# Patient Record
Sex: FEMALE | Race: BLACK OR AFRICAN AMERICAN | NOT HISPANIC OR LATINO | Employment: OTHER | URBAN - METROPOLITAN AREA
[De-identification: names, ages, dates, MRNs, and addresses within clinical notes are randomized per-mention and may not be internally consistent; named-entity substitution may affect disease eponyms.]

---

## 2015-11-30 LAB — CRC RECOMMENDATION EXT: NORMAL

## 2017-01-20 ENCOUNTER — HISTORICAL (OUTPATIENT)
Dept: ADMINISTRATIVE | Facility: HOSPITAL | Age: 67
End: 2017-01-20

## 2017-01-30 ENCOUNTER — HISTORICAL (OUTPATIENT)
Dept: ADMINISTRATIVE | Facility: HOSPITAL | Age: 67
End: 2017-01-30

## 2017-02-02 ENCOUNTER — HISTORICAL (OUTPATIENT)
Dept: SURGERY | Facility: HOSPITAL | Age: 67
End: 2017-02-02

## 2017-02-27 ENCOUNTER — HISTORICAL (OUTPATIENT)
Dept: INTERNAL MEDICINE | Facility: CLINIC | Age: 67
End: 2017-02-27

## 2017-05-22 ENCOUNTER — HISTORICAL (OUTPATIENT)
Dept: WOUND CARE | Facility: HOSPITAL | Age: 67
End: 2017-05-22

## 2018-02-28 ENCOUNTER — HISTORICAL (OUTPATIENT)
Dept: INTERNAL MEDICINE | Facility: CLINIC | Age: 68
End: 2018-02-28

## 2018-02-28 LAB
ABS NEUT (OLG): 5.41 X10(3)/MCL (ref 2.1–9.2)
ALBUMIN SERPL-MCNC: 3.8 GM/DL (ref 3.4–5)
ALBUMIN/GLOB SERPL: 1 RATIO (ref 1–2)
ALP SERPL-CCNC: 79 UNIT/L (ref 45–117)
ALT SERPL-CCNC: 21 UNIT/L (ref 12–78)
AST SERPL-CCNC: 14 UNIT/L (ref 15–37)
BASOPHILS # BLD AUTO: 0.02 X10(3)/MCL
BASOPHILS NFR BLD AUTO: 0 %
BILIRUB SERPL-MCNC: 0.3 MG/DL (ref 0.2–1)
BILIRUBIN DIRECT+TOT PNL SERPL-MCNC: 0.1 MG/DL
BILIRUBIN DIRECT+TOT PNL SERPL-MCNC: 0.2 MG/DL
BUN SERPL-MCNC: 23 MG/DL (ref 7–18)
CALCIUM SERPL-MCNC: 8.4 MG/DL (ref 8.5–10.1)
CHLORIDE SERPL-SCNC: 107 MMOL/L (ref 98–107)
CHOLEST SERPL-MCNC: 127 MG/DL
CHOLEST/HDLC SERPL: 2.9 {RATIO} (ref 0–4.4)
CO2 SERPL-SCNC: 32 MMOL/L (ref 21–32)
COLOR STL: NORMAL
CONSISTENCY STL: NORMAL
CREAT SERPL-MCNC: 1.1 MG/DL (ref 0.6–1.3)
EOSINOPHIL # BLD AUTO: 0.34 X10(3)/MCL
EOSINOPHIL NFR BLD AUTO: 4 %
ERYTHROCYTE [DISTWIDTH] IN BLOOD BY AUTOMATED COUNT: 13.2 % (ref 11.5–14.5)
EST. AVERAGE GLUCOSE BLD GHB EST-MCNC: 157 MG/DL
GLOBULIN SER-MCNC: 3.9 GM/ML (ref 2.3–3.5)
GLUCOSE SERPL-MCNC: 112 MG/DL (ref 74–106)
HBA1C MFR BLD: 7.1 % (ref 4.2–6.3)
HCT VFR BLD AUTO: 39.2 % (ref 35–46)
HDLC SERPL-MCNC: 44 MG/DL
HEMOCCULT SP1 STL QL: NEGATIVE
HEMOCCULT SP2 STL QL: NEGATIVE
HGB BLD-MCNC: 12.5 GM/DL (ref 12–16)
IMM GRANULOCYTES # BLD AUTO: 0.02 10*3/UL
IMM GRANULOCYTES NFR BLD AUTO: 0 %
LDLC SERPL CALC-MCNC: 50 MG/DL (ref 0–130)
LYMPHOCYTES # BLD AUTO: 2.62 X10(3)/MCL
LYMPHOCYTES NFR BLD AUTO: 29 % (ref 13–40)
MCH RBC QN AUTO: 29.6 PG (ref 26–34)
MCHC RBC AUTO-ENTMCNC: 31.9 GM/DL (ref 31–37)
MCV RBC AUTO: 92.9 FL (ref 80–100)
MONOCYTES # BLD AUTO: 0.51 X10(3)/MCL
MONOCYTES NFR BLD AUTO: 6 % (ref 4–12)
NEUTROPHILS # BLD AUTO: 5.41 X10(3)/MCL
NEUTROPHILS NFR BLD AUTO: 61 X10(3)/MCL
PLATELET # BLD AUTO: 271 X10(3)/MCL (ref 130–400)
PMV BLD AUTO: 9.3 FL (ref 7.4–10.4)
POTASSIUM SERPL-SCNC: 3.6 MMOL/L (ref 3.5–5.1)
PROT SERPL-MCNC: 7.7 GM/DL (ref 6.4–8.2)
RBC # BLD AUTO: 4.22 X10(6)/MCL (ref 4–5.2)
SODIUM SERPL-SCNC: 146 MMOL/L (ref 136–145)
T4 FREE SERPL-MCNC: 0.86 NG/DL (ref 0.76–1.46)
TRIGL SERPL-MCNC: 166 MG/DL
TSH SERPL-ACNC: 4.2 MIU/L (ref 0.36–3.74)
VLDLC SERPL CALC-MCNC: 33 MG/DL
WBC # SPEC AUTO: 8.9 X10(3)/MCL (ref 4.5–11)

## 2018-08-08 ENCOUNTER — HISTORICAL (OUTPATIENT)
Dept: RADIOLOGY | Facility: HOSPITAL | Age: 68
End: 2018-08-08

## 2018-08-28 ENCOUNTER — HISTORICAL (OUTPATIENT)
Dept: INTERNAL MEDICINE | Facility: CLINIC | Age: 68
End: 2018-08-28

## 2018-08-28 LAB
ABS NEUT (OLG): 4.37 X10(3)/MCL (ref 2.1–9.2)
ALBUMIN SERPL-MCNC: 4 GM/DL (ref 3.4–5)
ALBUMIN/GLOB SERPL: 1 RATIO (ref 1–2)
ALP SERPL-CCNC: 90 UNIT/L (ref 45–117)
ALT SERPL-CCNC: 19 UNIT/L (ref 12–78)
AST SERPL-CCNC: 14 UNIT/L (ref 15–37)
BASOPHILS # BLD AUTO: 0.03 X10(3)/MCL
BASOPHILS NFR BLD AUTO: 0 %
BILIRUB SERPL-MCNC: 0.4 MG/DL (ref 0.2–1)
BILIRUBIN DIRECT+TOT PNL SERPL-MCNC: 0.1 MG/DL
BILIRUBIN DIRECT+TOT PNL SERPL-MCNC: 0.3 MG/DL
BUN SERPL-MCNC: 25 MG/DL (ref 7–18)
CALCIUM SERPL-MCNC: 8.5 MG/DL (ref 8.5–10.1)
CHLORIDE SERPL-SCNC: 108 MMOL/L (ref 98–107)
CHOLEST SERPL-MCNC: 131 MG/DL
CHOLEST/HDLC SERPL: 2.5 {RATIO} (ref 0–4.4)
CO2 SERPL-SCNC: 33 MMOL/L (ref 21–32)
CREAT SERPL-MCNC: 1.2 MG/DL (ref 0.6–1.3)
EOSINOPHIL # BLD AUTO: 0.32 X10(3)/MCL
EOSINOPHIL NFR BLD AUTO: 4 %
ERYTHROCYTE [DISTWIDTH] IN BLOOD BY AUTOMATED COUNT: 13.5 % (ref 11.5–14.5)
EST. AVERAGE GLUCOSE BLD GHB EST-MCNC: 134 MG/DL
GLOBULIN SER-MCNC: 4.1 GM/ML (ref 2.3–3.5)
GLUCOSE SERPL-MCNC: 86 MG/DL (ref 74–106)
HBA1C MFR BLD: 6.3 % (ref 4.2–6.3)
HCT VFR BLD AUTO: 42.3 % (ref 35–46)
HDLC SERPL-MCNC: 52 MG/DL
HGB BLD-MCNC: 13.3 GM/DL (ref 12–16)
IMM GRANULOCYTES # BLD AUTO: 0.02 10*3/UL
IMM GRANULOCYTES NFR BLD AUTO: 0 %
LDLC SERPL CALC-MCNC: 60 MG/DL (ref 0–130)
LYMPHOCYTES # BLD AUTO: 2.79 X10(3)/MCL
LYMPHOCYTES NFR BLD AUTO: 35 % (ref 13–40)
MCH RBC QN AUTO: 29.6 PG (ref 26–34)
MCHC RBC AUTO-ENTMCNC: 31.4 GM/DL (ref 31–37)
MCV RBC AUTO: 94.2 FL (ref 80–100)
MONOCYTES # BLD AUTO: 0.42 X10(3)/MCL
MONOCYTES NFR BLD AUTO: 5 % (ref 4–12)
NEUTROPHILS # BLD AUTO: 4.37 X10(3)/MCL
NEUTROPHILS NFR BLD AUTO: 55 X10(3)/MCL
PLATELET # BLD AUTO: 285 X10(3)/MCL (ref 130–400)
PMV BLD AUTO: 9.1 FL (ref 7.4–10.4)
POTASSIUM SERPL-SCNC: 4.3 MMOL/L (ref 3.5–5.1)
PROT SERPL-MCNC: 8.1 GM/DL (ref 6.4–8.2)
RBC # BLD AUTO: 4.49 X10(6)/MCL (ref 4–5.2)
SODIUM SERPL-SCNC: 146 MMOL/L (ref 136–145)
TRIGL SERPL-MCNC: 95 MG/DL
TSH SERPL-ACNC: 2.51 MIU/L (ref 0.36–3.74)
VLDLC SERPL CALC-MCNC: 19 MG/DL
WBC # SPEC AUTO: 8 X10(3)/MCL (ref 4.5–11)

## 2018-10-15 ENCOUNTER — HISTORICAL (OUTPATIENT)
Dept: RADIOLOGY | Facility: HOSPITAL | Age: 68
End: 2018-10-15

## 2019-03-01 ENCOUNTER — HISTORICAL (OUTPATIENT)
Dept: INTERNAL MEDICINE | Facility: CLINIC | Age: 69
End: 2019-03-01

## 2019-03-01 LAB
ABS NEUT (OLG): 6.3 X10(3)/MCL (ref 2.1–9.2)
ALBUMIN SERPL-MCNC: 4.3 GM/DL (ref 3.4–5)
ALBUMIN/GLOB SERPL: 1.1 RATIO (ref 1.1–2)
ALP SERPL-CCNC: 121 UNIT/L (ref 45–117)
ALT SERPL-CCNC: 19 UNIT/L (ref 12–78)
AST SERPL-CCNC: 12 UNIT/L (ref 15–37)
BASOPHILS # BLD AUTO: 0.02 X10(3)/MCL
BASOPHILS NFR BLD AUTO: 0 %
BILIRUB SERPL-MCNC: 0.5 MG/DL (ref 0.2–1)
BILIRUBIN DIRECT+TOT PNL SERPL-MCNC: 0.2 MG/DL
BILIRUBIN DIRECT+TOT PNL SERPL-MCNC: 0.3 MG/DL
BUN SERPL-MCNC: 22 MG/DL (ref 7–18)
CALCIUM SERPL-MCNC: 8.7 MG/DL (ref 8.5–10.1)
CHLORIDE SERPL-SCNC: 107 MMOL/L (ref 98–107)
CHOLEST SERPL-MCNC: 128 MG/DL
CHOLEST/HDLC SERPL: 2.5 {RATIO} (ref 0–4.4)
CO2 SERPL-SCNC: 31 MMOL/L (ref 21–32)
CREAT SERPL-MCNC: 1.2 MG/DL (ref 0.6–1.3)
EOSINOPHIL # BLD AUTO: 0.2 X10(3)/MCL
EOSINOPHIL NFR BLD AUTO: 2 %
ERYTHROCYTE [DISTWIDTH] IN BLOOD BY AUTOMATED COUNT: 14.6 % (ref 11.5–14.5)
EST. AVERAGE GLUCOSE BLD GHB EST-MCNC: 235 MG/DL
GLOBULIN SER-MCNC: 4 GM/ML (ref 2.3–3.5)
GLUCOSE SERPL-MCNC: 190 MG/DL (ref 74–106)
HBA1C MFR BLD: 9.8 % (ref 4.2–6.3)
HCT VFR BLD AUTO: 42.4 % (ref 35–46)
HDLC SERPL-MCNC: 52 MG/DL
HGB BLD-MCNC: 13.5 GM/DL (ref 12–16)
IMM GRANULOCYTES # BLD AUTO: 0.03 10*3/UL
IMM GRANULOCYTES NFR BLD AUTO: 0 %
LDLC SERPL CALC-MCNC: 46 MG/DL (ref 0–130)
LYMPHOCYTES # BLD AUTO: 2.63 X10(3)/MCL
LYMPHOCYTES NFR BLD AUTO: 27 % (ref 13–40)
MCH RBC QN AUTO: 28.7 PG (ref 26–34)
MCHC RBC AUTO-ENTMCNC: 31.8 GM/DL (ref 31–37)
MCV RBC AUTO: 90.2 FL (ref 80–100)
MONOCYTES # BLD AUTO: 0.48 X10(3)/MCL
MONOCYTES NFR BLD AUTO: 5 % (ref 4–12)
NEUTROPHILS # BLD AUTO: 6.3 X10(3)/MCL
NEUTROPHILS NFR BLD AUTO: 65 X10(3)/MCL
PLATELET # BLD AUTO: 304 X10(3)/MCL (ref 130–400)
PMV BLD AUTO: 9.5 FL (ref 7.4–10.4)
POTASSIUM SERPL-SCNC: 3.8 MMOL/L (ref 3.5–5.1)
PROT SERPL-MCNC: 8.3 GM/DL (ref 6.4–8.2)
RBC # BLD AUTO: 4.7 X10(6)/MCL (ref 4–5.2)
SODIUM SERPL-SCNC: 143 MMOL/L (ref 136–145)
TRIGL SERPL-MCNC: 151 MG/DL
TSH SERPL-ACNC: 2.97 MIU/L (ref 0.36–3.74)
VLDLC SERPL CALC-MCNC: 30 MG/DL
WBC # SPEC AUTO: 9.7 X10(3)/MCL (ref 4.5–11)

## 2019-04-04 ENCOUNTER — HISTORICAL (OUTPATIENT)
Dept: RADIOLOGY | Facility: HOSPITAL | Age: 69
End: 2019-04-04

## 2019-08-30 LAB
BILIRUB SERPL-MCNC: NEGATIVE MG/DL
BLOOD URINE, POC: NEGATIVE
CLARITY, POC UA: NORMAL
COLOR, POC UA: COLORLESS
GLUCOSE UR QL STRIP: NEGATIVE
KETONES UR QL STRIP: NEGATIVE
LEUKOCYTE EST, POC UA: NEGATIVE
NITRITE, POC UA: NEGATIVE
PH, POC UA: 5
PROTEIN, POC: NEGATIVE
SPECIFIC GRAVITY, POC UA: 1.02
UROBILINOGEN, POC UA: NORMAL

## 2019-10-16 ENCOUNTER — HISTORICAL (OUTPATIENT)
Dept: RADIOLOGY | Facility: HOSPITAL | Age: 69
End: 2019-10-16

## 2019-10-21 ENCOUNTER — HISTORICAL (OUTPATIENT)
Dept: ADMINISTRATIVE | Facility: HOSPITAL | Age: 69
End: 2019-10-21

## 2020-06-24 ENCOUNTER — HISTORICAL (OUTPATIENT)
Dept: ADMINISTRATIVE | Facility: HOSPITAL | Age: 70
End: 2020-06-24

## 2020-06-24 LAB
ABS NEUT (OLG): 5.34 X10(3)/MCL (ref 2.1–9.2)
ALBUMIN SERPL-MCNC: 3.4 GM/DL (ref 3.4–5)
ALBUMIN/GLOB SERPL: 0.8 RATIO (ref 1.1–2)
ALP SERPL-CCNC: 86 UNIT/L (ref 45–117)
ALT SERPL-CCNC: 20 UNIT/L (ref 12–78)
AST SERPL-CCNC: 10 UNIT/L (ref 15–37)
BASOPHILS # BLD AUTO: 0 X10(3)/MCL (ref 0–0.2)
BASOPHILS NFR BLD AUTO: 0 %
BILIRUB SERPL-MCNC: 0.4 MG/DL (ref 0.2–1)
BILIRUBIN DIRECT+TOT PNL SERPL-MCNC: 0.1 MG/DL (ref 0–0.2)
BILIRUBIN DIRECT+TOT PNL SERPL-MCNC: 0.3 MG/DL
BUN SERPL-MCNC: 16 MG/DL (ref 7–18)
CALCIUM SERPL-MCNC: 8.5 MG/DL (ref 8.5–10.1)
CHLORIDE SERPL-SCNC: 106 MMOL/L (ref 98–107)
CHOLEST SERPL-MCNC: 154 MG/DL
CHOLEST/HDLC SERPL: 3.5 {RATIO} (ref 0–4.4)
CO2 SERPL-SCNC: 28 MMOL/L (ref 21–32)
CREAT SERPL-MCNC: 1 MG/DL (ref 0.6–1.3)
EOSINOPHIL # BLD AUTO: 0.2 X10(3)/MCL (ref 0–0.9)
EOSINOPHIL NFR BLD AUTO: 2 %
ERYTHROCYTE [DISTWIDTH] IN BLOOD BY AUTOMATED COUNT: 13.4 % (ref 11.5–14.5)
EST. AVERAGE GLUCOSE BLD GHB EST-MCNC: 252 MG/DL
GLOBULIN SER-MCNC: 4.1 GM/ML (ref 2.3–3.5)
GLUCOSE SERPL-MCNC: 244 MG/DL (ref 74–106)
HBA1C MFR BLD: 10.4 % (ref 4.2–6.3)
HCT VFR BLD AUTO: 39.8 % (ref 35–46)
HDLC SERPL-MCNC: 44 MG/DL (ref 40–59)
HGB BLD-MCNC: 12.5 GM/DL (ref 12–16)
IMM GRANULOCYTES # BLD AUTO: 0.05 10*3/UL
IMM GRANULOCYTES NFR BLD AUTO: 1 %
LDLC SERPL CALC-MCNC: 87 MG/DL
LYMPHOCYTES # BLD AUTO: 2 X10(3)/MCL (ref 0.6–4.6)
LYMPHOCYTES NFR BLD AUTO: 25 %
MCH RBC QN AUTO: 27.8 PG (ref 26–34)
MCHC RBC AUTO-ENTMCNC: 31.4 GM/DL (ref 31–37)
MCV RBC AUTO: 88.6 FL (ref 80–100)
MONOCYTES # BLD AUTO: 0.5 X10(3)/MCL (ref 0.1–1.3)
MONOCYTES NFR BLD AUTO: 6 %
NEUTROPHILS # BLD AUTO: 5.34 X10(3)/MCL (ref 2.1–9.2)
NEUTROPHILS NFR BLD AUTO: 66 %
PLATELET # BLD AUTO: 260 X10(3)/MCL (ref 130–400)
PMV BLD AUTO: 9.7 FL (ref 7.4–10.4)
POTASSIUM SERPL-SCNC: 3.6 MMOL/L (ref 3.5–5.1)
PROT SERPL-MCNC: 7.5 GM/DL (ref 6.4–8.2)
RBC # BLD AUTO: 4.49 X10(6)/MCL (ref 4–5.2)
SODIUM SERPL-SCNC: 140 MMOL/L (ref 136–145)
T4 FREE SERPL-MCNC: 1.03 NG/DL (ref 0.76–1.46)
TRIGL SERPL-MCNC: 116 MG/DL
TSH SERPL-ACNC: 2.28 MIU/L (ref 0.36–3.74)
VLDLC SERPL CALC-MCNC: 23 MG/DL
WBC # SPEC AUTO: 8.1 X10(3)/MCL (ref 4.5–11)

## 2020-09-17 ENCOUNTER — HISTORICAL (OUTPATIENT)
Dept: LAB | Facility: HOSPITAL | Age: 70
End: 2020-09-17

## 2020-09-17 LAB
ALBUMIN SERPL-MCNC: 3.8 GM/DL (ref 3.4–5)
ALBUMIN/GLOB SERPL: 1 RATIO (ref 1.1–2)
ALP SERPL-CCNC: 75 UNIT/L (ref 45–117)
ALT SERPL-CCNC: 17 UNIT/L (ref 12–78)
AST SERPL-CCNC: 11 UNIT/L (ref 15–37)
BILIRUB SERPL-MCNC: 0.4 MG/DL (ref 0.2–1)
BILIRUBIN DIRECT+TOT PNL SERPL-MCNC: 0.1 MG/DL (ref 0–0.2)
BILIRUBIN DIRECT+TOT PNL SERPL-MCNC: 0.3 MG/DL
BUN SERPL-MCNC: 18 MG/DL (ref 7–18)
CALCIUM SERPL-MCNC: 8.6 MG/DL (ref 8.5–10.1)
CHLORIDE SERPL-SCNC: 105 MMOL/L (ref 98–107)
CO2 SERPL-SCNC: 29 MMOL/L (ref 21–32)
CREAT SERPL-MCNC: 1 MG/DL (ref 0.6–1.3)
CREAT UR-MCNC: 164 MG/DL
DEPRECATED CALCIDIOL+CALCIFEROL SERPL-MC: 28.3 NG/ML (ref 30–80)
ERYTHROCYTE [DISTWIDTH] IN BLOOD BY AUTOMATED COUNT: 13.5 % (ref 11.5–14.5)
EST. AVERAGE GLUCOSE BLD GHB EST-MCNC: 246 MG/DL
GLOBULIN SER-MCNC: 3.7 GM/ML (ref 2.3–3.5)
GLUCOSE SERPL-MCNC: 205 MG/DL (ref 74–106)
HBA1C MFR BLD: 10.2 % (ref 4.2–6.3)
HCT VFR BLD AUTO: 38.4 % (ref 35–46)
HGB BLD-MCNC: 12.1 GM/DL (ref 12–16)
MCH RBC QN AUTO: 27.8 PG (ref 26–34)
MCHC RBC AUTO-ENTMCNC: 31.5 GM/DL (ref 31–37)
MCV RBC AUTO: 88.3 FL (ref 80–100)
MICROALBUMIN UR-MCNC: 6.7 MG/L (ref 0–19)
MICROALBUMIN/CREAT RATIO PNL UR: 4.1 MCG/MG CR (ref 0–29)
PLATELET # BLD AUTO: 261 X10(3)/MCL (ref 130–400)
PMV BLD AUTO: 9.8 FL (ref 7.4–10.4)
POTASSIUM SERPL-SCNC: 3.7 MMOL/L (ref 3.5–5.1)
PROT SERPL-MCNC: 7.5 GM/DL (ref 6.4–8.2)
RBC # BLD AUTO: 4.35 X10(6)/MCL (ref 4–5.2)
SODIUM SERPL-SCNC: 141 MMOL/L (ref 136–145)
T4 FREE SERPL-MCNC: 0.97 NG/DL (ref 0.76–1.46)
TSH SERPL-ACNC: 4.61 MIU/L (ref 0.36–3.74)
WBC # SPEC AUTO: 8.4 X10(3)/MCL (ref 4.5–11)

## 2020-11-17 ENCOUNTER — HISTORICAL (OUTPATIENT)
Dept: RADIOLOGY | Facility: HOSPITAL | Age: 70
End: 2020-11-17

## 2020-12-17 ENCOUNTER — HISTORICAL (OUTPATIENT)
Dept: RADIOLOGY | Facility: HOSPITAL | Age: 70
End: 2020-12-17

## 2020-12-21 ENCOUNTER — HISTORICAL (OUTPATIENT)
Dept: ADMINISTRATIVE | Facility: HOSPITAL | Age: 70
End: 2020-12-21

## 2020-12-21 LAB
BUN SERPL-MCNC: 25 MG/DL (ref 9.8–20.1)
CALCIUM SERPL-MCNC: 8.6 MG/DL (ref 8.4–10.2)
CHLORIDE SERPL-SCNC: 106 MMOL/L (ref 98–107)
CO2 SERPL-SCNC: 27 MMOL/L (ref 23–31)
CREAT SERPL-MCNC: 1.12 MG/DL (ref 0.55–1.02)
CREAT UR-MCNC: 98.7 MG/DL (ref 45–106)
CREAT/UREA NIT SERPL: 22
GLUCOSE SERPL-MCNC: 54 MG/DL (ref 82–115)
MICROALBUMIN UR-MCNC: 6.7 UG/ML
MICROALBUMIN/CREAT RATIO PNL UR: 6.8 MG/GM CR (ref 0–30)
POTASSIUM SERPL-SCNC: 3.9 MMOL/L (ref 3.5–5.1)
SODIUM SERPL-SCNC: 144 MMOL/L (ref 136–145)
T4 FREE SERPL-MCNC: 0.78 NG/DL (ref 0.7–1.48)
TSH SERPL-ACNC: 4.45 UIU/ML (ref 0.35–4.94)

## 2021-04-26 ENCOUNTER — HISTORICAL (OUTPATIENT)
Dept: ENDOCRINOLOGY | Facility: CLINIC | Age: 71
End: 2021-04-26

## 2021-04-26 LAB
ALBUMIN SERPL-MCNC: 3.7 GM/DL (ref 3.4–4.8)
ALBUMIN/GLOB SERPL: 1.2 RATIO (ref 1.1–2)
ALP SERPL-CCNC: 68 UNIT/L (ref 40–150)
ALT SERPL-CCNC: 14 UNIT/L (ref 0–55)
AST SERPL-CCNC: 19 UNIT/L (ref 5–34)
BILIRUB SERPL-MCNC: 0.5 MG/DL
BILIRUBIN DIRECT+TOT PNL SERPL-MCNC: 0.2 MG/DL (ref 0–0.5)
BILIRUBIN DIRECT+TOT PNL SERPL-MCNC: 0.3 MG/DL (ref 0–0.8)
BUN SERPL-MCNC: 29.4 MG/DL (ref 9.8–20.1)
CALCIUM SERPL-MCNC: 8.9 MG/DL (ref 8.4–10.2)
CHLORIDE SERPL-SCNC: 109 MMOL/L (ref 98–107)
CHOLEST SERPL-MCNC: 111 MG/DL
CHOLEST/HDLC SERPL: 3 {RATIO} (ref 0–5)
CO2 SERPL-SCNC: 25 MMOL/L (ref 23–31)
CREAT SERPL-MCNC: 1.3 MG/DL (ref 0.55–1.02)
CREAT UR-MCNC: 79.9 MG/DL (ref 45–106)
DEPRECATED CALCIDIOL+CALCIFEROL SERPL-MC: 28.4 NG/ML (ref 30–80)
EST. AVERAGE GLUCOSE BLD GHB EST-MCNC: 145.6 MG/DL
GLOBULIN SER-MCNC: 3.2 GM/DL (ref 2.4–3.5)
GLUCOSE SERPL-MCNC: 110 MG/DL (ref 82–115)
HBA1C MFR BLD: 6.7 %
HDLC SERPL-MCNC: 38 MG/DL (ref 35–60)
LDLC SERPL CALC-MCNC: 50 MG/DL (ref 50–140)
MICROALBUMIN UR-MCNC: 7.1 MG/L
MICROALBUMIN/CREAT RATIO PNL UR: 8.9 MG/GM CR (ref 0–30)
POTASSIUM SERPL-SCNC: 3.9 MMOL/L (ref 3.5–5.1)
PROT SERPL-MCNC: 6.9 GM/DL (ref 5.8–7.6)
SODIUM SERPL-SCNC: 144 MMOL/L (ref 136–145)
T4 FREE SERPL-MCNC: 0.86 NG/DL (ref 0.7–1.48)
TRIGL SERPL-MCNC: 115 MG/DL (ref 37–140)
TSH SERPL-ACNC: 5.72 UIU/ML (ref 0.35–4.94)
VLDLC SERPL CALC-MCNC: 23 MG/DL

## 2021-06-04 ENCOUNTER — HISTORICAL (OUTPATIENT)
Dept: INTERNAL MEDICINE | Facility: CLINIC | Age: 71
End: 2021-06-04

## 2021-06-04 LAB
ABS NEUT (OLG): 4.47 X10(3)/MCL (ref 2.1–9.2)
ALBUMIN SERPL-MCNC: 3.7 GM/DL (ref 3.4–4.8)
ALBUMIN/GLOB SERPL: 1.2 RATIO (ref 1.1–2)
ALP SERPL-CCNC: 64 UNIT/L (ref 40–150)
ALT SERPL-CCNC: 18 UNIT/L (ref 0–55)
AST SERPL-CCNC: 18 UNIT/L (ref 5–34)
BASOPHILS # BLD AUTO: 0 X10(3)/MCL (ref 0–0.2)
BASOPHILS NFR BLD AUTO: 0 %
BILIRUB SERPL-MCNC: 0.3 MG/DL
BILIRUBIN DIRECT+TOT PNL SERPL-MCNC: 0.1 MG/DL (ref 0–0.8)
BILIRUBIN DIRECT+TOT PNL SERPL-MCNC: 0.2 MG/DL (ref 0–0.5)
BUN SERPL-MCNC: 25.3 MG/DL (ref 9.8–20.1)
CALCIUM SERPL-MCNC: 9 MG/DL (ref 8.4–10.2)
CHLORIDE SERPL-SCNC: 110 MMOL/L (ref 98–107)
CHOLEST SERPL-MCNC: 124 MG/DL
CHOLEST/HDLC SERPL: 3 {RATIO} (ref 0–5)
CO2 SERPL-SCNC: 28 MMOL/L (ref 23–31)
CREAT SERPL-MCNC: 1.34 MG/DL (ref 0.55–1.02)
EOSINOPHIL # BLD AUTO: 0.2 X10(3)/MCL (ref 0–0.9)
EOSINOPHIL NFR BLD AUTO: 3 %
ERYTHROCYTE [DISTWIDTH] IN BLOOD BY AUTOMATED COUNT: 15.8 % (ref 11.5–14.5)
EST. AVERAGE GLUCOSE BLD GHB EST-MCNC: 142.7 MG/DL
GLOBULIN SER-MCNC: 3.1 GM/DL (ref 2.4–3.5)
GLUCOSE SERPL-MCNC: 90 MG/DL (ref 82–115)
HBA1C MFR BLD: 6.6 %
HCT VFR BLD AUTO: 36 % (ref 35–46)
HDLC SERPL-MCNC: 42 MG/DL (ref 35–60)
HGB BLD-MCNC: 11.2 GM/DL (ref 12–16)
IMM GRANULOCYTES # BLD AUTO: 0.03 10*3/UL
IMM GRANULOCYTES NFR BLD AUTO: 0 %
LDLC SERPL CALC-MCNC: 67 MG/DL (ref 50–140)
LYMPHOCYTES # BLD AUTO: 2.5 X10(3)/MCL (ref 0.6–4.6)
LYMPHOCYTES NFR BLD AUTO: 32 %
MCH RBC QN AUTO: 28.4 PG (ref 26–34)
MCHC RBC AUTO-ENTMCNC: 31.1 GM/DL (ref 31–37)
MCV RBC AUTO: 91.1 FL (ref 80–100)
MONOCYTES # BLD AUTO: 0.6 X10(3)/MCL (ref 0.1–1.3)
MONOCYTES NFR BLD AUTO: 7 %
NEUTROPHILS # BLD AUTO: 4.47 X10(3)/MCL (ref 2.1–9.2)
NEUTROPHILS NFR BLD AUTO: 57 %
NRBC BLD AUTO-RTO: 0 % (ref 0–0.2)
PLATELET # BLD AUTO: 247 X10(3)/MCL (ref 130–400)
PMV BLD AUTO: 9.9 FL (ref 7.4–10.4)
POTASSIUM SERPL-SCNC: 4.4 MMOL/L (ref 3.5–5.1)
PROT SERPL-MCNC: 6.8 GM/DL (ref 5.8–7.6)
RBC # BLD AUTO: 3.95 X10(6)/MCL (ref 4–5.2)
SODIUM SERPL-SCNC: 147 MMOL/L (ref 136–145)
T4 FREE SERPL-MCNC: 0.79 NG/DL (ref 0.7–1.48)
TRIGL SERPL-MCNC: 75 MG/DL (ref 37–140)
TSH SERPL-ACNC: 5.11 UIU/ML (ref 0.35–4.94)
VLDLC SERPL CALC-MCNC: 15 MG/DL
WBC # SPEC AUTO: 7.8 X10(3)/MCL (ref 4.5–11)

## 2021-09-08 ENCOUNTER — HISTORICAL (OUTPATIENT)
Dept: NEPHROLOGY | Facility: CLINIC | Age: 71
End: 2021-09-08

## 2021-09-08 LAB
ABS NEUT (OLG): 4.51 X10(3)/MCL (ref 2.1–9.2)
ALBUMIN SERPL-MCNC: 3.6 GM/DL (ref 3.4–4.8)
ALBUMIN/GLOB SERPL: 1 RATIO (ref 1.1–2)
ALP SERPL-CCNC: 79 UNIT/L (ref 40–150)
ALT SERPL-CCNC: 15 UNIT/L (ref 0–55)
APPEARANCE, UA: CLEAR
AST SERPL-CCNC: 14 UNIT/L (ref 5–34)
BACTERIA #/AREA URNS AUTO: ABNORMAL /HPF
BASOPHILS # BLD AUTO: 0 X10(3)/MCL (ref 0–0.2)
BASOPHILS NFR BLD AUTO: 0 %
BILIRUB SERPL-MCNC: 0.4 MG/DL
BILIRUB UR QL STRIP: NEGATIVE
BILIRUBIN DIRECT+TOT PNL SERPL-MCNC: 0.2 MG/DL (ref 0–0.5)
BILIRUBIN DIRECT+TOT PNL SERPL-MCNC: 0.2 MG/DL (ref 0–0.8)
BUN SERPL-MCNC: 22.7 MG/DL (ref 9.8–20.1)
CALCIUM SERPL-MCNC: 9.1 MG/DL (ref 8.4–10.2)
CHLORIDE SERPL-SCNC: 106 MMOL/L (ref 98–107)
CO2 SERPL-SCNC: 31 MMOL/L (ref 23–31)
COLOR UR: COLORLESS
CREAT SERPL-MCNC: 1.11 MG/DL (ref 0.55–1.02)
CREAT UR-MCNC: 51 MG/DL (ref 45–106)
DEPRECATED CALCIDIOL+CALCIFEROL SERPL-MC: 29.7 NG/ML (ref 30–80)
EOSINOPHIL # BLD AUTO: 0.2 X10(3)/MCL (ref 0–0.9)
EOSINOPHIL NFR BLD AUTO: 2 %
ERYTHROCYTE [DISTWIDTH] IN BLOOD BY AUTOMATED COUNT: 14.6 % (ref 11.5–14.5)
GLOBULIN SER-MCNC: 3.5 GM/DL (ref 2.4–3.5)
GLUCOSE (UA): NEGATIVE
GLUCOSE SERPL-MCNC: 82 MG/DL (ref 82–115)
HCT VFR BLD AUTO: 38.3 % (ref 35–46)
HGB BLD-MCNC: 11.9 GM/DL (ref 12–16)
HGB UR QL STRIP: NEGATIVE
HYALINE CASTS #/AREA URNS LPF: ABNORMAL /LPF
IMM GRANULOCYTES # BLD AUTO: 0.02 10*3/UL
IMM GRANULOCYTES NFR BLD AUTO: 0 %
KETONES UR QL STRIP: NEGATIVE
LEUKOCYTE ESTERASE UR QL STRIP: 25 LEU/UL
LYMPHOCYTES # BLD AUTO: 2.8 X10(3)/MCL (ref 0.6–4.6)
LYMPHOCYTES NFR BLD AUTO: 35 %
MAGNESIUM SERPL-MCNC: 1.6 MG/DL (ref 1.6–2.6)
MCH RBC QN AUTO: 28.7 PG (ref 26–34)
MCHC RBC AUTO-ENTMCNC: 31.1 GM/DL (ref 31–37)
MCV RBC AUTO: 92.5 FL (ref 80–100)
MONOCYTES # BLD AUTO: 0.5 X10(3)/MCL (ref 0.1–1.3)
MONOCYTES NFR BLD AUTO: 6 %
NEUTROPHILS # BLD AUTO: 4.51 X10(3)/MCL (ref 2.1–9.2)
NEUTROPHILS NFR BLD AUTO: 56 %
NITRITE UR QL STRIP: NEGATIVE
NRBC BLD AUTO-RTO: 0 % (ref 0–0.2)
PH UR STRIP: 6 [PH] (ref 4.5–8)
PHOSPHATE SERPL-MCNC: 4.1 MG/DL (ref 2.3–4.7)
PLATELET # BLD AUTO: 256 X10(3)/MCL (ref 130–400)
PMV BLD AUTO: 9.3 FL (ref 7.4–10.4)
POTASSIUM SERPL-SCNC: 3.8 MMOL/L (ref 3.5–5.1)
PROT SERPL-MCNC: 7.1 GM/DL (ref 5.8–7.6)
PROT UR QL STRIP: NEGATIVE
PROT UR STRIP-MCNC: <6.8 MG/DL
PROT/CREAT UR-RTO: <133.3 MG/GM CR
PTH-INTACT SERPL-MCNC: 67.6 PG/ML (ref 8.7–77)
RBC # BLD AUTO: 4.14 X10(6)/MCL (ref 4–5.2)
RBC #/AREA URNS AUTO: ABNORMAL /HPF
SODIUM SERPL-SCNC: 143 MMOL/L (ref 136–145)
SP GR UR STRIP: 1 (ref 1–1.03)
SQUAMOUS #/AREA URNS LPF: ABNORMAL /LPF
UROBILINOGEN UR STRIP-ACNC: NORMAL
WBC # SPEC AUTO: 8.1 X10(3)/MCL (ref 4.5–11)
WBC #/AREA URNS AUTO: ABNORMAL /HPF

## 2021-11-22 ENCOUNTER — HISTORICAL (OUTPATIENT)
Dept: RADIOLOGY | Facility: HOSPITAL | Age: 71
End: 2021-11-22

## 2021-11-22 LAB
ABS NEUT (OLG): 5.6 X10(3)/MCL (ref 2.1–9.2)
ALBUMIN SERPL-MCNC: 3.5 GM/DL (ref 3.4–4.8)
ALBUMIN/GLOB SERPL: 1.1 RATIO (ref 1.1–2)
ALP SERPL-CCNC: 86 UNIT/L (ref 40–150)
ALT SERPL-CCNC: 14 UNIT/L (ref 0–55)
AST SERPL-CCNC: 10 UNIT/L (ref 5–34)
BASOPHILS # BLD AUTO: 0 X10(3)/MCL (ref 0–0.2)
BASOPHILS NFR BLD AUTO: 0 %
BILIRUB SERPL-MCNC: 0.3 MG/DL
BILIRUBIN DIRECT+TOT PNL SERPL-MCNC: 0.1 MG/DL (ref 0–0.8)
BILIRUBIN DIRECT+TOT PNL SERPL-MCNC: 0.2 MG/DL (ref 0–0.5)
BUN SERPL-MCNC: 18.9 MG/DL (ref 9.8–20.1)
CALCIUM SERPL-MCNC: 8.9 MG/DL (ref 8.7–10.5)
CHLORIDE SERPL-SCNC: 106 MMOL/L (ref 98–107)
CHOLEST SERPL-MCNC: 115 MG/DL
CHOLEST/HDLC SERPL: 2 {RATIO} (ref 0–5)
CO2 SERPL-SCNC: 31 MMOL/L (ref 23–31)
CREAT SERPL-MCNC: 0.89 MG/DL (ref 0.55–1.02)
EOSINOPHIL # BLD AUTO: 0.2 X10(3)/MCL (ref 0–0.9)
EOSINOPHIL NFR BLD AUTO: 2 %
ERYTHROCYTE [DISTWIDTH] IN BLOOD BY AUTOMATED COUNT: 15 % (ref 11.5–14.5)
EST. AVERAGE GLUCOSE BLD GHB EST-MCNC: 142.7 MG/DL
GLOBULIN SER-MCNC: 3.3 GM/DL (ref 2.4–3.5)
GLUCOSE SERPL-MCNC: 124 MG/DL (ref 82–115)
HBA1C MFR BLD: 6.6 %
HCT VFR BLD AUTO: 36.6 % (ref 35–46)
HDLC SERPL-MCNC: 51 MG/DL (ref 35–60)
HGB BLD-MCNC: 11.2 GM/DL (ref 12–16)
IMM GRANULOCYTES # BLD AUTO: 0.08 10*3/UL
IMM GRANULOCYTES NFR BLD AUTO: 1 %
LDLC SERPL CALC-MCNC: 53 MG/DL (ref 50–140)
LYMPHOCYTES # BLD AUTO: 3 X10(3)/MCL (ref 0.6–4.6)
LYMPHOCYTES NFR BLD AUTO: 32 %
MCH RBC QN AUTO: 27.7 PG (ref 26–34)
MCHC RBC AUTO-ENTMCNC: 30.6 GM/DL (ref 31–37)
MCV RBC AUTO: 90.6 FL (ref 80–100)
MONOCYTES # BLD AUTO: 0.7 X10(3)/MCL (ref 0.1–1.3)
MONOCYTES NFR BLD AUTO: 7 %
NEUTROPHILS # BLD AUTO: 5.6 X10(3)/MCL (ref 2.1–9.2)
NEUTROPHILS NFR BLD AUTO: 58 %
NRBC BLD AUTO-RTO: 0.2 % (ref 0–0.2)
PLATELET # BLD AUTO: 292 X10(3)/MCL (ref 130–400)
PMV BLD AUTO: 9.2 FL (ref 7.4–10.4)
POTASSIUM SERPL-SCNC: 3.8 MMOL/L (ref 3.5–5.1)
PROT SERPL-MCNC: 6.8 GM/DL (ref 5.8–7.6)
RBC # BLD AUTO: 4.04 X10(6)/MCL (ref 4–5.2)
SODIUM SERPL-SCNC: 143 MMOL/L (ref 136–145)
T4 FREE SERPL-MCNC: 0.99 NG/DL (ref 0.7–1.48)
TRIGL SERPL-MCNC: 54 MG/DL (ref 37–140)
TSH SERPL-ACNC: 4.79 UIU/ML (ref 0.35–4.94)
VLDLC SERPL CALC-MCNC: 11 MG/DL
WBC # SPEC AUTO: 9.6 X10(3)/MCL (ref 4.5–11)

## 2021-12-06 ENCOUNTER — HISTORICAL (OUTPATIENT)
Dept: INTERNAL MEDICINE | Facility: CLINIC | Age: 71
End: 2021-12-06

## 2021-12-06 LAB
ANTINUCLEAR ANTIBODY SCREEN (OHS): NEGATIVE
CREAT UR-MCNC: 35.4 MG/DL (ref 45–106)
DSDNA ANTIBODY (OHS): NEGATIVE
PROT UR STRIP-MCNC: <6.8 MG/DL
PROT/CREAT UR-RTO: <192.1 MG/GM CR
SERINE PROT 3-ARUP: 0 AU/ML

## 2022-03-22 ENCOUNTER — HISTORICAL (OUTPATIENT)
Dept: ADMINISTRATIVE | Facility: HOSPITAL | Age: 72
End: 2022-03-22

## 2022-04-10 ENCOUNTER — HISTORICAL (OUTPATIENT)
Dept: ADMINISTRATIVE | Facility: HOSPITAL | Age: 72
End: 2022-04-10
Payer: MEDICARE

## 2022-04-27 VITALS
DIASTOLIC BLOOD PRESSURE: 78 MMHG | WEIGHT: 198.88 LBS | OXYGEN SATURATION: 97 % | BODY MASS INDEX: 33.95 KG/M2 | SYSTOLIC BLOOD PRESSURE: 116 MMHG | HEIGHT: 64 IN

## 2022-05-04 NOTE — HISTORICAL [facility]ERNER
This is a historical note converted from Ozzy. Formatting and pictures may have been removed.  Please reference Ozzy for original formatting and attached multimedia. Chief Complaint  check up  History of Present Illness  68 yo bwith htn, chol, dm, jt pan  Review of Systems  neg cv, neg pul, neg  Physical Exam  Vitals & Measurements  T:?36.7? ?C (Oral)? HR:?78(Peripheral)? RR:?18? BP:?148/89?  HT:?163?cm? WT:?81.2?kg? BMI:?30.56?  aax4 nad  zabrina eomi  cv rrr s1s2 no mgr  lungs cta no cr or whz  abd nt soft  ext no edema  neuro intact  Assessment/Plan  1.?Back pain?M54.9  ?x ray  Ordered:  Influenza Virus Vaccine, Inactivated, 0.5 mL, form: Soln, IM, Once-Unscheduled, first dose 10/21/19 7:49:00 CDT  CBC w/ Auto Diff, Routine collect, *Est. 10/21/19 3:00:00 CDT, Blood, Order for future visit, *Est. Stop date 10/21/19 3:00:00 CDT, Lab Collect, Back pain, 10/21/19 7:49:00 CDT  Clinic Follow up, *Est. 04/21/20 3:00:00 CDT, Order for future visit, Back pain, [facility]  Comprehensive Metabolic Panel, Routine collect, *Est. 10/21/19 3:00:00 CDT, Blood, Order for future visit, *Est. Stop date 10/21/19 3:00:00 CDT, Lab Collect, Back pain, 10/21/19 7:49:00 CDT  Hemoglobin A1C [facility], Routine collect, *Est. 10/21/19 3:00:00 CDT, Blood, Order for future visit, *Est. Stop date 10/21/19 3:00:00 CDT, Lab Collect, Back pain, 10/21/19 7:49:00 CDT  Lipid Panel, Routine collect, *Est. 10/21/19 3:00:00 CDT, Blood, Order for future visit, *Est. Stop date 10/21/19 3:00:00 CDT, Lab Collect, Back pain, 10/21/19 7:49:00 CDT  Office/Outpatient Visit Level 4 Established [id number] PC, Back pain, C Int Med C, 10/21/19 7:49:00 CDT  Thyroid Stimulating Hormone, Routine collect, *Est. 10/21/19 3:00:00 CDT, Blood, Order for future visit, *Est. Stop date 10/21/19 3:00:00 CDT, Lab Collect, Back pain, 10/21/19 7:49:00 CDT  XR Spine Lumbar 2 or 3 Views, Routine, *Est. 10/21/19 3:00:00 CDT, Back Pain, None, Ambulatory, Rad Type, Order for future  visit, Back pain, Not Scheduled, *Est. 10/21/19 3:00:00 CDT  ?  2.?Diabetes?E11.9  ?a1c  ?  3.?GERD (gastroesophageal reflux disease)?K21.9  ?stable  ?  4.?HTN (hypertension)?I10  ?controlled  ?  Referrals  Clinic Follow up, *Est. 04/21/20 3:00:00 CDT, Order for future visit, Back pain, [facility]   Problem List/Past Medical History  Ongoing  Age-related nuclear cataract, left eye  Anxiety  Back pain  Cataract  Depression  Diabetes  Diabetes  Diabetes  GERD (gastroesophageal reflux disease)  HTN (hypertension)  HTN (hypertension)  Hypercholesteremia  Insomnia  Lichen sclerosus  Neuropathy  Sinusitis  Historical  Chronic back pain  Diarrhea  Hyperlipidemia  Pregnant  Pregnant  Pregnant  Pregnant  Pregnant  Pregnant  Procedure/Surgical History  knee surgery (10/10/2018)  Cataract Extraction Phacoemulsification (Left) (02/02/2017)  Extracapsular cataract removal with insertion of intraocular lens prosthesis (1 stage procedure), manual or mechanical technique (eg, irrigation and aspiration or phacoemulsification) (02/02/2017)  Lens, intraocular (telescopic) (02/02/2017)  Replacement of Left Lens with Synthetic Substitute, Percutaneous Approach (02/02/2017)  Cataract Extraction Phacoemulsification (Right) (09/22/2016)  Extracapsular cataract removal with insertion of intraocular lens prosthesis (1 stage procedure), manual or mechanical technique (eg, irrigation and aspiration or phacoemulsification) (09/22/2016)  Replacement of Right Lens with Synthetic Substitute, Percutaneous Approach (09/22/2016)  Colonoscopy, flexible; with biopsy, single or multiple (11/30/2015)  Excision of Large Intestine, Via Natural or Artificial Opening Endoscopic, Diagnostic (11/30/2015)  Total hysterectomy (1992)  Cholecystectomy  Dilation and curettage  Disc surgery in neck  Fusion of joint of cervical spine with internal fixation by anterior approach  Hysterectomy  Insertion of stent into ureter   Medications  amLODIPine 5 mg oral  tablet, 5 mg= 1 tab(s), Oral, Daily, 11 refills  atorvastatin 40 mg oral tablet, See Instructions, 1 refills  baclofen 20 mg oral tablet, See Instructions, 5 refills  Basaglar KwikPen 100 units/mL subcutaneous solution, 30 units, Subcutaneous, Daily, 11 refills  BD INSULIN PEN NEEDLE 32G X 4, See Instructions, 5 refills  BD UF MADELINE PEN NEEDLE 0RAD06H, See Instructions, 4 refills  Bentyl 10 mg oral capsule, 10 mg= 1 cap(s), Oral, BID,? ?Not taking: Last Dose Date/Time Unknown  carvedilol 6.25 mg oral tablet, See Instructions, 6 refills  clobetasol 0.05% topical ointment, See Instructions, 1 refills,? ?Not Taking, Completed Rx: Last Dose Date/Time Unknown  clobetasol 0.05% topical ointment, See Instructions, 1 refills  dicyclomine 20 mg oral tablet, 20 mg= 1 tab(s), Oral, QID  gabapentin 600 mg oral tablet, 600 mg= 1 tab(s), Oral, TID, 11 refills  ibuprofen 800 mg oral tablet, 800 mg= 1 tab(s), Oral, TID  influenza virus vaccine, quadrivalent inactivated (Egg Free), 0.5 mL, IM, Once-Unscheduled  Lidocaine Viscous 2% mucous membrane solution, 15 mL/EA, N/A, Once  lisinopril 2.5 mg oral tablet, 2.5 mg= 1 tab(s), Oral, Daily  lisinopril 20 mg oral tablet, 20 mg= 1 tab(s), Oral, Daily, 3 refills,? ?Not taking: Last Dose Date/Time Unknown  loratadine 10 mg oral tablet, 10 mg= 1 tab(s), Oral, Daily, 1 refills  meloxicam 7.5 mg oral tablet, See Instructions, 2 refills  metformin 500 mg oral tablet, 500 mg= 1 tab(s), Oral, BID, 3 refills  OMEPRAZOLE CAP 20MG, 20 mg= 1 cap(s), Oral, Daily  omeprazole 20 mg oral EC tablet (pt. own), 20 mg= 1 tab(s), Oral, Daily, 3 refills  ondansetron 4 mg oral tablet, disintegrating  Percocet 5/325, 1 tab(s), Oral, Once  traZODone 100 mg oral tablet, 100 mg= 1 tab(s), Oral, Once a day (at bedtime), 11 refills  Allergies  ibuprofen  Social History  Abuse/Neglect  No, No, Yes, 10/21/2019  Alcohol - Denies Alcohol Use, 08/25/2014  Never, 09/07/2018  Employment/School  disabled, Work/School  description: DISABILITY. Operates hazardous equipment: No., 03/10/2017  Exercise  Self assessment: Poor condition., 06/25/2015  Home/Environment  Lives with Alone. Living situation: Home/Independent. Alcohol abuse in household: No. Substance abuse in household: No. Smoker in household: No. Injuries/Abuse/Neglect in household: No. Feels unsafe at home: No. Family/Friends available for support: Yes. Concern for family members at home: No. Major illness in household: No. Financial concerns: No. TV/Computer concerns: No., 09/06/2016  Nutrition/Health  Diabetic, Sleeping concerns: No. Feels highly stressed: No., 12/15/2015  Regular, Caffeine intake amount: 1-2 cand of soda a day. Wants to lose weight: No. Sleeping concerns: No. Feels highly stressed: Yes., 06/25/2015  Substance Use - Denies Substance Abuse, 08/25/2014  Never, 09/07/2018  Never, 09/06/2016  Tobacco - Denies Tobacco Use, 08/25/2014  Never (less than 100 in lifetime), N/A, 10/21/2019  Family History  Cancer: Brother.  Diabetes: Mother, Father and Brother.  Heart disease: Father.  Hypertension.: Father and Sister.  Immunizations  Vaccine Date Status Comments   influenza virus vaccine, inactivated 10/24/2018 Given Patient tolerated procedure well.   influenza virus vaccine, inactivated 11/14/2017 Recorded    influenza virus vaccine, inactivated 12/15/2015 Given    Health Maintenance  Health Maintenance  ???Pending?(in the next year)  ??? ??OverDue  ??? ? ? ?Advance Directive due??01/01/19??and every 1??year(s)  ??? ? ? ?Alcohol Misuse Screening due??01/01/19??and every 1??year(s)  ??? ? ? ?Functional Assessment due??01/01/19??and every 1??year(s)  ??? ? ? ?Diabetes Maintenance-Foot Exam due??09/07/19??and every 1??year(s)  ??? ??Due?  ??? ? ? ?Aspirin Therapy for CVD Prevention due??10/21/19??and every 1??year(s)  ??? ? ? ?Pneumococcal Vaccine due??10/21/19??Variable frequency  ??? ? ? ?Tetanus Vaccine due??10/21/19??and every 10??year(s)  ??? ? ? ?Zoster  Vaccine due??10/21/19??and every 100??year(s)  ??? ??Due In Future?  ??? ? ? ?Cognitive Screening not due until??01/01/20??and every 1??year(s)  ??? ? ? ?Fall Risk Assessment not due until??01/01/20??and every 1??year(s)  ??? ? ? ?Geriatric Depression Screening not due until??01/01/20??and every 1??year(s)  ??? ? ? ?Obesity Screening not due until??01/01/20??and every 1??year(s)  ??? ? ? ?Diabetes Maintenance-Fasting Lipid Profile not due until??02/29/20??and every 1??year(s)  ??? ? ? ?Diabetes Maintenance-HgbA1c not due until??02/29/20??and every 1??year(s)  ??? ? ? ?Diabetes Maintenance-Serum Creatinine not due until??07/03/20??and every 1??year(s)  ??? ? ? ?Hypertension Management-BMP not due until??07/05/20??and every 1??year(s)  ??? ? ? ?Bone Density Screening not due until??08/08/20??and every 2??year(s)  ??? ? ? ?Diabetes Maintenance-Eye Exam not due until??08/22/20??and every 1??year(s)  ??? ? ? ?Diabetes Maintenance-Urine Dipstick not due until??08/30/20??and every 1??year(s)  ??? ? ? ?Hypertension Management-Blood Pressure not due until??10/20/20??and every 1??year(s)  ???Satisfied?(in the past 1 year)  ??? ??Satisfied?  ??? ? ? ?ADL Screening on??10/21/19.??Satisfied by Jerald PASCAL, Taras Aerial  ??? ? ? ?Blood Pressure Screening on??10/21/19.??Satisfied by Jerald PASCAL, Taras Aerial  ??? ? ? ?Body Mass Index Check on??10/21/19.??Satisfied by Taras Marques LPN Aerial  ??? ? ? ?Breast Cancer Screening ([facility]) on??10/16/19.??Satisfied by Malu De Jesus  ??? ? ? ?Cognitive Screening on??10/21/19.??Satisfied by Taras Marques LPN Aerial  ??? ? ? ?Depression Screening on??10/21/19.??Satisfied by Taras Marques LPN Aerial  ??? ? ? ?Diabetes Maintenance-Urine Dipstick on??08/30/19.??Satisfied by Taras Boggs  ??? ? ? ?Diabetes Screening on??07/03/19.??Satisfied by Bertin Ayers  ??? ? ? ?Fall Risk Assessment on??10/21/19.??Satisfied by Taras Marques LPN  ??? ? ? ?Geriatric Depression  Screening on??10/21/19.??Satisfied by Jerald PASCAL, Taras Aerial  ??? ? ? ?Hypertension Management-Blood Pressure on??10/21/19.??Satisfied by Jerald PASCAL, Taras Aerial  ??? ? ? ?Influenza Vaccine on??10/24/18.??Satisfied by Jerald PASCAL, Taras Aerial  ??? ? ? ?Lipid Screening on??03/01/19.??Satisfied by Yasmine Nugent  ??? ? ? ?Obesity Screening on??10/21/19.??Satisfied by Jerald PASCAL, Taras Aerial  ?

## 2022-05-19 ENCOUNTER — OFFICE VISIT (OUTPATIENT)
Dept: INTERNAL MEDICINE | Facility: CLINIC | Age: 72
End: 2022-05-19
Payer: MEDICARE

## 2022-05-19 ENCOUNTER — LAB VISIT (OUTPATIENT)
Dept: LAB | Facility: HOSPITAL | Age: 72
End: 2022-05-19
Attending: NURSE PRACTITIONER
Payer: MEDICARE

## 2022-05-19 VITALS
BODY MASS INDEX: 31.27 KG/M2 | RESPIRATION RATE: 18 BRPM | WEIGHT: 183.19 LBS | HEART RATE: 76 BPM | HEIGHT: 64 IN | TEMPERATURE: 98 F | SYSTOLIC BLOOD PRESSURE: 131 MMHG | DIASTOLIC BLOOD PRESSURE: 83 MMHG

## 2022-05-19 DIAGNOSIS — I10 PRIMARY HYPERTENSION: ICD-10-CM

## 2022-05-19 DIAGNOSIS — Z79.4 TYPE 2 DIABETES MELLITUS WITH MICROALBUMINURIA, WITH LONG-TERM CURRENT USE OF INSULIN: ICD-10-CM

## 2022-05-19 DIAGNOSIS — E78.2 MIXED HYPERLIPIDEMIA: Chronic | ICD-10-CM

## 2022-05-19 DIAGNOSIS — E11.29 TYPE 2 DIABETES MELLITUS WITH MICROALBUMINURIA, WITH LONG-TERM CURRENT USE OF INSULIN: ICD-10-CM

## 2022-05-19 DIAGNOSIS — G89.29 CHRONIC RIGHT-SIDED LOW BACK PAIN WITH RIGHT-SIDED SCIATICA: Chronic | ICD-10-CM

## 2022-05-19 DIAGNOSIS — M54.41 CHRONIC RIGHT-SIDED LOW BACK PAIN WITH RIGHT-SIDED SCIATICA: Chronic | ICD-10-CM

## 2022-05-19 DIAGNOSIS — R80.9 TYPE 2 DIABETES MELLITUS WITH MICROALBUMINURIA, WITH LONG-TERM CURRENT USE OF INSULIN: ICD-10-CM

## 2022-05-19 DIAGNOSIS — N18.2 STAGE 2 CHRONIC KIDNEY DISEASE DUE TO TYPE 2 DIABETES MELLITUS: Chronic | ICD-10-CM

## 2022-05-19 DIAGNOSIS — I10 PRIMARY HYPERTENSION: Primary | ICD-10-CM

## 2022-05-19 DIAGNOSIS — E11.22 STAGE 2 CHRONIC KIDNEY DISEASE DUE TO TYPE 2 DIABETES MELLITUS: Chronic | ICD-10-CM

## 2022-05-19 DIAGNOSIS — E66.9 CLASS 1 OBESITY WITH SERIOUS COMORBIDITY AND BODY MASS INDEX (BMI) OF 31.0 TO 31.9 IN ADULT, UNSPECIFIED OBESITY TYPE: Chronic | ICD-10-CM

## 2022-05-19 DIAGNOSIS — F32.A DEPRESSION, UNSPECIFIED DEPRESSION TYPE: Chronic | ICD-10-CM

## 2022-05-19 PROBLEM — M54.9 BACK PAIN: Status: ACTIVE | Noted: 2022-05-19

## 2022-05-19 PROBLEM — E66.811 CLASS 1 OBESITY WITH SERIOUS COMORBIDITY AND BODY MASS INDEX (BMI) OF 31.0 TO 31.9 IN ADULT: Chronic | Status: ACTIVE | Noted: 2022-05-19

## 2022-05-19 LAB
ALBUMIN SERPL-MCNC: 3.8 GM/DL (ref 3.4–4.8)
ALBUMIN/GLOB SERPL: 1 RATIO (ref 1.1–2)
ALP SERPL-CCNC: 96 UNIT/L (ref 40–150)
ALT SERPL-CCNC: 12 UNIT/L (ref 0–55)
APPEARANCE UR: CLEAR
AST SERPL-CCNC: 13 UNIT/L (ref 5–34)
BACTERIA #/AREA URNS AUTO: ABNORMAL /HPF
BASOPHILS # BLD AUTO: 0.02 X10(3)/MCL (ref 0–0.2)
BASOPHILS NFR BLD AUTO: 0.2 %
BILIRUB UR QL STRIP.AUTO: NEGATIVE MG/DL
BILIRUBIN DIRECT+TOT PNL SERPL-MCNC: 0.6 MG/DL
BUN SERPL-MCNC: 15.9 MG/DL (ref 9.8–20.1)
CALCIUM SERPL-MCNC: 9.5 MG/DL (ref 8.4–10.2)
CHLORIDE SERPL-SCNC: 106 MMOL/L (ref 98–107)
CHOLEST SERPL-MCNC: 110 MG/DL
CHOLEST/HDLC SERPL: 3 {RATIO} (ref 0–5)
CO2 SERPL-SCNC: 29 MMOL/L (ref 23–31)
COLOR UR AUTO: ABNORMAL
CREAT SERPL-MCNC: 1.05 MG/DL (ref 0.55–1.02)
CREAT UR-MCNC: 99.9 MG/DL (ref 47–110)
EOSINOPHIL # BLD AUTO: 0.2 X10(3)/MCL (ref 0–0.9)
EOSINOPHIL NFR BLD AUTO: 2.2 %
ERYTHROCYTE [DISTWIDTH] IN BLOOD BY AUTOMATED COUNT: 14.8 % (ref 11.5–17)
EST. AVERAGE GLUCOSE BLD GHB EST-MCNC: 182.9 MG/DL
GLOBULIN SER-MCNC: 3.9 GM/DL (ref 2.4–3.5)
GLUCOSE SERPL-MCNC: 115 MG/DL (ref 82–115)
GLUCOSE UR QL STRIP.AUTO: NORMAL MG/DL
HBA1C MFR BLD: 8 %
HCT VFR BLD AUTO: 38 % (ref 37–47)
HDLC SERPL-MCNC: 34 MG/DL (ref 35–60)
HGB BLD-MCNC: 12 GM/DL (ref 12–16)
HYALINE CASTS #/AREA URNS LPF: ABNORMAL /LPF
IMM GRANULOCYTES # BLD AUTO: 0.04 X10(3)/MCL (ref 0–0.02)
IMM GRANULOCYTES NFR BLD AUTO: 0.4 % (ref 0–0.43)
KETONES UR QL STRIP.AUTO: NEGATIVE MG/DL
LDLC SERPL CALC-MCNC: 56 MG/DL (ref 50–140)
LEUKOCYTE ESTERASE UR QL STRIP.AUTO: 75 UNIT/L
LYMPHOCYTES # BLD AUTO: 2.43 X10(3)/MCL (ref 0.6–4.6)
LYMPHOCYTES NFR BLD AUTO: 26.2 %
MCH RBC QN AUTO: 27.8 PG (ref 27–31)
MCHC RBC AUTO-ENTMCNC: 31.6 MG/DL (ref 33–36)
MCV RBC AUTO: 88.2 FL (ref 80–94)
MICROALBUMIN UR-MCNC: <5 UG/ML
MICROALBUMIN/CREAT RATIO PNL UR: <5 MG/GM CR (ref 0–30)
MONOCYTES # BLD AUTO: 0.48 X10(3)/MCL (ref 0.1–1.3)
MONOCYTES NFR BLD AUTO: 5.2 %
MUCOUS THREADS URNS QL MICRO: ABNORMAL /LPF
NEUTROPHILS # BLD AUTO: 6.1 X10(3)/MCL (ref 2.1–9.2)
NEUTROPHILS NFR BLD AUTO: 65.8 %
NITRITE UR QL STRIP.AUTO: NEGATIVE
NRBC BLD AUTO-RTO: 0 %
PH UR STRIP.AUTO: 6.5 [PH]
PLATELET # BLD AUTO: 333 X10(3)/MCL (ref 130–400)
PMV BLD AUTO: 9 FL (ref 9.4–12.4)
POTASSIUM SERPL-SCNC: 4.2 MMOL/L (ref 3.5–5.1)
PROT SERPL-MCNC: 7.7 GM/DL (ref 5.8–7.6)
PROT UR QL STRIP.AUTO: NEGATIVE MG/DL
RBC # BLD AUTO: 4.31 X10(6)/MCL (ref 4.2–5.4)
RBC #/AREA URNS AUTO: ABNORMAL /HPF
RBC UR QL AUTO: NEGATIVE UNIT/L
SODIUM SERPL-SCNC: 142 MMOL/L (ref 136–145)
SP GR UR STRIP.AUTO: 1.01
SQUAMOUS #/AREA URNS LPF: ABNORMAL /HPF
TRIGL SERPL-MCNC: 98 MG/DL (ref 37–140)
TSH SERPL-ACNC: 2.62 UIU/ML (ref 0.35–4.94)
UROBILINOGEN UR STRIP-ACNC: NORMAL MG/DL
VLDLC SERPL CALC-MCNC: 20 MG/DL
WBC # SPEC AUTO: 9.3 X10(3)/MCL (ref 4.5–11.5)
WBC #/AREA URNS AUTO: ABNORMAL /HPF

## 2022-05-19 PROCEDURE — 3008F BODY MASS INDEX DOCD: CPT | Mod: CPTII,,, | Performed by: NURSE PRACTITIONER

## 2022-05-19 PROCEDURE — 3079F DIAST BP 80-89 MM HG: CPT | Mod: CPTII,,, | Performed by: NURSE PRACTITIONER

## 2022-05-19 PROCEDURE — 3008F PR BODY MASS INDEX (BMI) DOCUMENTED: ICD-10-PCS | Mod: CPTII,,, | Performed by: NURSE PRACTITIONER

## 2022-05-19 PROCEDURE — 3288F FALL RISK ASSESSMENT DOCD: CPT | Mod: CPTII,,, | Performed by: NURSE PRACTITIONER

## 2022-05-19 PROCEDURE — 1125F AMNT PAIN NOTED PAIN PRSNT: CPT | Mod: CPTII,,, | Performed by: NURSE PRACTITIONER

## 2022-05-19 PROCEDURE — 84443 ASSAY THYROID STIM HORMONE: CPT

## 2022-05-19 PROCEDURE — 1101F PT FALLS ASSESS-DOCD LE1/YR: CPT | Mod: CPTII,,, | Performed by: NURSE PRACTITIONER

## 2022-05-19 PROCEDURE — 3075F SYST BP GE 130 - 139MM HG: CPT | Mod: CPTII,,, | Performed by: NURSE PRACTITIONER

## 2022-05-19 PROCEDURE — 1159F MED LIST DOCD IN RCRD: CPT | Mod: CPTII,,, | Performed by: NURSE PRACTITIONER

## 2022-05-19 PROCEDURE — 81001 URINALYSIS AUTO W/SCOPE: CPT

## 2022-05-19 PROCEDURE — 80061 LIPID PANEL: CPT

## 2022-05-19 PROCEDURE — 1159F PR MEDICATION LIST DOCUMENTED IN MEDICAL RECORD: ICD-10-PCS | Mod: CPTII,,, | Performed by: NURSE PRACTITIONER

## 2022-05-19 PROCEDURE — 3075F PR MOST RECENT SYSTOLIC BLOOD PRESS GE 130-139MM HG: ICD-10-PCS | Mod: CPTII,,, | Performed by: NURSE PRACTITIONER

## 2022-05-19 PROCEDURE — 99214 PR OFFICE/OUTPT VISIT, EST, LEVL IV, 30-39 MIN: ICD-10-PCS | Mod: S$PBB,,, | Performed by: NURSE PRACTITIONER

## 2022-05-19 PROCEDURE — 3052F PR MOST RECENT HEMOGLOBIN A1C LEVEL 8.0 - < 9.0%: ICD-10-PCS | Mod: CPTII,,, | Performed by: NURSE PRACTITIONER

## 2022-05-19 PROCEDURE — 99215 OFFICE O/P EST HI 40 MIN: CPT | Mod: PBBFAC | Performed by: NURSE PRACTITIONER

## 2022-05-19 PROCEDURE — 1125F PR PAIN SEVERITY QUANTIFIED, PAIN PRESENT: ICD-10-PCS | Mod: CPTII,,, | Performed by: NURSE PRACTITIONER

## 2022-05-19 PROCEDURE — 83036 HEMOGLOBIN GLYCOSYLATED A1C: CPT

## 2022-05-19 PROCEDURE — 3052F HG A1C>EQUAL 8.0%<EQUAL 9.0%: CPT | Mod: CPTII,,, | Performed by: NURSE PRACTITIONER

## 2022-05-19 PROCEDURE — 36415 COLL VENOUS BLD VENIPUNCTURE: CPT

## 2022-05-19 PROCEDURE — 3288F PR FALLS RISK ASSESSMENT DOCUMENTED: ICD-10-PCS | Mod: CPTII,,, | Performed by: NURSE PRACTITIONER

## 2022-05-19 PROCEDURE — 85025 COMPLETE CBC W/AUTO DIFF WBC: CPT

## 2022-05-19 PROCEDURE — 1160F RVW MEDS BY RX/DR IN RCRD: CPT | Mod: CPTII,,, | Performed by: NURSE PRACTITIONER

## 2022-05-19 PROCEDURE — 3061F NEG MICROALBUMINURIA REV: CPT | Mod: CPTII,,, | Performed by: NURSE PRACTITIONER

## 2022-05-19 PROCEDURE — 3079F PR MOST RECENT DIASTOLIC BLOOD PRESSURE 80-89 MM HG: ICD-10-PCS | Mod: CPTII,,, | Performed by: NURSE PRACTITIONER

## 2022-05-19 PROCEDURE — 82043 UR ALBUMIN QUANTITATIVE: CPT

## 2022-05-19 PROCEDURE — 99214 OFFICE O/P EST MOD 30 MIN: CPT | Mod: S$PBB,,, | Performed by: NURSE PRACTITIONER

## 2022-05-19 PROCEDURE — 3061F PR NEG MICROALBUMINURIA RESULT DOCUMENTED/REVIEW: ICD-10-PCS | Mod: CPTII,,, | Performed by: NURSE PRACTITIONER

## 2022-05-19 PROCEDURE — 1160F PR REVIEW ALL MEDS BY PRESCRIBER/CLIN PHARMACIST DOCUMENTED: ICD-10-PCS | Mod: CPTII,,, | Performed by: NURSE PRACTITIONER

## 2022-05-19 PROCEDURE — 80053 COMPREHEN METABOLIC PANEL: CPT

## 2022-05-19 PROCEDURE — 1101F PR PT FALLS ASSESS DOC 0-1 FALLS W/OUT INJ PAST YR: ICD-10-PCS | Mod: CPTII,,, | Performed by: NURSE PRACTITIONER

## 2022-05-19 PROCEDURE — 3066F NEPHROPATHY DOC TX: CPT | Mod: CPTII,,, | Performed by: NURSE PRACTITIONER

## 2022-05-19 PROCEDURE — 3066F PR DOCUMENTATION OF TREATMENT FOR NEPHROPATHY: ICD-10-PCS | Mod: CPTII,,, | Performed by: NURSE PRACTITIONER

## 2022-05-19 RX ORDER — ATORVASTATIN CALCIUM 80 MG/1
80 TABLET, FILM COATED ORAL DAILY
COMMUNITY
Start: 2022-04-26 | End: 2023-02-24 | Stop reason: SDUPTHER

## 2022-05-19 RX ORDER — PEN NEEDLE, DIABETIC 29 G X1/2"
NEEDLE, DISPOSABLE MISCELLANEOUS
COMMUNITY
Start: 2022-02-09

## 2022-05-19 RX ORDER — AMLODIPINE BESYLATE 5 MG/1
5 TABLET ORAL DAILY
COMMUNITY
Start: 2022-05-18 | End: 2022-06-10

## 2022-05-19 RX ORDER — PIOGLITAZONEHYDROCHLORIDE 30 MG/1
30 TABLET ORAL DAILY
Qty: 90 TABLET | Refills: 2 | Status: SHIPPED | OUTPATIENT
Start: 2022-05-19 | End: 2022-11-18 | Stop reason: SDUPTHER

## 2022-05-19 RX ORDER — METFORMIN HYDROCHLORIDE 500 MG/1
500 TABLET ORAL 2 TIMES DAILY
COMMUNITY
Start: 2022-05-05 | End: 2022-11-18

## 2022-05-19 RX ORDER — GABAPENTIN 600 MG/1
600 TABLET ORAL 3 TIMES DAILY
COMMUNITY
Start: 2022-04-18 | End: 2022-06-28 | Stop reason: SDUPTHER

## 2022-05-19 RX ORDER — CARVEDILOL 12.5 MG/1
12.5 TABLET ORAL 2 TIMES DAILY
COMMUNITY
Start: 2022-05-18 | End: 2023-02-24 | Stop reason: SDUPTHER

## 2022-05-19 RX ORDER — CYCLOBENZAPRINE HCL 10 MG
10 TABLET ORAL 2 TIMES DAILY PRN
COMMUNITY
Start: 2022-03-08 | End: 2022-06-10

## 2022-05-19 RX ORDER — PIOGLITAZONEHYDROCHLORIDE 30 MG/1
30 TABLET ORAL DAILY
COMMUNITY
Start: 2022-03-22 | End: 2022-05-19 | Stop reason: SDUPTHER

## 2022-05-19 RX ORDER — LORATADINE 10 MG/1
10 TABLET ORAL DAILY
COMMUNITY
End: 2022-06-10

## 2022-05-19 RX ORDER — NAPROXEN SODIUM 220 MG/1
81 TABLET, FILM COATED ORAL DAILY
COMMUNITY
Start: 2021-09-10

## 2022-05-19 RX ORDER — INSULIN GLARGINE 100 [IU]/ML
30 INJECTION, SOLUTION SUBCUTANEOUS NIGHTLY
COMMUNITY
Start: 2021-05-03 | End: 2022-11-11 | Stop reason: SDUPTHER

## 2022-05-19 RX ORDER — OMEPRAZOLE 40 MG/1
40 CAPSULE, DELAYED RELEASE ORAL DAILY
COMMUNITY
Start: 2022-05-05 | End: 2022-11-18 | Stop reason: SDUPTHER

## 2022-05-19 RX ORDER — DICYCLOMINE HYDROCHLORIDE 20 MG/1
20 TABLET ORAL 4 TIMES DAILY
COMMUNITY
Start: 2022-03-28 | End: 2022-07-19 | Stop reason: SDUPTHER

## 2022-05-19 NOTE — ASSESSMENT & PLAN NOTE
Continue Lantus 30 units Qhs, Metformin 500 mg bid  Continue home CBG monitoring.  Hypoglycemic episodes: denies  BMI: 31.66  HgbA1c: 6.6  UA Creatinine: ordered  UA Microalbumin: ordered  On Atorvastatin  Weight Loss Encouraged  ADA Diet

## 2022-05-19 NOTE — ASSESSMENT & PLAN NOTE
Continue Flexeril and Gabapentin as prescribed  Patient refuses increase in Gabapentin  Patient will call clinic if she wants Physical Therapy referral or Orthopedic/Neurosurgery referral

## 2022-05-19 NOTE — ASSESSMENT & PLAN NOTE
B/P: 131/83  UA Creatinine: ordered  UA Microalbumin: ordered  [id number]  Continue Amlodipine 5 mg daily, Carvedilol 12.5 mg bid  DASH diet  Continue home blood pressure monitoring

## 2022-05-19 NOTE — ASSESSMENT & PLAN NOTE
Continue Atorvastatin 80 mg daily  Weight loss encouraged  Low fat/high fiber diet  Increase physical activity  Chol: 115  HDL: 51  Tri  LDL: 53

## 2022-05-19 NOTE — PROGRESS NOTES
Subjective:       Patient ID: Marnie Briggs is a 71 y.o. female.    Chief Complaint: Annual Exam and Depression    Patient has diagnosis of HTN, HLD, DM2, CKD. Patient seen in clinic today for back pain and depression. Patient denies harm to self or others. Patient states back pain is depressing because she is limited. Patient states she has been to physical therapy but it did not help and she doesn't want to go back. Patient states she has been to pain management but that didn't help either. Patient refuses referral to Orthopedic/Neurosurgery. Patient last seen in clinic on 11/30/2021 by Dr. Garcia. Patient given phone number to Endocrinology Clinic to reschedule her missed appointment in 11/2021.     Patient seen in ED on 03/22/2022 for chest pain. EKG interpretation: normal sinus rhythm with rate 84bpm, , , QTc 489, incomplete RBBB was also seen 6/2020 and appears unchanged, no STEMI. CXR reviewed, no acute process. 71 year old female with hx of hypertension, diabetes presents with chest pain for two days. VSS, well appearing, in no acute distress, normal work of breathing with clear lungs bilaterally, no hypoxia. Pain reproducible with palpation of chest wall, shoulder blade and range of motion to shoulder. EKG no ischemic change, incomplete RBBB was seen on previous study 2 years ago. Labs wnl including troponin x1. CXR clear without acute process. HEART Score 3 due to age, risk factors. Clinical picture more consistent with musculoskeletal etiology than ACS/PE/dissection. Given toradol and patient reports improvement in symptoms. Given lidocaine patch and norco as well and will d/c with rx for toradol. Given strict return precautions for worsening pain or development of dyspnea etc. Advised patient to follow up with her cardiologist in [place] to obtain outpatient stress testing. Stable for discharge.    Patient followed by Nephrology Clinic for CKD. Last appointment on 12/10/2021. PMH of CKD 2,  well controlled DM type II, HTN, HLD who presents to the nephrology clinic for routine follow-up.  Last seen a telemedicine visit in nephrology clinic on 9/10/2021.  Today she denies any acute complaints.  Denies chest pain, shortness of breath, nausea, vomiting, abdominal pain, dysuria, hematuria, increased frequency, pedal edema.  Denies any NSAID use.  She does have chronic back pain for which she takes tramadol. Patient to follow up in 6 months.     Patient followed by Ophthalmology Clinic. Last appointment on 2021.    Patient followed by GYN clinic. Last appointment on 2021. Pt is  (1 stillbirth at 6 months) here for annual GYN exam. Denies hx of abnormal pap smears. Hx of hysterectomy in  secondary to uterine fibroids. Denies any hot flashes. Denies any vaginal bleeding or discharge. Denies any abdominal or pelvic pain. Denies any urinary or breast complaints. Last MG-20-BIRADS 1. Denies any family hx of breast, uterine, or ovarian cancer. Pt has a hx of lichen sclerosus to the vaginal area and bilateral breast, diagnosed by dermatology with punch biopsy pathology report on 2015 was significant for lichen sclerosis. Denies itching, irritation, she is no longer using Clobetasol PRN.  Denies fly hx of breast, ovarian, uterine or colon cancer. [id number]. No complaints today. Patient to follow up in 1 year.    Patient followed by Endocrinology Clinic for DM2. Last appointment on 2021. History of uncontrolled type 2 diabetes with neuropathy, hypertension, hyperlipidemia, vitamin D deficiency.  Type 2 diabetes current A1c 6.7 previous 6.6, 10.2 and 10.4.  Patient has had improved A1c and type 2 diabetes is now controlled.  CBGs patient checks 2-3 times per day ranges  pt changes her inuslin to 20-30 units leading to hypoglycemia, reeducated on not changing basal insulin dosing.  Patient has occasional hypoglycemia in the 70s is when patient gives her higher doses of  basal insulin at night. Hypertension at goal today, hyperlipidemia Total 111 HDL 38 triglycerides 115 LDL 50 at goal per guidelines patient is currently on a statin.  Vitamin D deficiency current level 28.4 patient is to start over-the-counter vitamin D3 2000 IUs daily.  Hashimoto's recently diagnosed TSH 5.7204 Free T4 0.86 On previous labs on 2020 TPO positive TPO quantitative 1130.  Patient denies fatigue or weight gain.  We will continue to monitor repeat on follow-up visit. Medications: continue Metformin 500 mg BID, Actos 30mg. Change Lantus to 25 units daily. Patient was to follow up in 6 months but missed appointment.     Mammogram: 2021, negative  Pap: Hysterectomy  FIT: 2021, negative  Diabetic Eye Exam: 2021, negative  Diabetic Foot Exam: 2020  Bone Density: 2020, normal    Review of Systems      Objective:      Physical Exam    Assessment:       Problem List Items Addressed This Visit        Psychiatric    Depression (Chronic)     Mental Health referral completed           Relevant Orders    Ambulatory referral/consult to Behavioral Health       Cardiac/Vascular    Hypertension - Primary (Chronic)     B/P: 131/83  UA Creatinine: ordered  UA Microalbumin: ordered  EK2022  Continue Amlodipine 5 mg daily, Carvedilol 12.5 mg bid  DASH diet  Continue home blood pressure monitoring           Relevant Orders    CBC Auto Differential (Completed)    Comprehensive Metabolic Panel (Completed)    Microalbumin/Creatinine Ratio, Urine (Completed)    TSH (Completed)    Urinalysis, Reflex to Urine Culture Urine, Clean Catch (Completed)    Mixed hyperlipidemia (Chronic)     Continue Atorvastatin 80 mg daily  Weight loss encouraged  Low fat/high fiber diet  Increase physical activity  Chol: 115  HDL: 51  Tri  LDL: 53           Relevant Orders    Lipid Panel (Completed)       Renal/    Stage 2 chronic kidney disease due to type 2 diabetes mellitus (Chronic)     Followed  by Nephrology  BUN/Cr: 17/0.91  GFR: >60           Relevant Medications    insulin glargine (LANTUS) 100 unit/mL injection    metFORMIN (GLUCOPHAGE) 500 MG tablet    pioglitazone (ACTOS) 30 MG tablet       Endocrine    Type 2 diabetes mellitus with microalbuminuria, with long-term current use of insulin (Chronic)     Continue Lantus 30 units Qhs, Metformin 500 mg bid  Continue home CBG monitoring.  Hypoglycemic episodes: denies  BMI: 31.66  HgbA1c: 6.6  UA Creatinine: ordered  UA Microalbumin: ordered  On Atorvastatin  Weight Loss Encouraged  ADA Diet           Relevant Medications    insulin glargine (LANTUS) 100 unit/mL injection    metFORMIN (GLUCOPHAGE) 500 MG tablet    pioglitazone (ACTOS) 30 MG tablet    Other Relevant Orders    Microalbumin/Creatinine Ratio, Urine (Completed)    Urinalysis, Reflex to Urine Culture Urine, Clean Catch (Completed)    Hemoglobin A1C (Completed)    Class 1 obesity with serious comorbidity and body mass index (BMI) of 31.0 to 31.9 in adult (Chronic)     BMI: 31.66  Weight loss encouraged  Increase physical activity as much as tolerated              Orthopedic    Chronic right-sided low back pain with right-sided sciatica (Chronic)     Continue Flexeril and Gabapentin as prescribed  Patient refuses increase in Gabapentin  Patient will call clinic if she wants Physical Therapy referral or Orthopedic/Neurosurgery referral                 Plan:    Follow up in 6 months with labs to be done prior to visit.

## 2022-06-06 ENCOUNTER — LAB VISIT (OUTPATIENT)
Dept: LAB | Facility: HOSPITAL | Age: 72
End: 2022-06-06
Attending: INTERNAL MEDICINE
Payer: MEDICARE

## 2022-06-06 DIAGNOSIS — N18.2 CKD (CHRONIC KIDNEY DISEASE) STAGE 2, GFR 60-89 ML/MIN: ICD-10-CM

## 2022-06-06 LAB
ALBUMIN SERPL-MCNC: 3.5 GM/DL (ref 3.4–4.8)
ALBUMIN/GLOB SERPL: 1.1 RATIO (ref 1.1–2)
ALP SERPL-CCNC: 84 UNIT/L (ref 40–150)
ALT SERPL-CCNC: 15 UNIT/L (ref 0–55)
AST SERPL-CCNC: 13 UNIT/L (ref 5–34)
BASOPHILS # BLD AUTO: 0.03 X10(3)/MCL (ref 0–0.2)
BASOPHILS NFR BLD AUTO: 0.3 %
BILIRUBIN DIRECT+TOT PNL SERPL-MCNC: 0.4 MG/DL
BUN SERPL-MCNC: 13.5 MG/DL (ref 9.8–20.1)
CALCIUM SERPL-MCNC: 9.2 MG/DL (ref 8.4–10.2)
CHLORIDE SERPL-SCNC: 104 MMOL/L (ref 98–107)
CO2 SERPL-SCNC: 29 MMOL/L (ref 23–31)
CREAT SERPL-MCNC: 1.16 MG/DL (ref 0.55–1.02)
EOSINOPHIL # BLD AUTO: 0.26 X10(3)/MCL (ref 0–0.9)
EOSINOPHIL NFR BLD AUTO: 3 %
ERYTHROCYTE [DISTWIDTH] IN BLOOD BY AUTOMATED COUNT: 15.2 % (ref 11.5–17)
GLOBULIN SER-MCNC: 3.3 GM/DL (ref 2.4–3.5)
GLUCOSE SERPL-MCNC: 120 MG/DL (ref 82–115)
HCT VFR BLD AUTO: 37.8 % (ref 37–47)
HGB BLD-MCNC: 11.2 GM/DL (ref 12–16)
IMM GRANULOCYTES # BLD AUTO: 0.03 X10(3)/MCL (ref 0–0.02)
IMM GRANULOCYTES NFR BLD AUTO: 0.3 % (ref 0–0.43)
LYMPHOCYTES # BLD AUTO: 2.29 X10(3)/MCL (ref 0.6–4.6)
LYMPHOCYTES NFR BLD AUTO: 26.3 %
MCH RBC QN AUTO: 26.7 PG (ref 27–31)
MCHC RBC AUTO-ENTMCNC: 29.6 MG/DL (ref 33–36)
MCV RBC AUTO: 90.2 FL (ref 80–94)
MONOCYTES # BLD AUTO: 0.51 X10(3)/MCL (ref 0.1–1.3)
MONOCYTES NFR BLD AUTO: 5.9 %
NEUTROPHILS # BLD AUTO: 5.6 X10(3)/MCL (ref 2.1–9.2)
NEUTROPHILS NFR BLD AUTO: 64.2 %
NRBC BLD AUTO-RTO: 0 %
PLATELET # BLD AUTO: 290 X10(3)/MCL (ref 130–400)
PMV BLD AUTO: 9.2 FL (ref 9.4–12.4)
POTASSIUM SERPL-SCNC: 3.8 MMOL/L (ref 3.5–5.1)
PROT SERPL-MCNC: 6.8 GM/DL (ref 5.8–7.6)
RBC # BLD AUTO: 4.19 X10(6)/MCL (ref 4.2–5.4)
SODIUM SERPL-SCNC: 142 MMOL/L (ref 136–145)
WBC # SPEC AUTO: 8.7 X10(3)/MCL (ref 4.5–11.5)

## 2022-06-06 PROCEDURE — 80053 COMPREHEN METABOLIC PANEL: CPT

## 2022-06-06 PROCEDURE — 85025 COMPLETE CBC W/AUTO DIFF WBC: CPT

## 2022-06-06 PROCEDURE — 36415 COLL VENOUS BLD VENIPUNCTURE: CPT

## 2022-06-06 PROCEDURE — 84156 ASSAY OF PROTEIN URINE: CPT

## 2022-06-10 ENCOUNTER — OFFICE VISIT (OUTPATIENT)
Dept: NEPHROLOGY | Facility: CLINIC | Age: 72
End: 2022-06-10
Payer: MEDICARE

## 2022-06-10 VITALS
BODY MASS INDEX: 31.24 KG/M2 | TEMPERATURE: 99 F | HEART RATE: 71 BPM | HEIGHT: 64 IN | RESPIRATION RATE: 20 BRPM | SYSTOLIC BLOOD PRESSURE: 127 MMHG | OXYGEN SATURATION: 97 % | DIASTOLIC BLOOD PRESSURE: 82 MMHG | WEIGHT: 183 LBS

## 2022-06-10 DIAGNOSIS — N18.2 CKD STAGE G2/A1, GFR 60-89 AND ALBUMIN CREATININE RATIO <30 MG/G: Primary | ICD-10-CM

## 2022-06-10 DIAGNOSIS — I10 HYPERTENSION, UNSPECIFIED TYPE: ICD-10-CM

## 2022-06-10 PROBLEM — H26.9 CATARACT: Status: ACTIVE | Noted: 2022-06-10

## 2022-06-10 PROBLEM — L90.0 LICHEN SCLEROSUS ET ATROPHICUS: Status: ACTIVE | Noted: 2022-06-10

## 2022-06-10 PROBLEM — G62.9 NEUROPATHY: Status: ACTIVE | Noted: 2022-06-10

## 2022-06-10 PROBLEM — H35.372 EPIRETINAL MEMBRANE (ERM) OF LEFT EYE: Status: ACTIVE | Noted: 2022-06-10

## 2022-06-10 PROBLEM — G47.00 INSOMNIA: Status: ACTIVE | Noted: 2022-06-10

## 2022-06-10 PROBLEM — K21.9 GASTROESOPHAGEAL REFLUX DISEASE: Status: ACTIVE | Noted: 2022-06-10

## 2022-06-10 PROBLEM — E78.00 HYPERCHOLESTEROLEMIA: Status: ACTIVE | Noted: 2022-05-19

## 2022-06-10 PROBLEM — F41.9 ANXIETY: Status: ACTIVE | Noted: 2022-06-10

## 2022-06-10 PROBLEM — H25.10 NUCLEAR SENILE CATARACT: Status: ACTIVE | Noted: 2022-06-10

## 2022-06-10 PROBLEM — J32.9 SINUSITIS: Status: ACTIVE | Noted: 2022-06-10

## 2022-06-10 PROCEDURE — 4010F PR ACE/ARB THEARPY RXD/TAKEN: ICD-10-PCS | Mod: CPTII,,, | Performed by: NURSE PRACTITIONER

## 2022-06-10 PROCEDURE — 99214 OFFICE O/P EST MOD 30 MIN: CPT | Mod: PBBFAC | Performed by: NURSE PRACTITIONER

## 2022-06-10 PROCEDURE — 3079F PR MOST RECENT DIASTOLIC BLOOD PRESSURE 80-89 MM HG: ICD-10-PCS | Mod: CPTII,,, | Performed by: NURSE PRACTITIONER

## 2022-06-10 PROCEDURE — 1125F AMNT PAIN NOTED PAIN PRSNT: CPT | Mod: CPTII,,, | Performed by: NURSE PRACTITIONER

## 2022-06-10 PROCEDURE — 3288F FALL RISK ASSESSMENT DOCD: CPT | Mod: CPTII,,, | Performed by: NURSE PRACTITIONER

## 2022-06-10 PROCEDURE — 3008F BODY MASS INDEX DOCD: CPT | Mod: CPTII,,, | Performed by: NURSE PRACTITIONER

## 2022-06-10 PROCEDURE — 99214 OFFICE O/P EST MOD 30 MIN: CPT | Mod: S$PBB,,, | Performed by: NURSE PRACTITIONER

## 2022-06-10 PROCEDURE — 3066F PR DOCUMENTATION OF TREATMENT FOR NEPHROPATHY: ICD-10-PCS | Mod: CPTII,,, | Performed by: NURSE PRACTITIONER

## 2022-06-10 PROCEDURE — 1159F PR MEDICATION LIST DOCUMENTED IN MEDICAL RECORD: ICD-10-PCS | Mod: CPTII,,, | Performed by: NURSE PRACTITIONER

## 2022-06-10 PROCEDURE — 3066F NEPHROPATHY DOC TX: CPT | Mod: CPTII,,, | Performed by: NURSE PRACTITIONER

## 2022-06-10 PROCEDURE — 1159F MED LIST DOCD IN RCRD: CPT | Mod: CPTII,,, | Performed by: NURSE PRACTITIONER

## 2022-06-10 PROCEDURE — 1101F PR PT FALLS ASSESS DOC 0-1 FALLS W/OUT INJ PAST YR: ICD-10-PCS | Mod: CPTII,,, | Performed by: NURSE PRACTITIONER

## 2022-06-10 PROCEDURE — 3074F SYST BP LT 130 MM HG: CPT | Mod: CPTII,,, | Performed by: NURSE PRACTITIONER

## 2022-06-10 PROCEDURE — 3288F PR FALLS RISK ASSESSMENT DOCUMENTED: ICD-10-PCS | Mod: CPTII,,, | Performed by: NURSE PRACTITIONER

## 2022-06-10 PROCEDURE — 1160F RVW MEDS BY RX/DR IN RCRD: CPT | Mod: CPTII,,, | Performed by: NURSE PRACTITIONER

## 2022-06-10 PROCEDURE — 3061F PR NEG MICROALBUMINURIA RESULT DOCUMENTED/REVIEW: ICD-10-PCS | Mod: CPTII,,, | Performed by: NURSE PRACTITIONER

## 2022-06-10 PROCEDURE — 3061F NEG MICROALBUMINURIA REV: CPT | Mod: CPTII,,, | Performed by: NURSE PRACTITIONER

## 2022-06-10 PROCEDURE — 3074F PR MOST RECENT SYSTOLIC BLOOD PRESSURE < 130 MM HG: ICD-10-PCS | Mod: CPTII,,, | Performed by: NURSE PRACTITIONER

## 2022-06-10 PROCEDURE — 1101F PT FALLS ASSESS-DOCD LE1/YR: CPT | Mod: CPTII,,, | Performed by: NURSE PRACTITIONER

## 2022-06-10 PROCEDURE — 4010F ACE/ARB THERAPY RXD/TAKEN: CPT | Mod: CPTII,,, | Performed by: NURSE PRACTITIONER

## 2022-06-10 PROCEDURE — 3079F DIAST BP 80-89 MM HG: CPT | Mod: CPTII,,, | Performed by: NURSE PRACTITIONER

## 2022-06-10 PROCEDURE — 1160F PR REVIEW ALL MEDS BY PRESCRIBER/CLIN PHARMACIST DOCUMENTED: ICD-10-PCS | Mod: CPTII,,, | Performed by: NURSE PRACTITIONER

## 2022-06-10 PROCEDURE — 3008F PR BODY MASS INDEX (BMI) DOCUMENTED: ICD-10-PCS | Mod: CPTII,,, | Performed by: NURSE PRACTITIONER

## 2022-06-10 PROCEDURE — 1125F PR PAIN SEVERITY QUANTIFIED, PAIN PRESENT: ICD-10-PCS | Mod: CPTII,,, | Performed by: NURSE PRACTITIONER

## 2022-06-10 PROCEDURE — 99214 PR OFFICE/OUTPT VISIT, EST, LEVL IV, 30-39 MIN: ICD-10-PCS | Mod: S$PBB,,, | Performed by: NURSE PRACTITIONER

## 2022-06-10 RX ORDER — DIPHENHYDRAMINE HCL 25 MG
25 TABLET ORAL DAILY
COMMUNITY
End: 2022-07-27

## 2022-06-10 RX ORDER — LISINOPRIL 10 MG/1
10 TABLET ORAL DAILY
Qty: 90 TABLET | Refills: 0 | Status: SHIPPED | OUTPATIENT
Start: 2022-06-10 | End: 2022-06-13

## 2022-06-10 NOTE — PROGRESS NOTES
"[facility] Nephrology Clinic Note  Chief Complaint   Patient presents with    Chronic Kidney Disease     Follow up        History of Present Illness  Ms. Briggs is a 71-year-old  female with past medical history of diabetes mellitus type 2 (diagnosed in her 20s), chronic kidney disease, hypertension, dyslipidemia, and obesity.  Presents for follow-up appointment in Nephrology Clinic today.  Denies complaints.     Review of Systems  Twelve point review of systems conducted, negative except as stated in history of present illness.    Review of patient's allergies indicates:  No Known Allergies    Past Medical History:   Past Medical History:   Diagnosis Date    CKD (chronic kidney disease) stage 2, GFR 60-89 ml/min     DM (diabetes mellitus)     HLD (hyperlipidemia)     HTN (hypertension)        Procedure History:   Past Surgical History:   Procedure Laterality Date    ANKLE FUSION      CHOLECYSTECTOMY      COLONOSCOPY      EYE SURGERY      HYSTERECTOMY      NECK SURGERY      stint      Right kidney     Family History: family history includes Cancer in her brother; Diabetes in her brother, father, and mother; Heart disease in her father; Hypertension in her father; Stroke in her mother.    Social History:  reports that she has never smoked. She has never used smokeless tobacco. She reports that she does not drink alcohol and does not use drugs.    Physical Exam:   /82 (BP Location: Left arm, Patient Position: Sitting, BP Method: Large (Automatic))   Pulse 71   Temp 98.6 °F (37 °C) (Oral)   Resp 20   Ht 5' 4.17" (1.63 m)   Wt 83 kg (183 lb)   SpO2 97%   BMI 31.24 kg/m²  Body mass index is 31.24 kg/m².  General appearance: Patient is in no acute distress.  Skin: No rashes or wounds.  HEENT: PERRLA, EOMI, no scleral icterus, no JVD. Neck is supple.  Chest: Respirations are unlabored. Lungs sounds are clear.   Heart: S1, S2.   Abdomen: Benign.  : Deferred.  Extremities: BLE trace " "edema, peripheral pulses are palpable.   Neuro: No focal deficits.     Home Medications:  Current Outpatient Medications   Medication Sig    aspirin 81 MG Chew Take 81 mg by mouth once daily at 6am.    atorvastatin (LIPITOR) 80 MG tablet Take 80 mg by mouth once daily.    carvediloL (COREG) 12.5 MG tablet Take 12.5 mg by mouth 2 (two) times daily.    dicyclomine (BENTYL) 20 mg tablet Take 20 mg by mouth 4 (four) times daily.    diphenhydrAMINE (SOMINEX) 25 mg tablet Take 25 mg by mouth once daily.    gabapentin (NEURONTIN) 600 MG tablet Take 600 mg by mouth 3 (three) times daily.    insulin glargine (LANTUS) 100 unit/mL injection Inject 30 Units into the skin every evening.    metFORMIN (GLUCOPHAGE) 500 MG tablet Take 500 mg by mouth 2 (two) times daily.    omeprazole (PRILOSEC) 40 MG capsule Take 40 mg by mouth once daily.    pen needle, diabetic 29 gauge x 1/2" Ndle   Insulin pen needles, See Instructions, Inuslin pen needles for once a day Lantus injection  E11.65, # 90 EA, 3 Refill(s), Pharmacy: [facility] [id number] [id number], 162, cm, Height/Length Dosing, 12/10/21 9:34:00 CST, 90.2, kg, Weight Dosing, 12/10/21 9:34:00 CST    pioglitazone (ACTOS) 30 MG tablet Take 1 tablet (30 mg total) by mouth once daily.    lisinopriL 10 MG tablet Take 1 tablet (10 mg total) by mouth once daily.    loratadine (CLARITIN) 10 mg tablet Take 10 mg by mouth once daily at 6am.     No current facility-administered medications for this visit.      Laboratory Data:   Recent Results (from the past 504 hour(s))   Comprehensive Metabolic Panel    Collection Time: 06/06/22  7:12 AM   Result Value Ref Range    Sodium Level 142 136 - 145 mmol/L    Potassium Level 3.8 3.5 - 5.1 mmol/L    Chloride 104 98 - 107 mmol/L    Carbon Dioxide 29 23 - 31 mmol/L    Glucose Level 120 (H) 82 - 115 mg/dL    Blood Urea Nitrogen 13.5 9.8 - 20.1 mg/dL    Creatinine 1.16 (H) 0.55 - 1.02 mg/dL    Calcium Level Total 9.2 8.4 - 10.2 mg/dL    Protein Total " 6.8 5.8 - 7.6 gm/dL    Albumin Level 3.5 3.4 - 4.8 gm/dL    Globulin 3.3 2.4 - 3.5 gm/dL    Albumin/Globulin Ratio 1.1 1.1 - 2.0 ratio    Bilirubin Total 0.4 <=1.5 mg/dL    Alkaline Phosphatase 84 40 - 150 unit/L    Alanine Aminotransferase 15 0 - 55 unit/L    Aspartate Aminotransferase 13 5 - 34 unit/L    Estimated GFR- 60 mls/min/1.73/m2   Protein/Creatinine Ratio, Urine    Collection Time: 06/06/22  7:12 AM   Result Value Ref Range    Urine Protein Level <6.8 mg/dL    Urine Creatinine 55.6 47.0 - 110.0 mg/dL    Urine Protein/Creatinine Ratio <122.3 <=200.0 mg/gm Cr   CBC with Differential    Collection Time: 06/06/22  7:12 AM   Result Value Ref Range    WBC 8.7 4.5 - 11.5 x10(3)/mcL    RBC 4.19 (L) 4.20 - 5.40 x10(6)/mcL    Hgb 11.2 (L) 12.0 - 16.0 gm/dL    Hct 37.8 37.0 - 47.0 %    MCV 90.2 80.0 - 94.0 fL    MCH 26.7 (L) 27.0 - 31.0 pg    MCHC 29.6 (L) 33.0 - 36.0 mg/dL    RDW 15.2 11.5 - 17.0 %    Platelet 290 130 - 400 x10(3)/mcL    MPV 9.2 (L) 9.4 - 12.4 fL    Neut % 64.2 %    Lymph % 26.3 %    Mono % 5.9 %    Eos % 3.0 %    Basophil % 0.3 %    Lymph # 2.29 0.6 - 4.6 x10(3)/mcL    Neut # 5.6 2.1 - 9.2 x10(3)/mcL    Mono # 0.51 0.1 - 1.3 x10(3)/mcL    Eos # 0.26 0 - 0.9 x10(3)/mcL    Baso # 0.03 0 - 0.2 x10(3)/mcL    IG# 0.03 (H) 0 - 0.0155 x10(3)/mcL    IG% 0.3 0 - 0.43 %    NRBC% 0.0 %       Imaging:  US Retroperitoneum Limited  FINDINGS: The right kidney measures 10.0 cm. The left kidney measures  9.3 cm. No significant collecting system dilatation. No defined renal  calcification.     IMPRESSION: Kidneys within normal limits.    Signature Line  Electronically Signed By: Eric Goodman MD  Date/Time Signed: 11/22/2021 14:55    Impression and Plan     CKD stage G2/A1, GFR 60-89 and albumin creatinine ratio <30 mg/g  -     Comprehensive Metabolic Panel; Future; Expected date: 09/10/2022    Diabetic kidney disease. Patient would benefit from RAAS blockade. Will stop amlodipine (patient has  mild LE edema) and start lisinopril 10 mg daily.   HgA1C is above goal, SGLT2i therapy might be a consideration, but will defer decision to start to PCP.     Continue:  -follow 2 g a day dietary sodium restriction  -controlled diabetes (goal A1c less than 7%)  -control high blood pressure (goal blood pressure is less than 130/80, please check blood pressure twice a week and bring blood pressure logs to office visit)  -exercise at least 30 minutes a day, 5 days a week  -maintain healthy weight  -decrease or stop alcohol use  -do not smoke  -stay well hydrated (drink water only, avoid juices, sweet tea, and sodas)  -ask about staying up-to-date on vaccinations (flu vaccine, pneumonia vaccine, hepatitis B vaccine)  -avoid excessive use of NSAIDs (ibuprofen, naproxen, Aleve, Advil, Toradol, Mobic), take Tylenol as needed for headache or mild pain  -take cholesterol lowering medications if prescribed (LDL goal less than 100)    Follow-up with the primary care provider as scheduled.  Return to subspecialty nephrology (kidney) clinic with routine labs in 3 months     Hypertension, unspecified type  -     lisinopriL 10 MG tablet; Take 1 tablet (10 mg total) by mouth once daily.  Dispense: 90 tablet; Refill: 0    BMI 31.0-31.9,adult    Lifestyle and dietary interventions discussed, patient counseled on weight loss using portion control, non sedentary lifestyle, low-carbohydrate/low fat diet.

## 2022-06-13 DIAGNOSIS — I10 HYPERTENSION, UNSPECIFIED TYPE: Primary | ICD-10-CM

## 2022-06-13 RX ORDER — LISINOPRIL 10 MG/1
10 TABLET ORAL DAILY
Qty: 90 TABLET | Refills: 3 | Status: SHIPPED | OUTPATIENT
Start: 2022-06-13 | End: 2022-09-09 | Stop reason: SDUPTHER

## 2022-06-13 RX ORDER — LISINOPRIL 10 MG/1
10 TABLET ORAL DAILY
Qty: 90 TABLET | Refills: 3 | Status: SHIPPED | OUTPATIENT
Start: 2022-06-13 | End: 2023-02-24 | Stop reason: SDUPTHER

## 2022-06-13 NOTE — TELEPHONE ENCOUNTER
----- Message from Gala Barnes sent at 6/13/2022  8:19 AM CDT -----  Regarding: med refill  Pt called and stated a prescription for LISINOPRIL was supposed to be sent 6/10 to [facility] in [place]. Pt checked Friday and again this morning and is being told the RX is not there. Pt can be reached @ [phone]      Thank You  Annamaria WOODARD

## 2022-06-28 DIAGNOSIS — G89.29 OTHER CHRONIC PAIN: Primary | ICD-10-CM

## 2022-06-28 DIAGNOSIS — M54.41 ACUTE BACK PAIN WITH SCIATICA, RIGHT: ICD-10-CM

## 2022-06-28 RX ORDER — GABAPENTIN 600 MG/1
600 TABLET ORAL 3 TIMES DAILY
Qty: 90 TABLET | Refills: 2 | Status: SHIPPED | OUTPATIENT
Start: 2022-06-28 | End: 2022-09-28 | Stop reason: SDUPTHER

## 2022-07-26 RX ORDER — AMLODIPINE BESYLATE 5 MG/1
5 TABLET ORAL DAILY
COMMUNITY
Start: 2022-07-15 | End: 2022-09-09

## 2022-07-27 ENCOUNTER — OFFICE VISIT (OUTPATIENT)
Dept: INTERNAL MEDICINE | Facility: CLINIC | Age: 72
End: 2022-07-27
Payer: MEDICARE

## 2022-07-27 VITALS
BODY MASS INDEX: 31.07 KG/M2 | DIASTOLIC BLOOD PRESSURE: 83 MMHG | SYSTOLIC BLOOD PRESSURE: 116 MMHG | HEART RATE: 62 BPM | TEMPERATURE: 99 F | WEIGHT: 182 LBS

## 2022-07-27 DIAGNOSIS — F41.9 ANXIETY: Primary | ICD-10-CM

## 2022-07-27 DIAGNOSIS — F32.1 CURRENT MODERATE EPISODE OF MAJOR DEPRESSIVE DISORDER, UNSPECIFIED WHETHER RECURRENT: ICD-10-CM

## 2022-07-27 DIAGNOSIS — G47.9 DIFFICULTY SLEEPING: ICD-10-CM

## 2022-07-27 PROCEDURE — 1159F PR MEDICATION LIST DOCUMENTED IN MEDICAL RECORD: ICD-10-PCS | Mod: CPTII,,, | Performed by: NURSE PRACTITIONER

## 2022-07-27 PROCEDURE — 4010F PR ACE/ARB THEARPY RXD/TAKEN: ICD-10-PCS | Mod: CPTII,,, | Performed by: NURSE PRACTITIONER

## 2022-07-27 PROCEDURE — 3061F NEG MICROALBUMINURIA REV: CPT | Mod: CPTII,,, | Performed by: NURSE PRACTITIONER

## 2022-07-27 PROCEDURE — 1159F MED LIST DOCD IN RCRD: CPT | Mod: CPTII,,, | Performed by: NURSE PRACTITIONER

## 2022-07-27 PROCEDURE — 99215 OFFICE O/P EST HI 40 MIN: CPT | Mod: S$PBB,,, | Performed by: NURSE PRACTITIONER

## 2022-07-27 PROCEDURE — 3066F NEPHROPATHY DOC TX: CPT | Mod: CPTII,,, | Performed by: NURSE PRACTITIONER

## 2022-07-27 PROCEDURE — 1160F RVW MEDS BY RX/DR IN RCRD: CPT | Mod: CPTII,,, | Performed by: NURSE PRACTITIONER

## 2022-07-27 PROCEDURE — 1101F PT FALLS ASSESS-DOCD LE1/YR: CPT | Mod: CPTII,,, | Performed by: NURSE PRACTITIONER

## 2022-07-27 PROCEDURE — 3061F PR NEG MICROALBUMINURIA RESULT DOCUMENTED/REVIEW: ICD-10-PCS | Mod: CPTII,,, | Performed by: NURSE PRACTITIONER

## 2022-07-27 PROCEDURE — 99215 PR OFFICE/OUTPT VISIT, EST, LEVL V, 40-54 MIN: ICD-10-PCS | Mod: S$PBB,,, | Performed by: NURSE PRACTITIONER

## 2022-07-27 PROCEDURE — 3074F SYST BP LT 130 MM HG: CPT | Mod: CPTII,,, | Performed by: NURSE PRACTITIONER

## 2022-07-27 PROCEDURE — 3008F PR BODY MASS INDEX (BMI) DOCUMENTED: ICD-10-PCS | Mod: CPTII,,, | Performed by: NURSE PRACTITIONER

## 2022-07-27 PROCEDURE — 3288F FALL RISK ASSESSMENT DOCD: CPT | Mod: CPTII,,, | Performed by: NURSE PRACTITIONER

## 2022-07-27 PROCEDURE — 1160F PR REVIEW ALL MEDS BY PRESCRIBER/CLIN PHARMACIST DOCUMENTED: ICD-10-PCS | Mod: CPTII,,, | Performed by: NURSE PRACTITIONER

## 2022-07-27 PROCEDURE — 3008F BODY MASS INDEX DOCD: CPT | Mod: CPTII,,, | Performed by: NURSE PRACTITIONER

## 2022-07-27 PROCEDURE — 3079F DIAST BP 80-89 MM HG: CPT | Mod: CPTII,,, | Performed by: NURSE PRACTITIONER

## 2022-07-27 PROCEDURE — 3074F PR MOST RECENT SYSTOLIC BLOOD PRESSURE < 130 MM HG: ICD-10-PCS | Mod: CPTII,,, | Performed by: NURSE PRACTITIONER

## 2022-07-27 PROCEDURE — 1101F PR PT FALLS ASSESS DOC 0-1 FALLS W/OUT INJ PAST YR: ICD-10-PCS | Mod: CPTII,,, | Performed by: NURSE PRACTITIONER

## 2022-07-27 PROCEDURE — 4010F ACE/ARB THERAPY RXD/TAKEN: CPT | Mod: CPTII,,, | Performed by: NURSE PRACTITIONER

## 2022-07-27 PROCEDURE — 3079F PR MOST RECENT DIASTOLIC BLOOD PRESSURE 80-89 MM HG: ICD-10-PCS | Mod: CPTII,,, | Performed by: NURSE PRACTITIONER

## 2022-07-27 PROCEDURE — 99215 OFFICE O/P EST HI 40 MIN: CPT | Mod: PBBFAC | Performed by: NURSE PRACTITIONER

## 2022-07-27 PROCEDURE — 3066F PR DOCUMENTATION OF TREATMENT FOR NEPHROPATHY: ICD-10-PCS | Mod: CPTII,,, | Performed by: NURSE PRACTITIONER

## 2022-07-27 PROCEDURE — 3288F PR FALLS RISK ASSESSMENT DOCUMENTED: ICD-10-PCS | Mod: CPTII,,, | Performed by: NURSE PRACTITIONER

## 2022-07-27 RX ORDER — DULOXETIN HYDROCHLORIDE 30 MG/1
30 CAPSULE, DELAYED RELEASE ORAL 2 TIMES DAILY
Qty: 60 CAPSULE | Refills: 3 | Status: SHIPPED | OUTPATIENT
Start: 2022-07-27 | End: 2022-09-08 | Stop reason: SDUPTHER

## 2022-07-27 RX ORDER — MIRTAZAPINE 15 MG/1
15 TABLET, FILM COATED ORAL NIGHTLY
Qty: 30 TABLET | Refills: 11 | Status: SHIPPED | OUTPATIENT
Start: 2022-07-27 | End: 2022-10-19 | Stop reason: SDUPTHER
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
## 2022-09-06 ENCOUNTER — LAB VISIT (OUTPATIENT)
Dept: LAB | Facility: HOSPITAL | Age: 72
End: 2022-09-06
Attending: INTERNAL MEDICINE
Payer: MEDICARE

## 2022-09-06 DIAGNOSIS — N18.2 CKD STAGE G2/A1, GFR 60-89 AND ALBUMIN CREATININE RATIO <30 MG/G: ICD-10-CM

## 2022-09-06 LAB
ALBUMIN SERPL-MCNC: 3.5 GM/DL (ref 3.4–4.8)
ALBUMIN/GLOB SERPL: 1.1 RATIO (ref 1.1–2)
ALP SERPL-CCNC: 74 UNIT/L (ref 40–150)
ALT SERPL-CCNC: 13 UNIT/L (ref 0–55)
AST SERPL-CCNC: 15 UNIT/L (ref 5–34)
BILIRUBIN DIRECT+TOT PNL SERPL-MCNC: 0.3 MG/DL
BUN SERPL-MCNC: 15.1 MG/DL (ref 9.8–20.1)
CALCIUM SERPL-MCNC: 8.7 MG/DL (ref 8.4–10.2)
CHLORIDE SERPL-SCNC: 106 MMOL/L (ref 98–107)
CO2 SERPL-SCNC: 31 MMOL/L (ref 23–31)
CREAT SERPL-MCNC: 1.14 MG/DL (ref 0.55–1.02)
GFR SERPLBLD CREATININE-BSD FMLA CKD-EPI: 52 MLS/MIN/1.73/M2
GLOBULIN SER-MCNC: 3.2 GM/DL (ref 2.4–3.5)
GLUCOSE SERPL-MCNC: 78 MG/DL (ref 82–115)
POTASSIUM SERPL-SCNC: 3.9 MMOL/L (ref 3.5–5.1)
PROT SERPL-MCNC: 6.7 GM/DL (ref 5.8–7.6)
SODIUM SERPL-SCNC: 144 MMOL/L (ref 136–145)

## 2022-09-06 PROCEDURE — 80053 COMPREHEN METABOLIC PANEL: CPT

## 2022-09-06 PROCEDURE — 36415 COLL VENOUS BLD VENIPUNCTURE: CPT

## 2022-09-08 ENCOUNTER — OFFICE VISIT (OUTPATIENT)
Dept: INTERNAL MEDICINE | Facility: CLINIC | Age: 72
End: 2022-09-08
Payer: MEDICARE

## 2022-09-08 VITALS
DIASTOLIC BLOOD PRESSURE: 81 MMHG | WEIGHT: 180.38 LBS | BODY MASS INDEX: 30.8 KG/M2 | TEMPERATURE: 98 F | HEART RATE: 75 BPM | SYSTOLIC BLOOD PRESSURE: 144 MMHG | OXYGEN SATURATION: 100 %

## 2022-09-08 DIAGNOSIS — F41.9 MILD ANXIETY: ICD-10-CM

## 2022-09-08 DIAGNOSIS — F32.A MILD DEPRESSION: Primary | ICD-10-CM

## 2022-09-08 DIAGNOSIS — G47.9 DIFFICULTY SLEEPING: ICD-10-CM

## 2022-09-08 PROCEDURE — 1159F PR MEDICATION LIST DOCUMENTED IN MEDICAL RECORD: ICD-10-PCS | Mod: CPTII,,, | Performed by: NURSE PRACTITIONER

## 2022-09-08 PROCEDURE — 3008F PR BODY MASS INDEX (BMI) DOCUMENTED: ICD-10-PCS | Mod: CPTII,,, | Performed by: NURSE PRACTITIONER

## 2022-09-08 PROCEDURE — 3061F NEG MICROALBUMINURIA REV: CPT | Mod: CPTII,,, | Performed by: NURSE PRACTITIONER

## 2022-09-08 PROCEDURE — 3079F PR MOST RECENT DIASTOLIC BLOOD PRESSURE 80-89 MM HG: ICD-10-PCS | Mod: CPTII,,, | Performed by: NURSE PRACTITIONER

## 2022-09-08 PROCEDURE — 3077F PR MOST RECENT SYSTOLIC BLOOD PRESSURE >= 140 MM HG: ICD-10-PCS | Mod: CPTII,,, | Performed by: NURSE PRACTITIONER

## 2022-09-08 PROCEDURE — 99213 PR OFFICE/OUTPT VISIT, EST, LEVL III, 20-29 MIN: ICD-10-PCS | Mod: S$PBB,,, | Performed by: NURSE PRACTITIONER

## 2022-09-08 PROCEDURE — 3079F DIAST BP 80-89 MM HG: CPT | Mod: CPTII,,, | Performed by: NURSE PRACTITIONER

## 2022-09-08 PROCEDURE — 3066F NEPHROPATHY DOC TX: CPT | Mod: CPTII,,, | Performed by: NURSE PRACTITIONER

## 2022-09-08 PROCEDURE — 4010F PR ACE/ARB THEARPY RXD/TAKEN: ICD-10-PCS | Mod: CPTII,,, | Performed by: NURSE PRACTITIONER

## 2022-09-08 PROCEDURE — 3061F PR NEG MICROALBUMINURIA RESULT DOCUMENTED/REVIEW: ICD-10-PCS | Mod: CPTII,,, | Performed by: NURSE PRACTITIONER

## 2022-09-08 PROCEDURE — 4010F ACE/ARB THERAPY RXD/TAKEN: CPT | Mod: CPTII,,, | Performed by: NURSE PRACTITIONER

## 2022-09-08 PROCEDURE — 1159F MED LIST DOCD IN RCRD: CPT | Mod: CPTII,,, | Performed by: NURSE PRACTITIONER

## 2022-09-08 PROCEDURE — 3008F BODY MASS INDEX DOCD: CPT | Mod: CPTII,,, | Performed by: NURSE PRACTITIONER

## 2022-09-08 PROCEDURE — 1160F RVW MEDS BY RX/DR IN RCRD: CPT | Mod: CPTII,,, | Performed by: NURSE PRACTITIONER

## 2022-09-08 PROCEDURE — 99213 OFFICE O/P EST LOW 20 MIN: CPT | Mod: S$PBB,,, | Performed by: NURSE PRACTITIONER

## 2022-09-08 PROCEDURE — 99213 OFFICE O/P EST LOW 20 MIN: CPT | Mod: PBBFAC | Performed by: NURSE PRACTITIONER

## 2022-09-08 PROCEDURE — 3077F SYST BP >= 140 MM HG: CPT | Mod: CPTII,,, | Performed by: NURSE PRACTITIONER

## 2022-09-08 PROCEDURE — 1160F PR REVIEW ALL MEDS BY PRESCRIBER/CLIN PHARMACIST DOCUMENTED: ICD-10-PCS | Mod: CPTII,,, | Performed by: NURSE PRACTITIONER

## 2022-09-08 PROCEDURE — 3066F PR DOCUMENTATION OF TREATMENT FOR NEPHROPATHY: ICD-10-PCS | Mod: CPTII,,, | Performed by: NURSE PRACTITIONER

## 2022-09-08 RX ORDER — DULOXETIN HYDROCHLORIDE 30 MG/1
30 CAPSULE, DELAYED RELEASE ORAL 2 TIMES DAILY
Qty: 60 CAPSULE | Refills: 5 | Status: SHIPPED | OUTPATIENT
Start: 2022-09-08 | End: 2022-10-19 | Stop reason: SDUPTHER
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
## 2022-09-09 ENCOUNTER — OFFICE VISIT (OUTPATIENT)
Dept: NEPHROLOGY | Facility: CLINIC | Age: 72
End: 2022-09-09
Payer: MEDICARE

## 2022-09-09 VITALS
HEIGHT: 64 IN | OXYGEN SATURATION: 99 % | HEART RATE: 70 BPM | RESPIRATION RATE: 20 BRPM | DIASTOLIC BLOOD PRESSURE: 82 MMHG | TEMPERATURE: 98 F | BODY MASS INDEX: 31.07 KG/M2 | WEIGHT: 182 LBS | SYSTOLIC BLOOD PRESSURE: 136 MMHG

## 2022-09-09 DIAGNOSIS — E11.9 TYPE 2 DIABETES MELLITUS WITHOUT COMPLICATION, WITH LONG-TERM CURRENT USE OF INSULIN: ICD-10-CM

## 2022-09-09 DIAGNOSIS — I10 HYPERTENSION, UNSPECIFIED TYPE: ICD-10-CM

## 2022-09-09 DIAGNOSIS — Z79.4 TYPE 2 DIABETES MELLITUS WITHOUT COMPLICATION, WITH LONG-TERM CURRENT USE OF INSULIN: ICD-10-CM

## 2022-09-09 DIAGNOSIS — N18.31 STAGE 3A CHRONIC KIDNEY DISEASE: Primary | ICD-10-CM

## 2022-09-09 DIAGNOSIS — E78.5 HYPERLIPIDEMIA, UNSPECIFIED HYPERLIPIDEMIA TYPE: ICD-10-CM

## 2022-09-09 DIAGNOSIS — E55.9 VITAMIN D DEFICIENCY, UNSPECIFIED: ICD-10-CM

## 2022-09-09 PROCEDURE — 99214 OFFICE O/P EST MOD 30 MIN: CPT | Mod: PBBFAC | Performed by: INTERNAL MEDICINE

## 2022-09-09 NOTE — PROGRESS NOTES
"[facility] NEPHROLOGY  OUTPATIENT OFFICE VISIT NOTE    SUBJECTIVE:      HPI: Ms. Briggs is a 71-year-old  female with past medical history of diabetes mellitus type 2 (diagnosed in her 20s), chronic kidney disease, hypertension, dyslipidemia, and obesity.  Presents for follow-up appointment in Nephrology Clinic today. She denies any new complaints today and states she has been doing well since her last visit in June 2022. Patient will have labs before next visit and will follow up with nephrology clinic in 5 months.      ROS:  CONSTITUTIONAL: No weight loss, subjective fever, chills, weakness or fatigue.  HEENT: Eyes: No visual loss, blurred vision, double vision or yellow sclerae. Ears, Nose, Throat: No hearing loss, sneezing, congestion, runny nose or sore throat.  SKIN: No rash or itching.  CARDIOVASCULAR: No chest pain, chest pressure or chest discomfort. No palpitations or edema.  RESPIRATORY: No shortness of breath, cough or sputum.  GASTROINTESTINAL: No anorexia, nausea, vomiting or diarrhea. No abdominal pain or blood.  GENITOURINARY: No dysuria, hematuria, or incontinence  NEUROLOGICAL: No headache, dizziness, syncope, paralysis, ataxia, numbness or tingling in the extremities. No change in bowel or bladder control.  MUSCULOSKELETAL: No muscle, back pain, joint pain or stiffness.  HEMATOLOGIC: No anemia, bleeding or bruising.  LYMPHATICS: No enlarged nodes.  PSYCHIATRIC: No history of depression or anxiety.  ENDOCRINOLOGIC: No reports of sweating, cold or heat intolerance. No polyuria or polydipsia.  ALLERGIES: No history of asthma, hives, eczema or rhinitis.       OBJECTIVE:     Vital signs:   /82 (BP Location: Right arm, Patient Position: Sitting, BP Method: Large (Automatic))   Pulse 70   Temp 98.2 °F (36.8 °C) (Oral)   Resp 20   Ht 5' 4" (1.626 m)   Wt 82.6 kg (182 lb)   SpO2 99%   BMI 31.24 kg/m²      Physical Examination:  General: obese w/o distress  HEENT: NC/AT; PERRLA; nasal " and oral mucosa moist and clear; no sinus tenderness; no thyromegaly  Neck: Full ROM; no lymphadenopathy  Pulm: CTA bilaterally, normal work of breathing  CV: S1, S2 w/o murmurs or gallops; no edema noted  GI: Soft with normal bowel sounds in all quadrants, no masses on palpation  MSK: Full ROM of all extremities and spine w/o limitation or discomfort  Derm: No rashes, abnormal bruising, or skin lesions  Neuro: AAOx4; CN II-XII intact; motor/sensory function intact  Psych: Cooperative; appropriate mood and affect       ASSESSMENT & PLAN:     CKD Stage IIIa (GFR 52)  - CMP from September 2022 shows a decrease in GFR to 52  - BUN/Cr 15.1/1.14  - ordered CMP, CBC, Vit D level, PTH intact, urine protein/creatinine for next visit     Continue:  -follow 2 g a day dietary sodium restriction  -controlled diabetes (goal A1c less than 7%)  -control high blood pressure (goal blood pressure is less than 130/80, please check blood pressure twice a week and bring blood pressure logs to office visit)  -exercise at least 30 minutes a day, 5 days a week  -maintain healthy weight  -decrease or stop alcohol use  -do not smoke  -stay well hydrated (drink water only, avoid juices, sweet tea, and sodas)  -ask about staying up-to-date on vaccinations (flu vaccine, pneumonia vaccine, hepatitis B vaccine)  -avoid excessive use of NSAIDs (ibuprofen, naproxen, Aleve, Advil, Toradol, Mobic), take Tylenol as needed for headache or mild pain  -take cholesterol lowering medications if prescribed (LDL goal less than 100)    Diabetes Mellitus   -last A1c 8.0 in May 2022  -continue on diabetic regiment   -educated on healthy diet and exercise     HTN  -/82mmHg  - Continue on Lisinopril and coreg  -educated on DASH diet and exercise     HLD  -continue to monitor, LDL at goal <70  -Last LDL 56    Obesity  -encouraged healthy low fat diet and exercise   -continue to monitor     Return to clinic in 5 months with labs    Cade [place],    [employer]  Internal Medicine, PGY-3

## 2022-09-21 ENCOUNTER — HISTORICAL (OUTPATIENT)
Dept: ADMINISTRATIVE | Facility: HOSPITAL | Age: 72
End: 2022-09-21
Payer: MEDICARE

## 2022-09-27 ENCOUNTER — OFFICE VISIT (OUTPATIENT)
Dept: GYNECOLOGY | Facility: CLINIC | Age: 72
End: 2022-09-27
Payer: MEDICARE

## 2022-09-27 VITALS
HEART RATE: 71 BPM | SYSTOLIC BLOOD PRESSURE: 150 MMHG | BODY MASS INDEX: 31.88 KG/M2 | RESPIRATION RATE: 18 BRPM | DIASTOLIC BLOOD PRESSURE: 71 MMHG | HEIGHT: 64 IN | WEIGHT: 186.75 LBS | OXYGEN SATURATION: 98 % | TEMPERATURE: 98 F

## 2022-09-27 DIAGNOSIS — Z12.31 VISIT FOR SCREENING MAMMOGRAM: ICD-10-CM

## 2022-09-27 DIAGNOSIS — Z01.419 ENCOUNTER FOR ANNUAL ROUTINE GYNECOLOGICAL EXAMINATION: Primary | ICD-10-CM

## 2022-09-27 DIAGNOSIS — L90.0 LICHEN SCLEROSUS ET ATROPHICUS: ICD-10-CM

## 2022-09-27 PROCEDURE — 3288F FALL RISK ASSESSMENT DOCD: CPT | Mod: CPTII,,, | Performed by: NURSE PRACTITIONER

## 2022-09-27 PROCEDURE — 1159F PR MEDICATION LIST DOCUMENTED IN MEDICAL RECORD: ICD-10-PCS | Mod: CPTII,,, | Performed by: NURSE PRACTITIONER

## 2022-09-27 PROCEDURE — 1101F PR PT FALLS ASSESS DOC 0-1 FALLS W/OUT INJ PAST YR: ICD-10-PCS | Mod: CPTII,,, | Performed by: NURSE PRACTITIONER

## 2022-09-27 PROCEDURE — 3077F PR MOST RECENT SYSTOLIC BLOOD PRESSURE >= 140 MM HG: ICD-10-PCS | Mod: CPTII,,, | Performed by: NURSE PRACTITIONER

## 2022-09-27 PROCEDURE — 3288F PR FALLS RISK ASSESSMENT DOCUMENTED: ICD-10-PCS | Mod: CPTII,,, | Performed by: NURSE PRACTITIONER

## 2022-09-27 PROCEDURE — 1159F MED LIST DOCD IN RCRD: CPT | Mod: CPTII,,, | Performed by: NURSE PRACTITIONER

## 2022-09-27 PROCEDURE — 3008F BODY MASS INDEX DOCD: CPT | Mod: CPTII,,, | Performed by: NURSE PRACTITIONER

## 2022-09-27 PROCEDURE — G0101 PR CA SCREEN;PELVIC/BREAST EXAM: ICD-10-PCS | Mod: S$PBB,,, | Performed by: NURSE PRACTITIONER

## 2022-09-27 PROCEDURE — 3078F DIAST BP <80 MM HG: CPT | Mod: CPTII,,, | Performed by: NURSE PRACTITIONER

## 2022-09-27 PROCEDURE — 1126F AMNT PAIN NOTED NONE PRSNT: CPT | Mod: CPTII,,, | Performed by: NURSE PRACTITIONER

## 2022-09-27 PROCEDURE — 4010F ACE/ARB THERAPY RXD/TAKEN: CPT | Mod: CPTII,,, | Performed by: NURSE PRACTITIONER

## 2022-09-27 PROCEDURE — 1101F PT FALLS ASSESS-DOCD LE1/YR: CPT | Mod: CPTII,,, | Performed by: NURSE PRACTITIONER

## 2022-09-27 PROCEDURE — 3066F NEPHROPATHY DOC TX: CPT | Mod: CPTII,,, | Performed by: NURSE PRACTITIONER

## 2022-09-27 PROCEDURE — 3077F SYST BP >= 140 MM HG: CPT | Mod: CPTII,,, | Performed by: NURSE PRACTITIONER

## 2022-09-27 PROCEDURE — 3078F PR MOST RECENT DIASTOLIC BLOOD PRESSURE < 80 MM HG: ICD-10-PCS | Mod: CPTII,,, | Performed by: NURSE PRACTITIONER

## 2022-09-27 PROCEDURE — 1126F PR PAIN SEVERITY QUANTIFIED, NO PAIN PRESENT: ICD-10-PCS | Mod: CPTII,,, | Performed by: NURSE PRACTITIONER

## 2022-09-27 PROCEDURE — 3066F PR DOCUMENTATION OF TREATMENT FOR NEPHROPATHY: ICD-10-PCS | Mod: CPTII,,, | Performed by: NURSE PRACTITIONER

## 2022-09-27 PROCEDURE — 3061F NEG MICROALBUMINURIA REV: CPT | Mod: CPTII,,, | Performed by: NURSE PRACTITIONER

## 2022-09-27 PROCEDURE — 3008F PR BODY MASS INDEX (BMI) DOCUMENTED: ICD-10-PCS | Mod: CPTII,,, | Performed by: NURSE PRACTITIONER

## 2022-09-27 PROCEDURE — 4010F PR ACE/ARB THEARPY RXD/TAKEN: ICD-10-PCS | Mod: CPTII,,, | Performed by: NURSE PRACTITIONER

## 2022-09-27 PROCEDURE — 3061F PR NEG MICROALBUMINURIA RESULT DOCUMENTED/REVIEW: ICD-10-PCS | Mod: CPTII,,, | Performed by: NURSE PRACTITIONER

## 2022-09-27 PROCEDURE — 99215 OFFICE O/P EST HI 40 MIN: CPT | Mod: PBBFAC | Performed by: NURSE PRACTITIONER

## 2022-09-27 PROCEDURE — G0101 CA SCREEN;PELVIC/BREAST EXAM: HCPCS | Mod: S$PBB,,, | Performed by: NURSE PRACTITIONER

## 2022-09-27 NOTE — PROGRESS NOTES
Subjective:       Patient ID: Marnie Briggs is a 71 y.o. female.    Chief Complaint:  Well Woman    History of Present Illness  Pt is  (1 stillbirth) here for annual GYN exam. Denies hx of abnormal pap smears. Hx of hysterectomy in  secondary to uterine fibroids. Denies any hot flashes. Denies any vaginal bleeding or discharge. Denies any abdominal or pelvic pain. Denies any urinary or breast complaints. Last MG-21-BIRADS 1. Denies any family hx of breast, uterine, or ovarian cancer. Pt has a hx of lichen sclerosus to the vaginal area and bilateral breast, diagnosed by dermatology with punch biopsy pathology report on 2015 was significant for lichen sclerosis. Denies itching, irritation, she is no longer using Clobetasol PRN. Denies fly hx of breast, ovarian, uterine or colon cancer. FIT neg in 2021. [id number]. BP-150/71 did not take BP medication this AM. No complaints today.    GYN & OB History  No LMP recorded. Patient has had a hysterectomy.     Review of patient's allergies indicates:  No Known Allergies  Past Medical History:   Diagnosis Date    CKD (chronic kidney disease) stage 2, GFR 60-89 ml/min     DM (diabetes mellitus)     HLD (hyperlipidemia)     HTN (hypertension)      OB History    Para Term  AB Living   6 4     1 4   SAB IAB Ectopic Multiple Live Births   1       4      # Outcome Date GA Lbr Blayne/2nd Weight Sex Delivery Anes PTL Lv   6       Vag-Spont   JORGE ALBERTO   5 SAB            4 Para         FD   3 Para      Vag-Spont   JORGE ALBERTO   2 Para      Vag-Spont   JORGE ALBERTO   1 Para      Vag-Spont   JORGE ALBERTO        Review of Systems  Review of Systems    Negative except for pertinent findings for positives per HPI     Objective:    Physical Exam    BP (!) 150/71 (BP Location: Left arm, Patient Position: Sitting, BP Method: Large (Automatic)) Comment: pt states she has not taken her BP medication yet this morning. Pt is asyptomatic.  Pulse 71   Temp 98.1 °F (36.7 °C)   Resp 18  "  Ht 5' 4" (1.626 m)   Wt 84.7 kg (186 lb 11.7 oz)   SpO2 98%   BMI 32.05 kg/m²   GENERAL: Alert and oriented x3. No apparent distress.  BREAST: No mass, tenderness or discharge. milan hypopigmented areas approx 6 o'clock.  PELVIC:   Labia: Milan labia majora with hypopigmented skin down to rectal area, agglutination of labia minora, no excoriation or lesions.  Vagina: pale, cystocele not past introitus.  Cervix: Surgically absent  Uterus: Surgically absent  Adnexa: Non-tender, no fullness.  INTEGUMENTARY: Warm and dry.  NEUROLOGIC: She is alert and oriented x3.   PSYCHIATRIC: Cooperative, appropriate mood and affect.    Assessment:       1. Encounter for annual routine gynecological examination    2. Visit for screening mammogram    3. Lichen sclerosus et atrophicus       Plan:   Marnie was seen today for well woman.    Diagnoses and all orders for this visit:    Encounter for annual routine gynecological examination    Visit for screening mammogram  -     Mammo Digital Screening Bilat; Future    Lichen sclerosus et atrophicus  Pelvic today, pap deferred d/t hysterectomy  Calcium 600 and Vitamin D 400 BID for bone health.  MMG ordered  Continue no scented products or hot baths, call if any itching or irritation, will resume Clobetasol.  Follow up in about 1 year (around 9/27/2023) for annual exam.      "

## 2022-10-19 ENCOUNTER — OFFICE VISIT (OUTPATIENT)
Dept: INTERNAL MEDICINE | Facility: CLINIC | Age: 72
End: 2022-10-19
Payer: MEDICARE

## 2022-10-19 DIAGNOSIS — F41.9 ANXIETY: ICD-10-CM

## 2022-10-19 DIAGNOSIS — F32.0 CURRENT MILD EPISODE OF MAJOR DEPRESSIVE DISORDER, UNSPECIFIED WHETHER RECURRENT: Primary | ICD-10-CM

## 2022-10-19 DIAGNOSIS — G47.00 INSOMNIA, UNSPECIFIED TYPE: ICD-10-CM

## 2022-10-19 PROCEDURE — 3066F NEPHROPATHY DOC TX: CPT | Mod: CPTII,95,, | Performed by: NURSE PRACTITIONER

## 2022-10-19 PROCEDURE — 3061F NEG MICROALBUMINURIA REV: CPT | Mod: CPTII,95,, | Performed by: NURSE PRACTITIONER

## 2022-10-19 PROCEDURE — 3061F PR NEG MICROALBUMINURIA RESULT DOCUMENTED/REVIEW: ICD-10-PCS | Mod: CPTII,95,, | Performed by: NURSE PRACTITIONER

## 2022-10-19 PROCEDURE — 3288F FALL RISK ASSESSMENT DOCD: CPT | Mod: CPTII,95,, | Performed by: NURSE PRACTITIONER

## 2022-10-19 PROCEDURE — 1101F PT FALLS ASSESS-DOCD LE1/YR: CPT | Mod: CPTII,95,, | Performed by: NURSE PRACTITIONER

## 2022-10-19 PROCEDURE — 99441 PR PHYSICIAN TELEPHONE EVALUATION 5-10 MIN: CPT | Mod: 95,,, | Performed by: NURSE PRACTITIONER

## 2022-10-19 PROCEDURE — 1101F PR PT FALLS ASSESS DOC 0-1 FALLS W/OUT INJ PAST YR: ICD-10-PCS | Mod: CPTII,95,, | Performed by: NURSE PRACTITIONER

## 2022-10-19 PROCEDURE — 1160F PR REVIEW ALL MEDS BY PRESCRIBER/CLIN PHARMACIST DOCUMENTED: ICD-10-PCS | Mod: CPTII,95,, | Performed by: NURSE PRACTITIONER

## 2022-10-19 PROCEDURE — 99441 PR PHYSICIAN TELEPHONE EVALUATION 5-10 MIN: ICD-10-PCS | Mod: 95,,, | Performed by: NURSE PRACTITIONER

## 2022-10-19 PROCEDURE — 4010F PR ACE/ARB THEARPY RXD/TAKEN: ICD-10-PCS | Mod: CPTII,95,, | Performed by: NURSE PRACTITIONER

## 2022-10-19 PROCEDURE — 3066F PR DOCUMENTATION OF TREATMENT FOR NEPHROPATHY: ICD-10-PCS | Mod: CPTII,95,, | Performed by: NURSE PRACTITIONER

## 2022-10-19 PROCEDURE — 1160F RVW MEDS BY RX/DR IN RCRD: CPT | Mod: CPTII,95,, | Performed by: NURSE PRACTITIONER

## 2022-10-19 PROCEDURE — 1159F MED LIST DOCD IN RCRD: CPT | Mod: CPTII,95,, | Performed by: NURSE PRACTITIONER

## 2022-10-19 PROCEDURE — 4010F ACE/ARB THERAPY RXD/TAKEN: CPT | Mod: CPTII,95,, | Performed by: NURSE PRACTITIONER

## 2022-10-19 PROCEDURE — 3288F PR FALLS RISK ASSESSMENT DOCUMENTED: ICD-10-PCS | Mod: CPTII,95,, | Performed by: NURSE PRACTITIONER

## 2022-10-19 PROCEDURE — 1159F PR MEDICATION LIST DOCUMENTED IN MEDICAL RECORD: ICD-10-PCS | Mod: CPTII,95,, | Performed by: NURSE PRACTITIONER

## 2022-10-19 RX ORDER — MIRTAZAPINE 15 MG/1
15 TABLET, FILM COATED ORAL NIGHTLY
Qty: 30 TABLET | Refills: 5 | Status: SHIPPED | OUTPATIENT
Start: 2022-10-19 | End: 2023-02-16 | Stop reason: SDUPTHER

## 2022-10-19 RX ORDER — DULOXETIN HYDROCHLORIDE 30 MG/1
30 CAPSULE, DELAYED RELEASE ORAL 2 TIMES DAILY
Qty: 60 CAPSULE | Refills: 5 | Status: SHIPPED | OUTPATIENT
Start: 2022-10-19 | End: 2023-02-16 | Stop reason: SDUPTHER
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
## 2022-11-04 RX ORDER — INSULIN GLARGINE 100 [IU]/ML
30 INJECTION, SOLUTION SUBCUTANEOUS NIGHTLY
Status: CANCELLED | OUTPATIENT
Start: 2022-11-04

## 2022-11-11 DIAGNOSIS — Z79.4 TYPE 2 DIABETES MELLITUS WITHOUT COMPLICATION, WITH LONG-TERM CURRENT USE OF INSULIN: Primary | ICD-10-CM

## 2022-11-11 DIAGNOSIS — E11.9 TYPE 2 DIABETES MELLITUS WITHOUT COMPLICATION, WITH LONG-TERM CURRENT USE OF INSULIN: Primary | ICD-10-CM

## 2022-11-15 RX ORDER — INSULIN GLARGINE 100 [IU]/ML
30 INJECTION, SOLUTION SUBCUTANEOUS NIGHTLY
Qty: 9 ML | Refills: 0 | Status: SHIPPED | OUTPATIENT
Start: 2022-11-15 | End: 2022-11-18

## 2022-11-18 ENCOUNTER — CLINICAL SUPPORT (OUTPATIENT)
Dept: INTERNAL MEDICINE | Facility: CLINIC | Age: 72
End: 2022-11-18
Attending: NURSE PRACTITIONER
Payer: MEDICARE

## 2022-11-18 ENCOUNTER — OFFICE VISIT (OUTPATIENT)
Dept: INTERNAL MEDICINE | Facility: CLINIC | Age: 72
End: 2022-11-18
Payer: MEDICARE

## 2022-11-18 VITALS
RESPIRATION RATE: 18 BRPM | TEMPERATURE: 98 F | SYSTOLIC BLOOD PRESSURE: 137 MMHG | HEART RATE: 67 BPM | HEIGHT: 64 IN | DIASTOLIC BLOOD PRESSURE: 84 MMHG | WEIGHT: 184.19 LBS | BODY MASS INDEX: 31.45 KG/M2

## 2022-11-18 DIAGNOSIS — Z12.11 ENCOUNTER FOR COLORECTAL CANCER SCREENING: ICD-10-CM

## 2022-11-18 DIAGNOSIS — Z12.12 ENCOUNTER FOR COLORECTAL CANCER SCREENING: ICD-10-CM

## 2022-11-18 DIAGNOSIS — E78.00 HYPERCHOLESTEROLEMIA: Chronic | ICD-10-CM

## 2022-11-18 DIAGNOSIS — Z13.5 DIABETIC RETINOPATHY SCREENING: ICD-10-CM

## 2022-11-18 DIAGNOSIS — E11.22 STAGE 2 CHRONIC KIDNEY DISEASE DUE TO TYPE 2 DIABETES MELLITUS: Chronic | ICD-10-CM

## 2022-11-18 DIAGNOSIS — Z78.0 POST-MENOPAUSE: ICD-10-CM

## 2022-11-18 DIAGNOSIS — E66.09 CLASS 1 OBESITY DUE TO EXCESS CALORIES WITH SERIOUS COMORBIDITY AND BODY MASS INDEX (BMI) OF 31.0 TO 31.9 IN ADULT: Chronic | ICD-10-CM

## 2022-11-18 DIAGNOSIS — G89.29 OTHER CHRONIC PAIN: ICD-10-CM

## 2022-11-18 DIAGNOSIS — K21.9 GASTROESOPHAGEAL REFLUX DISEASE, UNSPECIFIED WHETHER ESOPHAGITIS PRESENT: Chronic | ICD-10-CM

## 2022-11-18 DIAGNOSIS — I10 ESSENTIAL HYPERTENSION: Chronic | ICD-10-CM

## 2022-11-18 DIAGNOSIS — M54.41 ACUTE BACK PAIN WITH SCIATICA, RIGHT: ICD-10-CM

## 2022-11-18 DIAGNOSIS — Z23 NEEDS FLU SHOT: ICD-10-CM

## 2022-11-18 DIAGNOSIS — Z13.5 DIABETIC RETINOPATHY SCREENING: Primary | ICD-10-CM

## 2022-11-18 DIAGNOSIS — E11.22 TYPE 2 DIABETES MELLITUS WITH STAGE 2 CHRONIC KIDNEY DISEASE, WITHOUT LONG-TERM CURRENT USE OF INSULIN: Primary | Chronic | ICD-10-CM

## 2022-11-18 DIAGNOSIS — N18.2 TYPE 2 DIABETES MELLITUS WITH STAGE 2 CHRONIC KIDNEY DISEASE, WITHOUT LONG-TERM CURRENT USE OF INSULIN: Primary | Chronic | ICD-10-CM

## 2022-11-18 DIAGNOSIS — N18.2 STAGE 2 CHRONIC KIDNEY DISEASE DUE TO TYPE 2 DIABETES MELLITUS: Chronic | ICD-10-CM

## 2022-11-18 DIAGNOSIS — F32.0 CURRENT MILD EPISODE OF MAJOR DEPRESSIVE DISORDER, UNSPECIFIED WHETHER RECURRENT: Chronic | ICD-10-CM

## 2022-11-18 LAB — HBA1C MFR BLD: 9.2 %

## 2022-11-18 PROCEDURE — 1160F RVW MEDS BY RX/DR IN RCRD: CPT | Mod: CPTII,,, | Performed by: NURSE PRACTITIONER

## 2022-11-18 PROCEDURE — 3288F PR FALLS RISK ASSESSMENT DOCUMENTED: ICD-10-PCS | Mod: CPTII,,, | Performed by: NURSE PRACTITIONER

## 2022-11-18 PROCEDURE — 99215 OFFICE O/P EST HI 40 MIN: CPT | Mod: PBBFAC | Performed by: NURSE PRACTITIONER

## 2022-11-18 PROCEDURE — 3061F NEG MICROALBUMINURIA REV: CPT | Mod: CPTII,,, | Performed by: NURSE PRACTITIONER

## 2022-11-18 PROCEDURE — 3046F HEMOGLOBIN A1C LEVEL >9.0%: CPT | Mod: CPTII,,, | Performed by: NURSE PRACTITIONER

## 2022-11-18 PROCEDURE — 3061F PR NEG MICROALBUMINURIA RESULT DOCUMENTED/REVIEW: ICD-10-PCS | Mod: CPTII,,, | Performed by: NURSE PRACTITIONER

## 2022-11-18 PROCEDURE — 4010F PR ACE/ARB THEARPY RXD/TAKEN: ICD-10-PCS | Mod: CPTII,,, | Performed by: NURSE PRACTITIONER

## 2022-11-18 PROCEDURE — 1101F PT FALLS ASSESS-DOCD LE1/YR: CPT | Mod: CPTII,,, | Performed by: NURSE PRACTITIONER

## 2022-11-18 PROCEDURE — 3066F NEPHROPATHY DOC TX: CPT | Mod: CPTII,,, | Performed by: NURSE PRACTITIONER

## 2022-11-18 PROCEDURE — 2025F PR 7 STANDARD FLD STEREO RETINAL PHOTOS W/O EVID OF RETINOPATHY W/INTERPT BY OPHTH/OPT: ICD-10-PCS | Mod: CPTII,,, | Performed by: NURSE PRACTITIONER

## 2022-11-18 PROCEDURE — 83036 HEMOGLOBIN GLYCOSYLATED A1C: CPT | Mod: PBBFAC | Performed by: NURSE PRACTITIONER

## 2022-11-18 PROCEDURE — 99214 OFFICE O/P EST MOD 30 MIN: CPT | Mod: 25,S$PBB,, | Performed by: NURSE PRACTITIONER

## 2022-11-18 PROCEDURE — 1101F PR PT FALLS ASSESS DOC 0-1 FALLS W/OUT INJ PAST YR: ICD-10-PCS | Mod: CPTII,,, | Performed by: NURSE PRACTITIONER

## 2022-11-18 PROCEDURE — 3075F SYST BP GE 130 - 139MM HG: CPT | Mod: CPTII,,, | Performed by: NURSE PRACTITIONER

## 2022-11-18 PROCEDURE — 3066F PR DOCUMENTATION OF TREATMENT FOR NEPHROPATHY: ICD-10-PCS | Mod: CPTII,,, | Performed by: NURSE PRACTITIONER

## 2022-11-18 PROCEDURE — 1125F PR PAIN SEVERITY QUANTIFIED, PAIN PRESENT: ICD-10-PCS | Mod: CPTII,,, | Performed by: NURSE PRACTITIONER

## 2022-11-18 PROCEDURE — 1125F AMNT PAIN NOTED PAIN PRSNT: CPT | Mod: CPTII,,, | Performed by: NURSE PRACTITIONER

## 2022-11-18 PROCEDURE — 3075F PR MOST RECENT SYSTOLIC BLOOD PRESS GE 130-139MM HG: ICD-10-PCS | Mod: CPTII,,, | Performed by: NURSE PRACTITIONER

## 2022-11-18 PROCEDURE — 3288F FALL RISK ASSESSMENT DOCD: CPT | Mod: CPTII,,, | Performed by: NURSE PRACTITIONER

## 2022-11-18 PROCEDURE — G0008 ADMIN INFLUENZA VIRUS VAC: HCPCS | Mod: PBBFAC

## 2022-11-18 PROCEDURE — 1160F PR REVIEW ALL MEDS BY PRESCRIBER/CLIN PHARMACIST DOCUMENTED: ICD-10-PCS | Mod: CPTII,,, | Performed by: NURSE PRACTITIONER

## 2022-11-18 PROCEDURE — 3008F BODY MASS INDEX DOCD: CPT | Mod: CPTII,,, | Performed by: NURSE PRACTITIONER

## 2022-11-18 PROCEDURE — 1159F MED LIST DOCD IN RCRD: CPT | Mod: CPTII,,, | Performed by: NURSE PRACTITIONER

## 2022-11-18 PROCEDURE — 3008F PR BODY MASS INDEX (BMI) DOCUMENTED: ICD-10-PCS | Mod: CPTII,,, | Performed by: NURSE PRACTITIONER

## 2022-11-18 PROCEDURE — 99214 PR OFFICE/OUTPT VISIT, EST, LEVL IV, 30-39 MIN: ICD-10-PCS | Mod: 25,S$PBB,, | Performed by: NURSE PRACTITIONER

## 2022-11-18 PROCEDURE — 3046F PR MOST RECENT HEMOGLOBIN A1C LEVEL > 9.0%: ICD-10-PCS | Mod: CPTII,,, | Performed by: NURSE PRACTITIONER

## 2022-11-18 PROCEDURE — 2025F 7 FLD RTA PHOTO W/O RTNOPTHY: CPT | Mod: CPTII,,, | Performed by: NURSE PRACTITIONER

## 2022-11-18 PROCEDURE — 4010F ACE/ARB THERAPY RXD/TAKEN: CPT | Mod: CPTII,,, | Performed by: NURSE PRACTITIONER

## 2022-11-18 PROCEDURE — 3079F PR MOST RECENT DIASTOLIC BLOOD PRESSURE 80-89 MM HG: ICD-10-PCS | Mod: CPTII,,, | Performed by: NURSE PRACTITIONER

## 2022-11-18 PROCEDURE — 1159F PR MEDICATION LIST DOCUMENTED IN MEDICAL RECORD: ICD-10-PCS | Mod: CPTII,,, | Performed by: NURSE PRACTITIONER

## 2022-11-18 PROCEDURE — 3079F DIAST BP 80-89 MM HG: CPT | Mod: CPTII,,, | Performed by: NURSE PRACTITIONER

## 2022-11-18 RX ORDER — CYCLOBENZAPRINE HCL 10 MG
10 TABLET ORAL NIGHTLY
Qty: 90 TABLET | Refills: 1 | Status: SHIPPED | OUTPATIENT
Start: 2022-11-18 | End: 2023-02-24 | Stop reason: SDUPTHER

## 2022-11-18 RX ORDER — INSULIN GLARGINE 100 [IU]/ML
30 INJECTION, SOLUTION SUBCUTANEOUS DAILY
Qty: 27 ML | Refills: 2 | Status: SHIPPED | OUTPATIENT
Start: 2022-11-18 | End: 2023-01-05 | Stop reason: SDUPTHER

## 2022-11-18 RX ORDER — INSULIN GLARGINE 100 [IU]/ML
30 INJECTION, SOLUTION SUBCUTANEOUS DAILY
COMMUNITY
Start: 2022-11-15 | End: 2022-11-18

## 2022-11-18 RX ORDER — GABAPENTIN 600 MG/1
600 TABLET ORAL 2 TIMES DAILY
Qty: 180 TABLET | Refills: 1 | Status: SHIPPED | OUTPATIENT
Start: 2022-11-18 | End: 2023-02-24 | Stop reason: SDUPTHER

## 2022-11-18 RX ORDER — METFORMIN HYDROCHLORIDE 1000 MG/1
1000 TABLET ORAL 2 TIMES DAILY WITH MEALS
Qty: 180 TABLET | Refills: 1 | Status: SHIPPED | OUTPATIENT
Start: 2022-11-18 | End: 2023-01-30 | Stop reason: SDUPTHER

## 2022-11-18 RX ORDER — CYCLOBENZAPRINE HCL 10 MG
1 TABLET ORAL
COMMUNITY
Start: 2022-10-28 | End: 2022-11-18 | Stop reason: SDUPTHER

## 2022-11-18 RX ORDER — PIOGLITAZONEHYDROCHLORIDE 30 MG/1
30 TABLET ORAL DAILY
Qty: 90 TABLET | Refills: 1 | Status: SHIPPED | OUTPATIENT
Start: 2022-11-18 | End: 2023-02-24

## 2022-11-18 RX ORDER — OMEPRAZOLE 40 MG/1
40 CAPSULE, DELAYED RELEASE ORAL DAILY
Qty: 90 CAPSULE | Refills: 1 | Status: SHIPPED | OUTPATIENT
Start: 2022-11-18 | End: 2023-02-24 | Stop reason: SDUPTHER

## 2022-11-18 NOTE — PROGRESS NOTES
Subjective:       Patient ID: Marnie Briggs is a 72 y.o. female.    Chief Complaint: Follow-up (Request flu vaccine)    Patient has diagnosis of HTN, HLD, DM2, CKD. Patient seen in clinic today for follow up on DM2. Patient last seen in clinic on 2022. Patient did not reschedule appointment with Endocrinology Clinic. POC HgbA1c 9.2. Patient currently on Lantus 30 units daily, Metformin 500 mg bid. Patient denies any acute complaints. States she takes her medications as prescribed. Patient denies any acute complaints. Patient has chronic back pain. Patient states she has been to physical therapy but it did not help and she doesn't want to go back. Patient states she has been to pain management but that didn't help either. Patient refuses referral to Orthopedic/Neurosurgery. Patient currently on Cymbalta, Gabapentin and states helping.     Patient followed by Mental Health Provider, Angie Mims NP. Last appointment on 10/19/2022. On her last visit, patient was doing well on the Cymbalta and Remeron. No med changes were made. Today, patient states that she is doing well. She is still sleeping well at night with the Remeron and her mood is OK. Will continue meds and FU in 3 months. Patient has follow up appointment scheduled for 2023.     Patient followed by GYN clinic. Last appointment on 2022. Pt is  (1 stillbirth) here for annual GYN exam. Denies hx of abnormal pap smears. Hx of hysterectomy in  secondary to uterine fibroids. Denies any hot flashes. Denies any vaginal bleeding or discharge. Denies any abdominal or pelvic pain. Denies any urinary or breast complaints. Last MG-21-BIRADS 1. Denies any family hx of breast, uterine, or ovarian cancer. Pt has a hx of lichen sclerosus to the vaginal area and bilateral breast, diagnosed by dermatology with punch biopsy pathology report on 2015 was significant for lichen sclerosis. Denies itching, irritation, she is no longer using  Clobetasol PRN. Denies fly hx of breast, ovarian, uterine or colon cancer. FIT neg in 11/2021. [id number]. BP-150/71 did not take BP medication this AM. No complaints today. Lichen sclerosus et atrophicus: Pelvic today, pap deferred d/t hysterectomy. Calcium 600 and Vitamin D 400 BID for bone health. MMG ordered. Continue no scented products or hot baths, call if any itching or irritation, will resume Clobetasol. Follow up in about 1 year (around 9/27/2023) for annual exam. Patient has follow up appointment scheduled for 09/28/2023.     Patient followed by Nephrology Clinic. Last appointment on 09/09/2022. Ms. Briggs is a 71-year-old  female with past medical history of diabetes mellitus type 2 (diagnosed in her 20s), chronic kidney disease, hypertension, dyslipidemia, and obesity.  Presents for follow-up appointment in Nephrology Clinic today. She denies any new complaints today and states she has been doing well since her last visit in June 2022. Patient will have labs before next visit and will follow up with nephrology clinic in 5 months. CKD Stage IIIa (GFR 52): CMP from September 2022 shows a decrease in GFR to 52; BUN/Cr 15.1/1.14; ordered CMP, CBC, Vit D level, PTH intact, urine protein/creatinine for next visit. Diabetes Mellitus: last A1c 8.0 in May 2022; continue on diabetic regiment; educated on healthy diet and exercise. HTN: /82mmHg; Continue on Lisinopril and coreg; educated on DASH diet and exercise. HLD: continue to monitor, LDL at goal <70; Last LDL 56. Return to clinic in 5 months with labs. Patient has follow up appointment scheduled for 02/10/2023.      Patient followed by Endocrinology Clinic for DM2. Last appointment on 05/03/2021. History of uncontrolled type 2 diabetes with neuropathy, hypertension, hyperlipidemia, vitamin D deficiency.  Type 2 diabetes current A1c 6.7 previous 6.6, 10.2 and 10.4.  Patient has had improved A1c and type 2 diabetes is now controlled.   CBGs patient checks 2-3 times per day ranges  pt changes her inuslin to 20-30 units leading to hypoglycemia, reeducated on not changing basal insulin dosing.  Patient has occasional hypoglycemia in the 70s is when patient gives her higher doses of basal insulin at night. Hypertension at goal today, hyperlipidemia Total 111 HDL 38 triglycerides 115 LDL 50 at goal per guidelines patient is currently on a statin.  Vitamin D deficiency current level 28.4 patient is to start over-the-counter vitamin D3 2000 IUs daily.  Hashimoto's recently diagnosed TSH 5.7204 Free T4 0.86 On previous labs on 12/21/2020 TPO positive TPO quantitative 1130.  Patient denies fatigue or weight gain.  We will continue to monitor repeat on follow-up visit. Medications: continue Metformin 500 mg BID, Actos 30mg. Change Lantus to 25 units daily. Patient was to follow up in 6 months but missed appointment.       Mammogram: 11/22/2021, negative, scheduled for 11/23/2022  Pap: Hysterectomy  FIT: 11/29/2021, negative, Cologuard ordered 11/18/2022  Diabetic Eye Exam: 11/18/2022  Diabetic Foot Exam: 11/18/2022  Bone Density: 06/24/2020, normal, ordered 11/18/2022  Medicare Wellness: ordered 11/17/2022    Review of Systems   Constitutional: Negative.    HENT: Negative.     Eyes: Negative.    Respiratory: Negative.     Cardiovascular: Negative.    Gastrointestinal: Negative.    Endocrine: Negative.    Genitourinary: Negative.    Musculoskeletal: Negative.    Integumentary:  Negative.   Allergic/Immunologic: Negative.    Neurological: Negative.    Hematological: Negative.    Psychiatric/Behavioral: Negative.         Objective:      Physical Exam  Vitals reviewed.   Constitutional:       Appearance: Normal appearance.   HENT:      Head: Normocephalic and atraumatic.      Mouth/Throat:      Mouth: Mucous membranes are moist.      Pharynx: Oropharynx is clear.   Eyes:      Extraocular Movements: Extraocular movements intact.      Conjunctiva/sclera:  Conjunctivae normal.      Pupils: Pupils are equal, round, and reactive to light.   Cardiovascular:      Rate and Rhythm: Normal rate and regular rhythm.      Pulses:           Dorsalis pedis pulses are 2+ on the right side and 2+ on the left side.      Heart sounds: Normal heart sounds.   Pulmonary:      Effort: Pulmonary effort is normal.      Breath sounds: Normal breath sounds.   Abdominal:      General: Bowel sounds are normal.   Musculoskeletal:         General: Normal range of motion.      Cervical back: Normal range of motion.   Feet:      Right foot:      Protective Sensation: 9 sites tested.  8 sites sensed.      Skin integrity: Skin integrity normal.      Toenail Condition: Right toenails are normal.      Left foot:      Protective Sensation: 9 sites tested.  9 sites sensed.      Skin integrity: Skin integrity normal.      Toenail Condition: Left toenails are normal.   Skin:     General: Skin is warm and dry.   Neurological:      Mental Status: She is alert and oriented to person, place, and time.   Psychiatric:         Mood and Affect: Mood normal.         Behavior: Behavior normal.       Assessment:       Problem List Items Addressed This Visit          Psychiatric    Depression (Chronic)     Followed by Mental health Provider  Continue Duloxetine 20 mg bid, Remeron 15 mg Qhs            Cardiac/Vascular    Essential hypertension (Chronic)     B/P: 137/84  EK2022  Continue Coreg 12.5 mg bid, Lisinopril 10 mg daily  DASH diet  Encouraged home blood pressure monitoring         Hypercholesterolemia (Chronic)     Continue Atorvastatin 80 mg daily  Weight loss encouraged  Low fat/high fiber diet  Increase physical activity   Latest Reference Range & Units 22 09:41   Cholesterol <=200 mg/dL 110   HDL 35 - 60 mg/dL 34 (L)   LDL Cholesterol External 50.00 - 140.00 mg/dL 56.00   Total Cholesterol/HDL Ratio 0 - 5  3   Triglycerides 37 - 140 mg/dL 98   Very Low Density Lipoprotein  20             Relevant Orders    Lipid Panel       Renal/    Stage 2 chronic kidney disease due to type 2 diabetes mellitus (Chronic)     Patient followed by Nephrology Clinic  BUN/Cr: 15.1/1.14  GFR: 52         Relevant Medications    pioglitazone (ACTOS) 30 MG tablet    metFORMIN (GLUCOPHAGE) 1000 MG tablet    LANTUS SOLOSTAR U-100 INSULIN glargine 100 units/mL SubQ pen       ID    Needs flu shot     Flu vaccine given         Relevant Orders    Influenza (FLUAD) - Quadrivalent (Adjuvanted) *Preferred* (65+) (PF) (Completed)       Endocrine    Type 2 diabetes mellitus with stage 2 chronic kidney disease, without long-term current use of insulin - Primary (Chronic)     Continue Lantus 30 units daily  Increase to Metformin 1,000 mg bid  Encouraged home CBG monitoring.  Hypoglycemic episodes: denies  BMI: 31.60  HgbA1c: 9.2  UA Creatinine: 55.6  UA Microalbumin: <5  On Atorvastatin  Weight Loss Encouraged  ADA Diet         Relevant Medications    pioglitazone (ACTOS) 30 MG tablet    metFORMIN (GLUCOPHAGE) 1000 MG tablet    LANTUS SOLOSTAR U-100 INSULIN glargine 100 units/mL SubQ pen    Other Relevant Orders    POCT HEMOGLOBIN A1C (Completed)    CBC Auto Differential    Comprehensive Metabolic Panel    Hemoglobin A1C    Class 1 obesity with serious comorbidity and body mass index (BMI) of 31.0 to 31.9 in adult (Chronic)     BMI: 31.60  TSH: 2.6  Vitamin D level: ordered  HgbA1c: 9.2  Weight Loss Encouraged  Increase Physical Activity         Relevant Orders    Vitamin D       GI    Gastroesophageal reflux disease (Chronic)     Continue Omeprazole 40 mg daily  Avoid spicy foods  Remain in an upright position for at least 30 minutes after consuming meals          Other Visit Diagnoses       Diabetic retinopathy screening        Relevant Orders    Diabetic Eye Screening Photo (Completed)    Acute back pain with sciatica, right        Relevant Medications    gabapentin (NEURONTIN) 600 MG tablet    Other chronic pain        Relevant  Medications    gabapentin (NEURONTIN) 600 MG tablet    Encounter for colorectal cancer screening        Relevant Orders    Cologuard Screening (Multitarget Stool DNA)    Post-menopause        Relevant Orders    DXA Bone Density Spine And Hip            Plan:    Patient to follow up in 3 months with labs to be done prior to appointment to reassess DM2.

## 2022-11-18 NOTE — PROGRESS NOTES
Marnie Briggs is a 72 y.o. female here for a diabetic eye screening with non-dilated fundus photos per LATOYA Carias.    Patient cooperative?: Yes  Small pupils?: No  Last eye exam: unknown    For exam results, see Encounter Report.

## 2022-11-19 NOTE — ASSESSMENT & PLAN NOTE
BMI: 31.60  TSH: 2.6  Vitamin D level: ordered  HgbA1c: 9.2  Weight Loss Encouraged  Increase Physical Activity

## 2022-11-19 NOTE — ASSESSMENT & PLAN NOTE
Continue Omeprazole 40 mg daily  Avoid spicy foods  Remain in an upright position for at least 30 minutes after consuming meals

## 2022-11-19 NOTE — ASSESSMENT & PLAN NOTE
Continue Atorvastatin 80 mg daily  Weight loss encouraged  Low fat/high fiber diet  Increase physical activity   Latest Reference Range & Units 05/19/22 09:41   Cholesterol <=200 mg/dL 110   HDL 35 - 60 mg/dL 34 (L)   LDL Cholesterol External 50.00 - 140.00 mg/dL 56.00   Total Cholesterol/HDL Ratio 0 - 5  3   Triglycerides 37 - 140 mg/dL 98   Very Low Density Lipoprotein  20

## 2022-11-19 NOTE — ASSESSMENT & PLAN NOTE
Continue Lantus 30 units daily  Increase to Metformin 1,000 mg bid  Encouraged home CBG monitoring.  Hypoglycemic episodes: denies  BMI: 31.60  HgbA1c: 9.2  UA Creatinine: 55.6  UA Microalbumin: <5  On Atorvastatin  Weight Loss Encouraged  ADA Diet

## 2022-11-19 NOTE — ASSESSMENT & PLAN NOTE
B/P: 137/84  EK2022  Continue Coreg 12.5 mg bid, Lisinopril 10 mg daily  DASH diet  Encouraged home blood pressure monitoring

## 2022-11-21 ENCOUNTER — OFFICE VISIT (OUTPATIENT)
Dept: OPHTHALMOLOGY | Facility: CLINIC | Age: 72
End: 2022-11-21
Payer: MEDICARE

## 2022-11-21 VITALS — BODY MASS INDEX: 31.47 KG/M2 | WEIGHT: 184.31 LBS | HEIGHT: 64 IN

## 2022-11-21 DIAGNOSIS — H33.102 RETINOSCHISIS OF LEFT EYE: ICD-10-CM

## 2022-11-21 DIAGNOSIS — H35.372 EPIRETINAL MEMBRANE (ERM) OF LEFT EYE: Primary | ICD-10-CM

## 2022-11-21 DIAGNOSIS — Z96.1 PSEUDOPHAKIA, BOTH EYES: ICD-10-CM

## 2022-11-21 DIAGNOSIS — E11.9 TYPE 2 DIABETES MELLITUS WITHOUT RETINOPATHY: ICD-10-CM

## 2022-11-21 PROCEDURE — 92134 CPTRZ OPH DX IMG PST SGM RTA: CPT | Mod: PBBFAC,PO | Performed by: STUDENT IN AN ORGANIZED HEALTH CARE EDUCATION/TRAINING PROGRAM

## 2022-11-21 PROCEDURE — 99213 OFFICE O/P EST LOW 20 MIN: CPT | Mod: PBBFAC,PO | Performed by: STUDENT IN AN ORGANIZED HEALTH CARE EDUCATION/TRAINING PROGRAM

## 2022-11-21 PROCEDURE — 92134 CPTRZ OPH DX IMG PST SGM RTA: CPT | Mod: PBBFAC,PO | Performed by: OPHTHALMOLOGY

## 2022-11-21 NOTE — PROGRESS NOTES
OCT Mac 11/19/21  OD: 219; excavated foveal contour, ERM  OS: 360; ERM with schisis of inner retinal layers and distortion of foveal contour    OCT mac 11/21/22  OD: , intact foveal contour that appears excavated as previously noted, but no IRF/SRF  OS: , temporal ERM parafoveally with schisis of foveal contour and parafoveal IRF    Assessment /Plan     For exam results, see Encounter Report.    Early dry stage nonexudative age-related macular degeneration of both eyes    Type 2 diabetes mellitus without retinopathy    Pseudophakia, both eyes      1. T2DM without ophthalmologic manifestations  - Last A1c as below:  Hemoglobin A1C   Date Value Ref Range Status   11/18/2022 9.2 % Final     Hemoglobin A1c   Date Value Ref Range Status   05/19/2022 8.0 (H) <=7.0 % Final   11/22/2021 6.6 <<=7.0 % Final   06/04/2021 6.6 <<=7.0 % Final   - No signs of retinopathy on exam  - No signs of DME on OCT mac  - Annual fundus photo obtained 11/21/22  - Encouraged good BS/BP/Cholesterol control, f/u with PCP regularly  - Monitor with annual DFE (next due 11/2023)    2. ERM with foveoschisis OS  - seen with Dr. Martin 11/19/21: believes this is ERM with foveoschisis rather than a partial thickness hole. No treatment indicated for now as vision good and patient having no symptoms  - Patient happy with vision, denies new visual problems including metamorphopsias    3. Pseudophakia OU  4. PCO, non-visually significant, right eye  - NVS, patient happy with vision    RTC 6 months for DFE OU, OCT mac OU

## 2022-11-23 ENCOUNTER — HOSPITAL ENCOUNTER (OUTPATIENT)
Dept: RADIOLOGY | Facility: HOSPITAL | Age: 72
Discharge: HOME | End: 2022-11-23
Attending: NURSE PRACTITIONER
Payer: MEDICARE

## 2022-11-23 DIAGNOSIS — Z12.31 VISIT FOR SCREENING MAMMOGRAM: ICD-10-CM

## 2022-11-23 PROCEDURE — 77063 BREAST TOMOSYNTHESIS BI: CPT | Mod: TC

## 2022-11-23 PROCEDURE — 77067 SCR MAMMO BI INCL CAD: CPT | Mod: 26,,, | Performed by: RADIOLOGY

## 2022-11-23 PROCEDURE — 77063 MAMMO DIGITAL SCREENING BILAT WITH TOMO: ICD-10-PCS | Mod: 26,,, | Performed by: RADIOLOGY

## 2022-11-23 PROCEDURE — 77063 BREAST TOMOSYNTHESIS BI: CPT | Mod: 26,,, | Performed by: RADIOLOGY

## 2022-11-23 PROCEDURE — 77067 MAMMO DIGITAL SCREENING BILAT WITH TOMO: ICD-10-PCS | Mod: 26,,, | Performed by: RADIOLOGY

## 2022-12-01 ENCOUNTER — HOSPITAL ENCOUNTER (OUTPATIENT)
Dept: RADIOLOGY | Facility: HOSPITAL | Age: 72
Discharge: HOME | End: 2022-12-01
Attending: NURSE PRACTITIONER
Payer: MEDICARE

## 2022-12-01 DIAGNOSIS — Z78.0 POST-MENOPAUSE: ICD-10-CM

## 2022-12-01 PROCEDURE — 77080 DXA BONE DENSITY AXIAL: CPT | Mod: TC

## 2022-12-16 LAB — NONINV COLON CA DNA+OCC BLD SCRN STL QL: NEGATIVE

## 2022-12-29 ENCOUNTER — DOCUMENTATION ONLY (OUTPATIENT)
Dept: INTERNAL MEDICINE | Facility: CLINIC | Age: 72
End: 2022-12-29
Payer: MEDICARE

## 2023-02-06 ENCOUNTER — LAB VISIT (OUTPATIENT)
Dept: LAB | Facility: HOSPITAL | Age: 73
End: 2023-02-06
Attending: STUDENT IN AN ORGANIZED HEALTH CARE EDUCATION/TRAINING PROGRAM
Payer: MEDICARE

## 2023-02-06 DIAGNOSIS — N18.2 TYPE 2 DIABETES MELLITUS WITH STAGE 2 CHRONIC KIDNEY DISEASE, WITHOUT LONG-TERM CURRENT USE OF INSULIN: Chronic | ICD-10-CM

## 2023-02-06 DIAGNOSIS — E55.9 VITAMIN D DEFICIENCY, UNSPECIFIED: ICD-10-CM

## 2023-02-06 DIAGNOSIS — N18.31 STAGE 3A CHRONIC KIDNEY DISEASE: ICD-10-CM

## 2023-02-06 DIAGNOSIS — E66.09 CLASS 1 OBESITY DUE TO EXCESS CALORIES WITH SERIOUS COMORBIDITY AND BODY MASS INDEX (BMI) OF 31.0 TO 31.9 IN ADULT: Chronic | ICD-10-CM

## 2023-02-06 DIAGNOSIS — E11.22 TYPE 2 DIABETES MELLITUS WITH STAGE 2 CHRONIC KIDNEY DISEASE, WITHOUT LONG-TERM CURRENT USE OF INSULIN: Chronic | ICD-10-CM

## 2023-02-06 DIAGNOSIS — E78.00 HYPERCHOLESTEROLEMIA: Chronic | ICD-10-CM

## 2023-02-06 LAB
ALBUMIN SERPL-MCNC: 3.8 G/DL (ref 3.4–4.8)
ALBUMIN/GLOB SERPL: 1.2 RATIO (ref 1.1–2)
ALP SERPL-CCNC: 71 UNIT/L (ref 40–150)
ALT SERPL-CCNC: 13 UNIT/L (ref 0–55)
AST SERPL-CCNC: 16 UNIT/L (ref 5–34)
BASOPHILS # BLD AUTO: 0.03 X10(3)/MCL (ref 0–0.2)
BASOPHILS NFR BLD AUTO: 0.4 %
BILIRUBIN DIRECT+TOT PNL SERPL-MCNC: 0.3 MG/DL
BUN SERPL-MCNC: 19.7 MG/DL (ref 9.8–20.1)
CALCIUM SERPL-MCNC: 8.8 MG/DL (ref 8.4–10.2)
CHLORIDE SERPL-SCNC: 105 MMOL/L (ref 98–107)
CHOLEST SERPL-MCNC: 112 MG/DL
CHOLEST/HDLC SERPL: 3 {RATIO} (ref 0–5)
CO2 SERPL-SCNC: 31 MMOL/L (ref 23–31)
CREAT SERPL-MCNC: 0.98 MG/DL (ref 0.55–1.02)
CREAT UR-MCNC: 74.7 MG/DL (ref 47–110)
DEPRECATED CALCIDIOL+CALCIFEROL SERPL-MC: 27.2 NG/ML (ref 30–80)
EOSINOPHIL # BLD AUTO: 0.27 X10(3)/MCL (ref 0–0.9)
EOSINOPHIL NFR BLD AUTO: 3.2 %
ERYTHROCYTE [DISTWIDTH] IN BLOOD BY AUTOMATED COUNT: 15.1 % (ref 11.5–17)
EST. AVERAGE GLUCOSE BLD GHB EST-MCNC: 188.6 MG/DL
GFR SERPLBLD CREATININE-BSD FMLA CKD-EPI: >60 MLS/MIN/1.73/M2
GLOBULIN SER-MCNC: 3.2 GM/DL (ref 2.4–3.5)
GLUCOSE SERPL-MCNC: 80 MG/DL (ref 82–115)
HBA1C MFR BLD: 8.2 %
HCT VFR BLD AUTO: 36.9 % (ref 37–47)
HDLC SERPL-MCNC: 43 MG/DL (ref 35–60)
HGB BLD-MCNC: 11.5 GM/DL (ref 12–16)
IMM GRANULOCYTES # BLD AUTO: 0.02 X10(3)/MCL (ref 0–0.04)
IMM GRANULOCYTES NFR BLD AUTO: 0.2 %
LDLC SERPL CALC-MCNC: 55 MG/DL (ref 50–140)
LYMPHOCYTES # BLD AUTO: 2.18 X10(3)/MCL (ref 0.6–4.6)
LYMPHOCYTES NFR BLD AUTO: 25.7 %
MCH RBC QN AUTO: 28.3 PG
MCHC RBC AUTO-ENTMCNC: 31.2 MG/DL (ref 33–36)
MCV RBC AUTO: 90.7 FL (ref 80–94)
MONOCYTES # BLD AUTO: 0.51 X10(3)/MCL (ref 0.1–1.3)
MONOCYTES NFR BLD AUTO: 6 %
NEUTROPHILS # BLD AUTO: 5.47 X10(3)/MCL (ref 2.1–9.2)
NEUTROPHILS NFR BLD AUTO: 64.5 %
NRBC BLD AUTO-RTO: 0 %
PLATELET # BLD AUTO: 274 X10(3)/MCL (ref 130–400)
PMV BLD AUTO: 9.4 FL (ref 7.4–10.4)
POTASSIUM SERPL-SCNC: 4.4 MMOL/L (ref 3.5–5.1)
PROT SERPL-MCNC: 7 GM/DL (ref 5.8–7.6)
PROT UR STRIP-MCNC: 7.9 MG/DL
PTH-INTACT SERPL-MCNC: 128.5 PG/ML (ref 8.7–77)
RBC # BLD AUTO: 4.07 X10(6)/MCL (ref 4.2–5.4)
SODIUM SERPL-SCNC: 143 MMOL/L (ref 136–145)
TRIGL SERPL-MCNC: 72 MG/DL (ref 37–140)
URINE PROTEIN/CREATININE RATIO (OHS): 0.1
VLDLC SERPL CALC-MCNC: 14 MG/DL
WBC # SPEC AUTO: 8.5 X10(3)/MCL (ref 4.5–11.5)

## 2023-02-06 PROCEDURE — 82570 ASSAY OF URINE CREATININE: CPT

## 2023-02-06 PROCEDURE — 85025 COMPLETE CBC W/AUTO DIFF WBC: CPT

## 2023-02-06 PROCEDURE — 80053 COMPREHEN METABOLIC PANEL: CPT

## 2023-02-06 PROCEDURE — 83970 ASSAY OF PARATHORMONE: CPT

## 2023-02-06 PROCEDURE — 80061 LIPID PANEL: CPT

## 2023-02-06 PROCEDURE — 36415 COLL VENOUS BLD VENIPUNCTURE: CPT

## 2023-02-06 PROCEDURE — 83036 HEMOGLOBIN GLYCOSYLATED A1C: CPT

## 2023-02-06 PROCEDURE — 82306 VITAMIN D 25 HYDROXY: CPT

## 2023-02-08 RX ORDER — AMLODIPINE BESYLATE 5 MG/1
1 TABLET ORAL DAILY
COMMUNITY
End: 2023-02-10

## 2023-02-10 ENCOUNTER — OFFICE VISIT (OUTPATIENT)
Dept: NEPHROLOGY | Facility: CLINIC | Age: 73
End: 2023-02-10
Payer: MEDICARE

## 2023-02-10 VITALS
OXYGEN SATURATION: 98 % | HEIGHT: 64 IN | RESPIRATION RATE: 20 BRPM | HEART RATE: 75 BPM | DIASTOLIC BLOOD PRESSURE: 88 MMHG | SYSTOLIC BLOOD PRESSURE: 128 MMHG | WEIGHT: 184 LBS | TEMPERATURE: 98 F | BODY MASS INDEX: 31.41 KG/M2

## 2023-02-10 DIAGNOSIS — N18.2 TYPE 2 DIABETES MELLITUS WITH STAGE 2 CHRONIC KIDNEY DISEASE, WITHOUT LONG-TERM CURRENT USE OF INSULIN: Chronic | ICD-10-CM

## 2023-02-10 DIAGNOSIS — N18.2 STAGE 2 CHRONIC KIDNEY DISEASE DUE TO TYPE 2 DIABETES MELLITUS: Chronic | ICD-10-CM

## 2023-02-10 DIAGNOSIS — N18.31 CHRONIC KIDNEY DISEASE, STAGE 3A: ICD-10-CM

## 2023-02-10 DIAGNOSIS — I10 ESSENTIAL HYPERTENSION: Chronic | ICD-10-CM

## 2023-02-10 DIAGNOSIS — E11.22 TYPE 2 DIABETES MELLITUS WITH STAGE 2 CHRONIC KIDNEY DISEASE, WITHOUT LONG-TERM CURRENT USE OF INSULIN: Chronic | ICD-10-CM

## 2023-02-10 DIAGNOSIS — L90.0 LICHEN SCLEROSUS ET ATROPHICUS: Primary | ICD-10-CM

## 2023-02-10 DIAGNOSIS — E11.22 STAGE 2 CHRONIC KIDNEY DISEASE DUE TO TYPE 2 DIABETES MELLITUS: Chronic | ICD-10-CM

## 2023-02-10 PROCEDURE — 99215 OFFICE O/P EST HI 40 MIN: CPT | Mod: PBBFAC | Performed by: INTERNAL MEDICINE

## 2023-02-10 NOTE — PROGRESS NOTES
"[facility] NEPHROLOGY  OUTPATIENT OFFICE VISIT NOTE    SUBJECTIVE:      HPI: Ms. Briggs is a 71-year-old  female with past medical history of diabetes mellitus type 2 (diagnosed in her 20s), chronic kidney disease, hypertension, dyslipidemia, and obesity.  Presents for follow-up appointment in Nephrology Clinic, last seen 9/9/22. She denies any new complaints today, does mention chronic lower back pain w/sciatica, otherwise states she has been doing well since her last visit. HTN and DM well controlled.       ROS:  Constitutional: no fever, fatigue, weakness  Eye: no vision loss, eye redness, drainage, or pain  ENMT: no sore throat, ear pain, sinus pain/congestion, nasal congestion/drainage  Respiratory: no cough, no wheezing, no shortness of breath  Cardiovascular: no chest pain, no palpitations, no edema  Gastrointestinal: no nausea, vomiting, or diarrhea. No abdominal pain  Genitourinary: no dysuria, no urinary frequency or urgency, no hematuria  Hema/Lymph: no abnormal bruising or bleeding  Endocrine: no heat or cold intolerance, no excessive thirst or excessive urination  Musculoskeletal: +chronic lower back/sciatic pain  Integumentary: no skin rash or abnormal lesion  Neurologic: no headache, no dizziness, no weakness or numbness         OBJECTIVE:     Vital signs:   /88 (BP Location: Left arm, Patient Position: Sitting, BP Method: Large (Automatic))   Pulse 75   Temp 98.1 °F (36.7 °C) (Oral)   Resp 20   Ht 5' 4" (1.626 m)   Wt 83.5 kg (184 lb)   SpO2 98%   BMI 31.58 kg/m²      Physical Examination:  General: well-developed well-nourished -American woman in no acute distress  Eye: PERRLA, EOMI, clear conjunctiva, eyelids normal  HENT: NC/AT, moist mucus membranes  Neck: full range of motion, no JVD  Respiratory: clear to auscultation bilaterally, respirations non-labored  Cardiovascular: regular rate and rhythm without murmurs, gallops or rubs  Gastrointestinal: soft, non-tender, " non-distended with normal bowel sounds, without masses to palpation  Genitourinary: no CVA tenderness to palpation  Musculoskeletal: no obvious deformities, full range of motion of all extremities/spine without limitation, minimal discomfort noted with flexion  Integumentary: no rashes or skin lesions present  Extremities: radial and DP pulses 2+ bilaterally, no LE edema  Neurologic: CN II-XII intact, no signs of peripheral neurological deficit, motor/sensory function intact         ASSESSMENT & PLAN:     CKD Stage II (GFR >60)  - BUN/Cr 19.7/0.98, eGFR improved to > 60 was 58 at last visit  - Urine protein/creatinine ratio WNL  - H&H 11.5/36.9, MCV 90.6 normocytic anemia  - Vit D 27.2, intact PTH elevated 128, calcium 8.8; Dexa-scan form 11/18/22 WNL  - Continue:    -follow 2 g a day dietary sodium restriction    -controlled diabetes (goal A1c less than 7%)    -control high blood pressure (goal blood pressure is less than 130/80, please check blood pressure twice a week and bring blood pressure logs to office visit)    -exercise at least 30 minutes a day, 5 days a week    -maintain healthy weight    -decrease or stop alcohol use    -do not smoke    -stay well hydrated (drink water only, avoid juices, sweet tea, and sodas)    -ask about staying up-to-date on vaccinations (flu vaccine, pneumonia vaccine, hepatitis B vaccine)    -avoid excessive use of NSAIDs (ibuprofen, naproxen, Aleve, Advil, Toradol, Mobic), take Tylenol as needed for headache or mild pain    -take cholesterol lowering medications if prescribed (LDL goal less than 100)    Diabetes Mellitus type II  - A1c 8.2, stable but above goal of 7  - Continue on diabetic regimen per PCP   - Dducated on healthy diet and exercise     HTN  - /88 mmHg at goal  - Continue on Lisinopril and coreg  - Educated on DASH diet and exercise     HLD  - Continue to monitor, LDL at goal <70  - Last LDL 55    Obesity  - Encouraged healthy low fat diet and exercise   -  Continue to monitor     Return to clinic in 6 months with labs    Allegra Claros MD  [facility] Internal Medicine, PGYII

## 2023-02-16 ENCOUNTER — OFFICE VISIT (OUTPATIENT)
Dept: INTERNAL MEDICINE | Facility: CLINIC | Age: 73
End: 2023-02-16
Payer: MEDICARE

## 2023-02-16 VITALS
TEMPERATURE: 98 F | DIASTOLIC BLOOD PRESSURE: 83 MMHG | HEART RATE: 81 BPM | BODY MASS INDEX: 30.96 KG/M2 | WEIGHT: 181.38 LBS | HEIGHT: 64 IN | RESPIRATION RATE: 18 BRPM | SYSTOLIC BLOOD PRESSURE: 129 MMHG

## 2023-02-16 DIAGNOSIS — G47.00 INSOMNIA, UNSPECIFIED TYPE: ICD-10-CM

## 2023-02-16 DIAGNOSIS — F41.9 ANXIETY: ICD-10-CM

## 2023-02-16 DIAGNOSIS — F32.0 CURRENT MILD EPISODE OF MAJOR DEPRESSIVE DISORDER, UNSPECIFIED WHETHER RECURRENT: Primary | Chronic | ICD-10-CM

## 2023-02-16 PROCEDURE — 3066F NEPHROPATHY DOC TX: CPT | Mod: CPTII,,, | Performed by: NURSE PRACTITIONER

## 2023-02-16 PROCEDURE — 3079F PR MOST RECENT DIASTOLIC BLOOD PRESSURE 80-89 MM HG: ICD-10-PCS | Mod: CPTII,,, | Performed by: NURSE PRACTITIONER

## 2023-02-16 PROCEDURE — 3079F DIAST BP 80-89 MM HG: CPT | Mod: CPTII,,, | Performed by: NURSE PRACTITIONER

## 2023-02-16 PROCEDURE — 99214 OFFICE O/P EST MOD 30 MIN: CPT | Mod: PBBFAC | Performed by: NURSE PRACTITIONER

## 2023-02-16 PROCEDURE — 1160F PR REVIEW ALL MEDS BY PRESCRIBER/CLIN PHARMACIST DOCUMENTED: ICD-10-PCS | Mod: CPTII,,, | Performed by: NURSE PRACTITIONER

## 2023-02-16 PROCEDURE — 3066F PR DOCUMENTATION OF TREATMENT FOR NEPHROPATHY: ICD-10-PCS | Mod: CPTII,,, | Performed by: NURSE PRACTITIONER

## 2023-02-16 PROCEDURE — 1159F PR MEDICATION LIST DOCUMENTED IN MEDICAL RECORD: ICD-10-PCS | Mod: CPTII,,, | Performed by: NURSE PRACTITIONER

## 2023-02-16 PROCEDURE — 3008F BODY MASS INDEX DOCD: CPT | Mod: CPTII,,, | Performed by: NURSE PRACTITIONER

## 2023-02-16 PROCEDURE — 3074F PR MOST RECENT SYSTOLIC BLOOD PRESSURE < 130 MM HG: ICD-10-PCS | Mod: CPTII,,, | Performed by: NURSE PRACTITIONER

## 2023-02-16 PROCEDURE — 1126F PR PAIN SEVERITY QUANTIFIED, NO PAIN PRESENT: ICD-10-PCS | Mod: CPTII,,, | Performed by: NURSE PRACTITIONER

## 2023-02-16 PROCEDURE — 1126F AMNT PAIN NOTED NONE PRSNT: CPT | Mod: CPTII,,, | Performed by: NURSE PRACTITIONER

## 2023-02-16 PROCEDURE — 1159F MED LIST DOCD IN RCRD: CPT | Mod: CPTII,,, | Performed by: NURSE PRACTITIONER

## 2023-02-16 PROCEDURE — 99213 PR OFFICE/OUTPT VISIT, EST, LEVL III, 20-29 MIN: ICD-10-PCS | Mod: S$PBB,,, | Performed by: NURSE PRACTITIONER

## 2023-02-16 PROCEDURE — 3008F PR BODY MASS INDEX (BMI) DOCUMENTED: ICD-10-PCS | Mod: CPTII,,, | Performed by: NURSE PRACTITIONER

## 2023-02-16 PROCEDURE — 1160F RVW MEDS BY RX/DR IN RCRD: CPT | Mod: CPTII,,, | Performed by: NURSE PRACTITIONER

## 2023-02-16 PROCEDURE — 3072F PR LOW RISK FOR RETINOPATHY: ICD-10-PCS | Mod: CPTII,,, | Performed by: NURSE PRACTITIONER

## 2023-02-16 PROCEDURE — 99213 OFFICE O/P EST LOW 20 MIN: CPT | Mod: S$PBB,,, | Performed by: NURSE PRACTITIONER

## 2023-02-16 PROCEDURE — 3072F LOW RISK FOR RETINOPATHY: CPT | Mod: CPTII,,, | Performed by: NURSE PRACTITIONER

## 2023-02-16 PROCEDURE — 3074F SYST BP LT 130 MM HG: CPT | Mod: CPTII,,, | Performed by: NURSE PRACTITIONER

## 2023-02-16 RX ORDER — MIRTAZAPINE 15 MG/1
15 TABLET, FILM COATED ORAL NIGHTLY
Qty: 90 TABLET | Refills: 1 | Status: SHIPPED | OUTPATIENT
Start: 2023-02-16 | End: 2023-07-12

## 2023-02-16 RX ORDER — DULOXETIN HYDROCHLORIDE 30 MG/1
30 CAPSULE, DELAYED RELEASE ORAL 2 TIMES DAILY
Qty: 180 CAPSULE | Refills: 1 | Status: SHIPPED | OUTPATIENT
Start: 2023-02-16 | End: 2023-12-06
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
## 2023-02-16 NOTE — PATIENT INSTRUCTIONS
1. Continue Cymbalta 30mg twice a day  2. Continue Remeron 15mg every night  3. Fuollow-up in 6 months

## 2023-02-24 ENCOUNTER — OFFICE VISIT (OUTPATIENT)
Dept: INTERNAL MEDICINE | Facility: CLINIC | Age: 73
End: 2023-02-24
Payer: MEDICARE

## 2023-02-24 VITALS
TEMPERATURE: 98 F | WEIGHT: 187.38 LBS | RESPIRATION RATE: 18 BRPM | HEIGHT: 64 IN | SYSTOLIC BLOOD PRESSURE: 126 MMHG | HEART RATE: 78 BPM | BODY MASS INDEX: 31.99 KG/M2 | DIASTOLIC BLOOD PRESSURE: 82 MMHG

## 2023-02-24 DIAGNOSIS — E06.3 HASHIMOTO'S THYROIDITIS: Chronic | ICD-10-CM

## 2023-02-24 DIAGNOSIS — G89.29 CHRONIC MIDLINE LOW BACK PAIN WITH RIGHT-SIDED SCIATICA: Chronic | ICD-10-CM

## 2023-02-24 DIAGNOSIS — M54.41 CHRONIC MIDLINE LOW BACK PAIN WITH RIGHT-SIDED SCIATICA: Chronic | ICD-10-CM

## 2023-02-24 DIAGNOSIS — E11.22 TYPE 2 DIABETES MELLITUS WITH STAGE 2 CHRONIC KIDNEY DISEASE, WITH LONG-TERM CURRENT USE OF INSULIN: Primary | Chronic | ICD-10-CM

## 2023-02-24 DIAGNOSIS — N18.2 TYPE 2 DIABETES MELLITUS WITH STAGE 2 CHRONIC KIDNEY DISEASE, WITH LONG-TERM CURRENT USE OF INSULIN: Primary | Chronic | ICD-10-CM

## 2023-02-24 DIAGNOSIS — Z79.4 TYPE 2 DIABETES MELLITUS WITH STAGE 2 CHRONIC KIDNEY DISEASE, WITH LONG-TERM CURRENT USE OF INSULIN: Primary | Chronic | ICD-10-CM

## 2023-02-24 DIAGNOSIS — E78.00 HYPERCHOLESTEROLEMIA: Chronic | ICD-10-CM

## 2023-02-24 DIAGNOSIS — I10 PRIMARY HYPERTENSION: Chronic | ICD-10-CM

## 2023-02-24 DIAGNOSIS — K21.9 GASTROESOPHAGEAL REFLUX DISEASE, UNSPECIFIED WHETHER ESOPHAGITIS PRESENT: Chronic | ICD-10-CM

## 2023-02-24 DIAGNOSIS — F32.0 CURRENT MILD EPISODE OF MAJOR DEPRESSIVE DISORDER, UNSPECIFIED WHETHER RECURRENT: Chronic | ICD-10-CM

## 2023-02-24 DIAGNOSIS — E66.09 CLASS 1 OBESITY DUE TO EXCESS CALORIES WITH SERIOUS COMORBIDITY AND BODY MASS INDEX (BMI) OF 32.0 TO 32.9 IN ADULT: Chronic | ICD-10-CM

## 2023-02-24 PROCEDURE — 1101F PR PT FALLS ASSESS DOC 0-1 FALLS W/OUT INJ PAST YR: ICD-10-PCS | Mod: CPTII,,, | Performed by: NURSE PRACTITIONER

## 2023-02-24 PROCEDURE — 1160F RVW MEDS BY RX/DR IN RCRD: CPT | Mod: CPTII,,, | Performed by: NURSE PRACTITIONER

## 2023-02-24 PROCEDURE — 3074F SYST BP LT 130 MM HG: CPT | Mod: CPTII,,, | Performed by: NURSE PRACTITIONER

## 2023-02-24 PROCEDURE — 1101F PT FALLS ASSESS-DOCD LE1/YR: CPT | Mod: CPTII,,, | Performed by: NURSE PRACTITIONER

## 2023-02-24 PROCEDURE — 3288F FALL RISK ASSESSMENT DOCD: CPT | Mod: CPTII,,, | Performed by: NURSE PRACTITIONER

## 2023-02-24 PROCEDURE — 1125F PR PAIN SEVERITY QUANTIFIED, PAIN PRESENT: ICD-10-PCS | Mod: CPTII,,, | Performed by: NURSE PRACTITIONER

## 2023-02-24 PROCEDURE — 99214 OFFICE O/P EST MOD 30 MIN: CPT | Mod: S$PBB,,, | Performed by: NURSE PRACTITIONER

## 2023-02-24 PROCEDURE — 4010F PR ACE/ARB THEARPY RXD/TAKEN: ICD-10-PCS | Mod: CPTII,,, | Performed by: NURSE PRACTITIONER

## 2023-02-24 PROCEDURE — 3074F PR MOST RECENT SYSTOLIC BLOOD PRESSURE < 130 MM HG: ICD-10-PCS | Mod: CPTII,,, | Performed by: NURSE PRACTITIONER

## 2023-02-24 PROCEDURE — 3008F PR BODY MASS INDEX (BMI) DOCUMENTED: ICD-10-PCS | Mod: CPTII,,, | Performed by: NURSE PRACTITIONER

## 2023-02-24 PROCEDURE — 3072F LOW RISK FOR RETINOPATHY: CPT | Mod: CPTII,,, | Performed by: NURSE PRACTITIONER

## 2023-02-24 PROCEDURE — 1159F PR MEDICATION LIST DOCUMENTED IN MEDICAL RECORD: ICD-10-PCS | Mod: CPTII,,, | Performed by: NURSE PRACTITIONER

## 2023-02-24 PROCEDURE — 3008F BODY MASS INDEX DOCD: CPT | Mod: CPTII,,, | Performed by: NURSE PRACTITIONER

## 2023-02-24 PROCEDURE — 99215 OFFICE O/P EST HI 40 MIN: CPT | Mod: PBBFAC | Performed by: NURSE PRACTITIONER

## 2023-02-24 PROCEDURE — 3066F PR DOCUMENTATION OF TREATMENT FOR NEPHROPATHY: ICD-10-PCS | Mod: CPTII,,, | Performed by: NURSE PRACTITIONER

## 2023-02-24 PROCEDURE — 1125F AMNT PAIN NOTED PAIN PRSNT: CPT | Mod: CPTII,,, | Performed by: NURSE PRACTITIONER

## 2023-02-24 PROCEDURE — 3072F PR LOW RISK FOR RETINOPATHY: ICD-10-PCS | Mod: CPTII,,, | Performed by: NURSE PRACTITIONER

## 2023-02-24 PROCEDURE — 4010F ACE/ARB THERAPY RXD/TAKEN: CPT | Mod: CPTII,,, | Performed by: NURSE PRACTITIONER

## 2023-02-24 PROCEDURE — 3066F NEPHROPATHY DOC TX: CPT | Mod: CPTII,,, | Performed by: NURSE PRACTITIONER

## 2023-02-24 PROCEDURE — 1160F PR REVIEW ALL MEDS BY PRESCRIBER/CLIN PHARMACIST DOCUMENTED: ICD-10-PCS | Mod: CPTII,,, | Performed by: NURSE PRACTITIONER

## 2023-02-24 PROCEDURE — 3079F DIAST BP 80-89 MM HG: CPT | Mod: CPTII,,, | Performed by: NURSE PRACTITIONER

## 2023-02-24 PROCEDURE — 1159F MED LIST DOCD IN RCRD: CPT | Mod: CPTII,,, | Performed by: NURSE PRACTITIONER

## 2023-02-24 PROCEDURE — 3288F PR FALLS RISK ASSESSMENT DOCUMENTED: ICD-10-PCS | Mod: CPTII,,, | Performed by: NURSE PRACTITIONER

## 2023-02-24 PROCEDURE — 99214 PR OFFICE/OUTPT VISIT, EST, LEVL IV, 30-39 MIN: ICD-10-PCS | Mod: S$PBB,,, | Performed by: NURSE PRACTITIONER

## 2023-02-24 PROCEDURE — 3079F PR MOST RECENT DIASTOLIC BLOOD PRESSURE 80-89 MM HG: ICD-10-PCS | Mod: CPTII,,, | Performed by: NURSE PRACTITIONER

## 2023-02-24 RX ORDER — CARVEDILOL 12.5 MG/1
12.5 TABLET ORAL 2 TIMES DAILY
Qty: 180 TABLET | Refills: 1 | Status: SHIPPED | OUTPATIENT
Start: 2023-02-24 | End: 2023-08-18 | Stop reason: SDUPTHER

## 2023-02-24 RX ORDER — GABAPENTIN 600 MG/1
600 TABLET ORAL 2 TIMES DAILY
Qty: 180 TABLET | Refills: 1 | Status: SHIPPED | OUTPATIENT
Start: 2023-02-24 | End: 2023-07-12

## 2023-02-24 RX ORDER — LISINOPRIL 10 MG/1
10 TABLET ORAL DAILY
Qty: 90 TABLET | Refills: 1 | Status: SHIPPED | OUTPATIENT
Start: 2023-02-24 | End: 2023-08-18 | Stop reason: SDUPTHER

## 2023-02-24 RX ORDER — METFORMIN HYDROCHLORIDE 1000 MG/1
1000 TABLET ORAL 2 TIMES DAILY WITH MEALS
Qty: 180 TABLET | Refills: 1 | Status: SHIPPED | OUTPATIENT
Start: 2023-02-24 | End: 2023-08-18 | Stop reason: SDUPTHER

## 2023-02-24 RX ORDER — CYCLOBENZAPRINE HCL 10 MG
10 TABLET ORAL NIGHTLY
Qty: 90 TABLET | Refills: 1 | Status: SHIPPED | OUTPATIENT
Start: 2023-02-24 | End: 2023-08-18

## 2023-02-24 RX ORDER — OMEPRAZOLE 40 MG/1
40 CAPSULE, DELAYED RELEASE ORAL DAILY
Qty: 90 CAPSULE | Refills: 1 | Status: SHIPPED | OUTPATIENT
Start: 2023-02-24 | End: 2023-08-18 | Stop reason: SDUPTHER

## 2023-02-24 RX ORDER — INSULIN GLARGINE 100 [IU]/ML
30 INJECTION, SOLUTION SUBCUTANEOUS DAILY
Qty: 27 ML | Refills: 1 | Status: SHIPPED | OUTPATIENT
Start: 2023-02-24 | End: 2023-06-05 | Stop reason: SDUPTHER

## 2023-02-24 RX ORDER — ATORVASTATIN CALCIUM 80 MG/1
80 TABLET, FILM COATED ORAL DAILY
Qty: 90 TABLET | Refills: 1 | Status: SHIPPED | OUTPATIENT
Start: 2023-02-24 | End: 2023-08-18 | Stop reason: SDUPTHER

## 2023-02-24 RX ORDER — PIOGLITAZONEHYDROCHLORIDE 45 MG/1
45 TABLET ORAL DAILY
Qty: 90 TABLET | Refills: 1 | Status: SHIPPED | OUTPATIENT
Start: 2023-02-24 | End: 2023-07-12

## 2023-02-24 NOTE — PROGRESS NOTES
Patient ID: [id number]     Chief Complaint: Follow-up (Continue to have aches and pains)    HPI:     Marnie Briggs is a 72 y.o. female with diagnosis of HTN, HLD, DM2, CKD 3a, Hashimoto's, Obesity. Patient seen in clinic today for follow up on DM2. Patient last seen in clinic on 11/18/2022.   At previous appointment, Metformin increased to 1,000 mg bid, Lantus 30 units daily and Actos 30 mg daily continued for DM2. Prevous A1c 9.2; current A1c 8.2. Patient states taking medication as prescribed, tolerating well. Patient states she checks her CBGs at home and denies hypoglycemia. States lowest CBG noted was 101.   Patient continued on Atorvastatin 80 mg daily for HLD. Patient states taking medication as prescribed, tolerating well.   Patient continued on Lisinopril 10 mg daily and Coreg 12.5 mg bid for HTN. Patient states taking medication as prescribed, tolerating well. Patient states she checks her B/P at home, denies HTN/hypotension.   Today, patient states back pain, requesting physical therapy referral. Patient currently prescribed Gabapentin 600 mg bid, Flexeril 10 mg Qhs for pain. States helps but not enough. Patient refuses Orthopedic/Neurosurgery referral. Patient states she has been to pain management but did not help. Patient denies bilateral sciatica, saddle anesthesia, bowel/bladder incontinence.   Patient denies any other acute complaints.     Patient followed by Mental Health Provider, Angie Mims NP. Last appointment on 02/16/2023. No medication changes needed. FU in 6 months. Patient has follow up appointment scheduled for 08/16/2023.     Patient followed by Nephrology Clinic. Last appointment on 02/10/2023. Ms. Briggs is a 71-year-old  female with past medical history of diabetes mellitus type 2 (diagnosed in her 20s), chronic kidney disease, hypertension, dyslipidemia, and obesity.  Presents for follow-up appointment in Nephrology Clinic, last seen 9/9/22. She denies any new  complaints today, does mention chronic lower back pain w/sciatica, otherwise states she has been doing well since her last visit. HTN and DM well controlled. CKD Stage II (GFR >60): BUN/Cr 19.7/0.98, eGFR improved to > 60 was 58 at last visit; Urine protein/creatinine ratio WNL; H&H 11.5/36.9, MCV 90.6 normocytic anemia; Vit D 27.2, intact PTH elevated 128, calcium 8.8; Dexa-scan form 22 WNL. Diabetes Mellitus type II: A1c 8.2, stable but above goal of 7; Continue on diabetic regimen per PCP; Dducated on healthy diet and exercise. HTN: /88 mmHg at goal; Continue on Lisinopril and coreg; Educated on DASH diet and exercise. HLD: Continue to monitor, LDL at goal <70; Last LDL 55. Obesity: Encouraged healthy low fat diet and exercise; Continue to monitor. Return to clinic in 6 months with labs. Patient has follow up appointment scheduled for 2023.     Patient followed by Ophthalmology Clinic. Last appointment on 2022. T2DM without ophthalmologic manifestations: Last A1c as below: 9.2; No signs of retinopathy on exam; No signs of DME on OCT mac; Annual fundus photo obtained 22; Encouraged good BS/BP/Cholesterol control, f/u with PCP regularly; Monitor with annual DFE (next due 2023). ERM with foveoschisis OS: seen with Dr. Martin 21: believes this is ERM with foveoschisis rather than a partial thickness hole; No treatment indicated for now as vision good and patient having no symptoms; Patient happy with vision, denies new visual problems including metamorphopsias. Pseudophakia OU; PCO, non-visually significant, right eye: NVS, patient happy with vision. RTC 6 months for DFE OU, OCT mac OU. Patient has follow up appointment scheduled for 2023.      Patient followed by GYN clinic. Last appointment on 2022. Pt is  (1 stillbirth) here for annual GYN exam. Denies hx of abnormal pap smears. Hx of hysterectomy in  secondary to uterine fibroids. Denies any hot flashes.  Denies any vaginal bleeding or discharge. Denies any abdominal or pelvic pain. Denies any urinary or breast complaints. Last MG-11/22/21-BIRADS 1. Denies any family hx of breast, uterine, or ovarian cancer. Pt has a hx of lichen sclerosus to the vaginal area and bilateral breast, diagnosed by dermatology with punch biopsy pathology report on 5/26/2015 was significant for lichen sclerosis. Denies itching, irritation, she is no longer using Clobetasol PRN. Denies fly hx of breast, ovarian, uterine or colon cancer. FIT neg in 11/2021. [id number]. BP-150/71 did not take BP medication this AM. No complaints today. Lichen sclerosus et atrophicus: Pelvic today, pap deferred d/t hysterectomy. Calcium 600 and Vitamin D 400 BID for bone health. MMG ordered. Continue no scented products or hot baths, call if any itching or irritation, will resume Clobetasol. Follow up in about 1 year (around 9/27/2023) for annual exam. Patient has follow up appointment scheduled for 09/28/2023.      Patient followed by Endocrinology Clinic for DM2. Last appointment on 05/03/2021. History of uncontrolled type 2 diabetes with neuropathy, hypertension, hyperlipidemia, vitamin D deficiency. Type 2 diabetes current A1c 6.7 previous 6.6, 10.2 and 10.4. Patient has had improved A1c and type 2 diabetes is now controlled. CBGs patient checks 2-3 times per day ranges  pt changes her inuslin to 20-30 units leading to hypoglycemia, reeducated on not changing basal insulin dosing. Patient has occasional hypoglycemia in the 70s is when patient gives her higher doses of basal insulin at night. Hypertension at goal today, hyperlipidemia Total 111 HDL 38 triglycerides 115 LDL 50 at goal per guidelines patient is currently on a statin. Vitamin D deficiency current level 28.4 patient is to start over-the-counter vitamin D3 2000 IUs daily. Hashimoto's recently diagnosed TSH 5.7204 Free T4 0.86 On previous labs on 12/21/2020 TPO positive TPO quantitative  1130. Patient denies fatigue or weight gain. We will continue to monitor repeat on follow-up visit. Medications: continue Metformin 500 mg BID, Actos 30mg. Change Lantus to 25 units daily. Patient was to follow up in 6 months but missed appointment.     Review of patient's allergies indicates:  No Known Allergies    Breast Cancer Screenin2022  Cervical Cancer Screening:   Colorectal Cancer Screening:   Diabetic Eye Exam: 2022  Diabetic Foot Exam: 2022  Lung Cancer Screening: N/A  Prostate Cancer Screening: N/A  AAA Screening: N/A  Osteoporosis Screenin2022, normal  Medicare Wellness: ordered  Immunizations:   Immunization History   Administered Date(s) Administered    COVID-19 Vaccine 2022, 2022    COVID-19, MRNA, LN-S, PF (MODERNA FULL 0.5 ML DOSE) 2021, 2021    Influenza 10/08/2012, 10/11/2013, 2014    Influenza (FLUAD) - Quadrivalent - Adjuvanted - PF *Preferred* (65+) 2022    Influenza (FLUBLOK) - Quadrivalent - Recombinant - PF *Preferred* (egg allergy) 10/21/2019    Influenza - High Dose - PF (65 years and older) 10/24/2018    Influenza - Quadrivalent - High Dose - PF (65 years and older) 2020, 2021    Influenza - Trivalent - PF (ADULT) 12/15/2015, 10/07/2016, 10/03/2017, 2017    Influenza A (H1N1) 2009 Monovalent - IM 2009    Pneumococcal Polysaccharide - 23 Valent 12/10/2021    Zoster Recombinant 2021, 2021     Past Surgical History:   Procedure Laterality Date    ANKLE FUSION      CHOLECYSTECTOMY      COLONOSCOPY  2015    EYE SURGERY      HYSTERECTOMY      NECK SURGERY      stint      Right kidney     family history includes Cancer in her brother; Diabetes in her brother, father, and mother; Heart disease in her father; Hypertension in her father; Stroke in her mother.    Social History     Socioeconomic History    Marital status:    Tobacco Use    Smoking status: Never    Smokeless tobacco:  "Never   Substance and Sexual Activity    Alcohol use: Never    Drug use: Never    Sexual activity: Not Currently     Current Outpatient Medications   Medication Instructions    aspirin 81 mg, Oral, Daily    atorvastatin (LIPITOR) 80 mg, Oral, Daily    carvediloL (COREG) 12.5 mg, Oral, 2 times daily    cyclobenzaprine (FLEXERIL) 10 mg, Oral, Nightly    dicyclomine (BENTYL) 20 mg, Oral, Every 6 hours    DULoxetine (CYMBALTA) 30 mg, Oral, 2 times daily    gabapentin (NEURONTIN) 600 mg, Oral, 2 times daily    LANTUS SOLOSTAR U-100 INSULIN 30 Units, Subcutaneous, Daily    lisinopriL 10 mg, Oral, Daily    metFORMIN (GLUCOPHAGE) 1,000 mg, Oral, 2 times daily with meals    mirtazapine (REMERON) 15 mg, Oral, Nightly    omeprazole (PRILOSEC) 40 mg, Oral, Daily    pen needle, diabetic 29 gauge x 1/2" Ndle   Insulin pen needles, See Instructions, Inuslin pen needles for once a day Lantus injection  E11.65, # 90 EA, 3 Refill(s), Pharmacy: [id number], 162, cm, Height/Length Dosing, 12/10/21 9:34:00 CST, 90.2, kg, Weight Dosing, 12/10/21 9:34:00 CST    pioglitazone (ACTOS) 45 mg, Oral, Daily       Subjective:     Review of Systems   Constitutional: Negative.    HENT: Negative.     Eyes: Negative.    Respiratory: Negative.     Cardiovascular: Negative.    Gastrointestinal: Negative.    Endocrine: Negative.    Genitourinary: Negative.    Musculoskeletal:  Positive for back pain.   Skin: Negative.    Allergic/Immunologic: Negative.    Neurological: Negative.    Hematological: Negative.    Psychiatric/Behavioral: Negative.       Objective:     Visit Vitals  /82 (BP Location: Left arm, Patient Position: Sitting, BP Method: Large (Automatic))   Pulse 78   Temp 98.1 °F (36.7 °C) (Oral)   Resp 18   Ht 5' 4.02" (1.626 m)   Wt 85 kg (187 lb 6.4 oz)   BMI 32.15 kg/m²       Physical Exam  Vitals reviewed.   Constitutional:       Appearance: Normal appearance.   HENT:      Head: Normocephalic and atraumatic.      Mouth/Throat: "      Mouth: Mucous membranes are moist.      Pharynx: Oropharynx is clear.   Eyes:      Extraocular Movements: Extraocular movements intact.      Conjunctiva/sclera: Conjunctivae normal.      Pupils: Pupils are equal, round, and reactive to light.   Cardiovascular:      Rate and Rhythm: Normal rate and regular rhythm.      Heart sounds: Normal heart sounds.   Pulmonary:      Effort: Pulmonary effort is normal.      Breath sounds: Normal breath sounds.   Abdominal:      General: Bowel sounds are normal.   Musculoskeletal:         General: Normal range of motion.      Cervical back: Normal range of motion.   Skin:     General: Skin is warm and dry.   Neurological:      Mental Status: She is alert and oriented to person, place, and time.   Psychiatric:         Mood and Affect: Mood normal.         Behavior: Behavior normal.       Labs Reviewed:     Hematology:  Lab Results   Component Value Date    WBC 8.5 02/06/2023    HGB 11.5 (L) 02/06/2023    HCT 36.9 (L) 02/06/2023     02/06/2023     Chemistry:  Lab Results   Component Value Date     02/06/2023    K 4.4 02/06/2023    CHLORIDE 105 02/06/2023    BUN 19.7 02/06/2023    CREATININE 0.98 02/06/2023    EGFRNORACEVR >60 02/06/2023    GLUCOSE 80 (L) 02/06/2023    CALCIUM 8.8 02/06/2023    ALKPHOS 71 02/06/2023    LABPROT 7.0 02/06/2023    ALBUMIN 3.8 02/06/2023    BILIDIR 0.3 03/22/2022    IBILI 0.30 03/22/2022    AST 16 02/06/2023    ALT 13 02/06/2023    MG 1.60 09/08/2021    PHOS 4.1 09/08/2021    [id number] 27.2 (L) 02/06/2023      Lab Results   Component Value Date    HGBA1C 8.2 (H) 02/06/2023      Lipid Panel:  Lab Results   Component Value Date    CHOL 112 02/06/2023    HDL 43 02/06/2023    LDL 55.00 02/06/2023    TRIG 72 02/06/2023    TOTALCHOLEST 3 02/06/2023      Thyroid:  Lab Results   Component Value Date    TSH 2.6152 05/19/2022      Urine:  Lab Results   Component Value Date    COLORUA Light-Yellow (A) 05/19/2022    APPEARANCEUA Clear 05/19/2022     SGUA 1.013 05/19/2022    PHUA 6.5 05/19/2022    PROTEINUA Negative 05/19/2022    GLUCOSEUA Normal 05/19/2022    KETONESUA Negative 05/19/2022    BLOODUA Negative 05/19/2022    NITRITESUA Negative 05/19/2022    LEUKOCYTESUR 75 05/19/2022    RBCUA 0-5 05/19/2022    WBCUA 0-5 05/19/2022    BACTERIA None Seen 05/19/2022    SQEPUA Occ (A) 05/19/2022    HYALINECASTS None Seen 05/19/2022    CREATRANDUR 74.7 02/06/2023    PROTEINURINE 7.9 02/06/2023    UPROTCREA 0.1 02/06/2023        Assessment:       ICD-10-CM ICD-9-CM   1. Type 2 diabetes mellitus with stage 2 chronic kidney disease, with long-term current use of insulin  E11.22 250.40    N18.2 585.2    Z79.4 V58.67   2. Chronic midline low back pain with right-sided sciatica  M54.41 724.2    G89.29 724.3     338.29   3. Primary hypertension  I10 401.9   4. Hypercholesterolemia  E78.00 272.0   5. Hashimoto's thyroiditis  E06.3 245.2   6. Gastroesophageal reflux disease, unspecified whether esophagitis present  K21.9 530.81   7. Class 1 obesity due to excess calories with serious comorbidity and body mass index (BMI) of 32.0 to 32.9 in adult  E66.09 278.00    Z68.32 V85.32   8. Current mild episode of major depressive disorder, unspecified whether recurrent  F32.0 296.21        Plan:     1. Type 2 diabetes mellitus with stage 2 chronic kidney disease, with long-term current use of insulin  Continue Lantus 30 units daily, Metformin 1,000 mg bid  Increase Actos to 45 mg daily  Encouraged home CBG monitoring.  Hypoglycemic episodes: denies  Body mass index is 32.15 kg/m².  Hemoglobin A1c   Date Value Ref Range Status   02/06/2023 8.2 (H) <=7.0 % Final     Urine Creatinine   Date Value Ref Range Status   02/06/2023 74.7 47.0 - 110.0 mg/dL Final   Urine Microalbumin: <5.0  On Atorvastatin  Weight Loss Encouraged  ADA Diet  - metFORMIN (GLUCOPHAGE) 1000 MG tablet; Take 1 tablet (1,000 mg total) by mouth 2 (two) times daily with meals.  Dispense: 180 tablet; Refill: 1  - CBC  Auto Differential; Future  - Comprehensive Metabolic Panel; Future  - Hemoglobin A1C; Future    2. Chronic midline low back pain with right-sided sciatica  Continue Gabapentin, Flexeril  Physical therapy referral ordered  - gabapentin (NEURONTIN) 600 MG tablet; Take 1 tablet (600 mg total) by mouth 2 (two) times daily.  Dispense: 180 tablet; Refill: 1  - Ambulatory referral/consult to Physical/Occupational Therapy; Future    3. Primary hypertension  Vitals:    02/24/23 0747   BP: 126/82   Pulse: 78   Resp: 18   Temp: 98.1 °F (36.7 °C)      Urine Creatinine   Date Value Ref Range Status   02/06/2023 74.7 47.0 - 110.0 mg/dL Final   Urine Microalbumin: <5.0  EKG: none noted  Continue Corvedilol 12.5 mg bid, Lisinopril 10 mg daily  DASH diet  Encouraged home blood pressure monitoring  - lisinopriL 10 MG tablet; Take 1 tablet (10 mg total) by mouth once daily.  Dispense: 90 tablet; Refill: 1    4. Hypercholesterolemia  Continue Atorvastatin 80 mg daily  Weight loss encouraged  Low fat/high fiber diet  Increase physical activity  Cholesterol Total   Date Value Ref Range Status   02/06/2023 112 <=200 mg/dL Final     HDL Cholesterol   Date Value Ref Range Status   02/06/2023 43 35 - 60 mg/dL Final     Triglyceride   Date Value Ref Range Status   02/06/2023 72 37 - 140 mg/dL Final     LDL Cholesterol   Date Value Ref Range Status   02/06/2023 55.00 50.00 - 140.00 mg/dL Final   - Lipid Panel; Future    5. Hashimoto's thyroiditis  TSH level: 2.6  - Ambulatory referral/consult to Endocrinology; Future  - TSH; Future    6. Gastroesophageal reflux disease, unspecified whether esophagitis present  Continue Omeprazole 40 mg daily  Avoid spicy foods  Remain in an upright position for at least 30 minutes after consuming meals    7. Class 1 obesity due to excess calories with serious comorbidity and body mass index (BMI) of 32.0 to 32.9 in adult  Body mass index is 32.15 kg/m².  Thyroid Stimulating Hormone   Date Value Ref Range  Status   05/19/2022 2.6152 0.3500 - 4.9400 uIU/mL Final     Vit D 25 OH   Date Value Ref Range Status   02/06/2023 27.2 (L) 30.0 - 80.0 ng/mL Final     Hemoglobin A1c   Date Value Ref Range Status   02/06/2023 8.2 (H) <=7.0 % Final   Sleep Study: none noted  Weight Loss Encouraged  Increase Physical Activity    8. Current mild episode of major depressive disorder, unspecified whether recurrent  Followed by Mental Health Provider      Follow up in about 4 months (around 6/24/2023) for Labs. In addition to their scheduled follow up, the patient has also been instructed to follow up on as needed basis.     LATOYA Carias

## 2023-04-12 LAB
CREAT UR-MCNC: 55.6 MG/DL (ref 47–110)
PROT UR STRIP-MCNC: <6.8 MG/DL

## 2023-05-12 ENCOUNTER — OFFICE VISIT (OUTPATIENT)
Dept: OPHTHALMOLOGY | Facility: CLINIC | Age: 73
End: 2023-05-12
Payer: MEDICARE

## 2023-05-12 VITALS — BODY MASS INDEX: 31.99 KG/M2 | WEIGHT: 187.38 LBS | HEIGHT: 64 IN

## 2023-05-12 DIAGNOSIS — H35.372 EPIRETINAL MEMBRANE (ERM) OF LEFT EYE: Primary | ICD-10-CM

## 2023-05-12 PROCEDURE — 99213 OFFICE O/P EST LOW 20 MIN: CPT | Mod: PBBFAC,PO | Performed by: STUDENT IN AN ORGANIZED HEALTH CARE EDUCATION/TRAINING PROGRAM

## 2023-05-12 PROCEDURE — 92134 CPTRZ OPH DX IMG PST SGM RTA: CPT | Mod: PBBFAC,PO | Performed by: OPHTHALMOLOGY

## 2023-05-12 RX ORDER — LORATADINE 10 MG/1
10 TABLET ORAL DAILY
COMMUNITY

## 2023-05-12 RX ORDER — CHOLECALCIFEROL (VITAMIN D3) 25 MCG
1000 TABLET ORAL DAILY
COMMUNITY

## 2023-05-12 NOTE — PROGRESS NOTES
OCT mac 11/21/22  OD: , intact foveal contour that appears excavated as previously noted, but no IRF/SRF  OS: , temporal ERM parafoveally with schisis of foveal contour and parafoveal IRF    OCT mac 5/2023  OD: , normal/slightly excavated foveal contour  OS: , foveoschisis with parafoveal SRF    Assessment /Plan     1. T2DM without ophthalmologic manifestations  - Last A1c as below:  Hemoglobin A1c   Date Value Ref Range Status   02/06/2023 8.2 (H) <=7.0 % Final   05/19/2022 8.0 (H) <=7.0 % Final   11/22/2021 6.6 <<=7.0 % Final   - No signs of retinopathy on exam  - No signs of DME on OCT mac  - Annual fundus photo obtained 11/21/22  - Encouraged good BS/BP/Cholesterol control, f/u with PCP regularly  - Monitor with annual DFE (next due 11/2023)    2. ERM with foveoschisis OS  - seen with Dr. Martin 11/19/21: believes this is ERM with foveoschisis rather than a partial thickness hole. No treatment indicated for now as vision good and patient having no symptoms  - 5/12/23 with hyperopic shift likely exacerbation of macular pathology. RTC June-July to see Dr. Martin and discuss possible need for PPV/EMP    3. Pseudophakia OU  4. PCO, non-visually significant, right eye  - NVS, given repeat MRX today but retinal etiology    RTC 2 mo Dr. Martin day for OCT mac, DFE

## 2023-06-05 RX ORDER — INSULIN GLARGINE 100 [IU]/ML
30 INJECTION, SOLUTION SUBCUTANEOUS DAILY
Qty: 27 ML | Refills: 1 | Status: SHIPPED | OUTPATIENT
Start: 2023-06-05 | End: 2023-06-26 | Stop reason: SDUPTHER

## 2023-06-23 ENCOUNTER — LAB VISIT (OUTPATIENT)
Dept: LAB | Facility: HOSPITAL | Age: 73
End: 2023-06-23
Attending: NURSE PRACTITIONER
Payer: MEDICARE

## 2023-06-23 DIAGNOSIS — Z79.4 TYPE 2 DIABETES MELLITUS WITH STAGE 2 CHRONIC KIDNEY DISEASE, WITH LONG-TERM CURRENT USE OF INSULIN: ICD-10-CM

## 2023-06-23 DIAGNOSIS — E11.22 TYPE 2 DIABETES MELLITUS WITH STAGE 2 CHRONIC KIDNEY DISEASE, WITH LONG-TERM CURRENT USE OF INSULIN: ICD-10-CM

## 2023-06-23 DIAGNOSIS — E06.3 HASHIMOTO'S THYROIDITIS: Chronic | ICD-10-CM

## 2023-06-23 DIAGNOSIS — N18.2 TYPE 2 DIABETES MELLITUS WITH STAGE 2 CHRONIC KIDNEY DISEASE, WITH LONG-TERM CURRENT USE OF INSULIN: ICD-10-CM

## 2023-06-23 DIAGNOSIS — E78.00 HYPERCHOLESTEROLEMIA: Chronic | ICD-10-CM

## 2023-06-23 LAB
ALBUMIN SERPL-MCNC: 3.7 G/DL (ref 3.4–4.8)
ALBUMIN/GLOB SERPL: 1.2 RATIO (ref 1.1–2)
ALP SERPL-CCNC: 67 UNIT/L (ref 40–150)
ALT SERPL-CCNC: 17 UNIT/L (ref 0–55)
AST SERPL-CCNC: 19 UNIT/L (ref 5–34)
BASOPHILS # BLD AUTO: 0.02 X10(3)/MCL
BASOPHILS NFR BLD AUTO: 0.3 %
BILIRUBIN DIRECT+TOT PNL SERPL-MCNC: 0.3 MG/DL
BUN SERPL-MCNC: 19.7 MG/DL (ref 9.8–20.1)
CALCIUM SERPL-MCNC: 8.5 MG/DL (ref 8.4–10.2)
CHLORIDE SERPL-SCNC: 105 MMOL/L (ref 98–107)
CHOLEST SERPL-MCNC: 120 MG/DL
CHOLEST/HDLC SERPL: 3 {RATIO} (ref 0–5)
CO2 SERPL-SCNC: 30 MMOL/L (ref 23–31)
CREAT SERPL-MCNC: 1.14 MG/DL (ref 0.55–1.02)
EOSINOPHIL # BLD AUTO: 0.23 X10(3)/MCL (ref 0–0.9)
EOSINOPHIL NFR BLD AUTO: 3.2 %
ERYTHROCYTE [DISTWIDTH] IN BLOOD BY AUTOMATED COUNT: 15.2 % (ref 11.5–17)
EST. AVERAGE GLUCOSE BLD GHB EST-MCNC: 168.6 MG/DL
GFR SERPLBLD CREATININE-BSD FMLA CKD-EPI: 52 MLS/MIN/1.73/M2
GLOBULIN SER-MCNC: 3.2 GM/DL (ref 2.4–3.5)
GLUCOSE SERPL-MCNC: 101 MG/DL (ref 82–115)
HBA1C MFR BLD: 7.5 %
HCT VFR BLD AUTO: 37 % (ref 37–47)
HDLC SERPL-MCNC: 39 MG/DL (ref 35–60)
HGB BLD-MCNC: 11.6 G/DL (ref 12–16)
IMM GRANULOCYTES # BLD AUTO: 0.02 X10(3)/MCL (ref 0–0.04)
IMM GRANULOCYTES NFR BLD AUTO: 0.3 %
LDLC SERPL CALC-MCNC: 60 MG/DL (ref 50–140)
LYMPHOCYTES # BLD AUTO: 2.53 X10(3)/MCL (ref 0.6–4.6)
LYMPHOCYTES NFR BLD AUTO: 35 %
MCH RBC QN AUTO: 28.4 PG (ref 27–31)
MCHC RBC AUTO-ENTMCNC: 31.4 G/DL (ref 33–36)
MCV RBC AUTO: 90.5 FL (ref 80–94)
MONOCYTES # BLD AUTO: 0.42 X10(3)/MCL (ref 0.1–1.3)
MONOCYTES NFR BLD AUTO: 5.8 %
NEUTROPHILS # BLD AUTO: 4 X10(3)/MCL (ref 2.1–9.2)
NEUTROPHILS NFR BLD AUTO: 55.4 %
NRBC BLD AUTO-RTO: 0 %
PLATELET # BLD AUTO: 271 X10(3)/MCL (ref 130–400)
PMV BLD AUTO: 9.1 FL (ref 7.4–10.4)
POTASSIUM SERPL-SCNC: 3.8 MMOL/L (ref 3.5–5.1)
PROT SERPL-MCNC: 6.9 GM/DL (ref 5.8–7.6)
RBC # BLD AUTO: 4.09 X10(6)/MCL (ref 4.2–5.4)
SODIUM SERPL-SCNC: 142 MMOL/L (ref 136–145)
TRIGL SERPL-MCNC: 106 MG/DL (ref 37–140)
TSH SERPL-ACNC: 3.36 UIU/ML (ref 0.35–4.94)
VLDLC SERPL CALC-MCNC: 21 MG/DL
WBC # SPEC AUTO: 7.22 X10(3)/MCL (ref 4.5–11.5)

## 2023-06-23 PROCEDURE — 83036 HEMOGLOBIN GLYCOSYLATED A1C: CPT

## 2023-06-23 PROCEDURE — 85025 COMPLETE CBC W/AUTO DIFF WBC: CPT

## 2023-06-23 PROCEDURE — 80053 COMPREHEN METABOLIC PANEL: CPT

## 2023-06-23 PROCEDURE — 84443 ASSAY THYROID STIM HORMONE: CPT

## 2023-06-23 PROCEDURE — 80061 LIPID PANEL: CPT

## 2023-06-23 PROCEDURE — 36415 COLL VENOUS BLD VENIPUNCTURE: CPT

## 2023-06-26 ENCOUNTER — HOSPITAL ENCOUNTER (OUTPATIENT)
Dept: CARDIOLOGY | Facility: HOSPITAL | Age: 73
Discharge: HOME | End: 2023-06-26
Attending: NURSE PRACTITIONER
Payer: MEDICARE

## 2023-06-26 ENCOUNTER — OFFICE VISIT (OUTPATIENT)
Dept: INTERNAL MEDICINE | Facility: CLINIC | Age: 73
End: 2023-06-26
Payer: MEDICARE

## 2023-06-26 VITALS
HEIGHT: 64 IN | SYSTOLIC BLOOD PRESSURE: 129 MMHG | RESPIRATION RATE: 18 BRPM | TEMPERATURE: 98 F | WEIGHT: 192.38 LBS | HEART RATE: 71 BPM | DIASTOLIC BLOOD PRESSURE: 85 MMHG | BODY MASS INDEX: 32.84 KG/M2

## 2023-06-26 DIAGNOSIS — N18.2 STAGE 2 CHRONIC KIDNEY DISEASE DUE TO TYPE 2 DIABETES MELLITUS: Chronic | ICD-10-CM

## 2023-06-26 DIAGNOSIS — N18.2 TYPE 2 DIABETES MELLITUS WITH STAGE 2 CHRONIC KIDNEY DISEASE, WITH LONG-TERM CURRENT USE OF INSULIN: Chronic | ICD-10-CM

## 2023-06-26 DIAGNOSIS — E06.3 HASHIMOTO'S THYROIDITIS: Chronic | ICD-10-CM

## 2023-06-26 DIAGNOSIS — E11.22 STAGE 2 CHRONIC KIDNEY DISEASE DUE TO TYPE 2 DIABETES MELLITUS: Chronic | ICD-10-CM

## 2023-06-26 DIAGNOSIS — Z79.4 TYPE 2 DIABETES MELLITUS WITH STAGE 2 CHRONIC KIDNEY DISEASE, WITH LONG-TERM CURRENT USE OF INSULIN: Chronic | ICD-10-CM
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
## 2023-06-26 DIAGNOSIS — E78.00 HYPERCHOLESTEROLEMIA: Chronic | ICD-10-CM

## 2023-06-26 DIAGNOSIS — E11.22 TYPE 2 DIABETES MELLITUS WITH STAGE 2 CHRONIC KIDNEY DISEASE, WITH LONG-TERM CURRENT USE OF INSULIN: Chronic | ICD-10-CM

## 2023-06-26 DIAGNOSIS — F32.0 CURRENT MILD EPISODE OF MAJOR DEPRESSIVE DISORDER, UNSPECIFIED WHETHER RECURRENT: Chronic | ICD-10-CM

## 2023-06-26 DIAGNOSIS — I10 ESSENTIAL HYPERTENSION: Chronic | ICD-10-CM

## 2023-06-26 DIAGNOSIS — E66.09 CLASS 1 OBESITY DUE TO EXCESS CALORIES WITH SERIOUS COMORBIDITY AND BODY MASS INDEX (BMI) OF 32.0 TO 32.9 IN ADULT: Chronic | ICD-10-CM

## 2023-06-26 PROCEDURE — 3079F PR MOST RECENT DIASTOLIC BLOOD PRESSURE 80-89 MM HG: ICD-10-PCS | Mod: CPTII,,, | Performed by: NURSE PRACTITIONER

## 2023-06-26 PROCEDURE — 1125F PR PAIN SEVERITY QUANTIFIED, PAIN PRESENT: ICD-10-PCS | Mod: CPTII,,, | Performed by: NURSE PRACTITIONER

## 2023-06-26 PROCEDURE — 3008F BODY MASS INDEX DOCD: CPT | Mod: CPTII,,, | Performed by: NURSE PRACTITIONER

## 2023-06-26 PROCEDURE — 99214 PR OFFICE/OUTPT VISIT, EST, LEVL IV, 30-39 MIN: ICD-10-PCS | Mod: S$PBB,,, | Performed by: NURSE PRACTITIONER

## 2023-06-26 PROCEDURE — 3072F LOW RISK FOR RETINOPATHY: CPT | Mod: CPTII,,, | Performed by: NURSE PRACTITIONER

## 2023-06-26 PROCEDURE — 3074F SYST BP LT 130 MM HG: CPT | Mod: CPTII,,, | Performed by: NURSE PRACTITIONER

## 2023-06-26 PROCEDURE — 1160F RVW MEDS BY RX/DR IN RCRD: CPT | Mod: CPTII,,, | Performed by: NURSE PRACTITIONER

## 2023-06-26 PROCEDURE — 3074F PR MOST RECENT SYSTOLIC BLOOD PRESSURE < 130 MM HG: ICD-10-PCS | Mod: CPTII,,, | Performed by: NURSE PRACTITIONER

## 2023-06-26 PROCEDURE — 3288F FALL RISK ASSESSMENT DOCD: CPT | Mod: CPTII,,, | Performed by: NURSE PRACTITIONER

## 2023-06-26 PROCEDURE — 1100F PR PT FALLS ASSESS DOC 2+ FALLS/FALL W/INJURY/YR: ICD-10-PCS | Mod: CPTII,,, | Performed by: NURSE PRACTITIONER

## 2023-06-26 PROCEDURE — 1159F MED LIST DOCD IN RCRD: CPT | Mod: CPTII,,, | Performed by: NURSE PRACTITIONER

## 2023-06-26 PROCEDURE — 3288F PR FALLS RISK ASSESSMENT DOCUMENTED: ICD-10-PCS | Mod: CPTII,,, | Performed by: NURSE PRACTITIONER

## 2023-06-26 PROCEDURE — 1125F AMNT PAIN NOTED PAIN PRSNT: CPT | Mod: CPTII,,, | Performed by: NURSE PRACTITIONER

## 2023-06-26 PROCEDURE — 1159F PR MEDICATION LIST DOCUMENTED IN MEDICAL RECORD: ICD-10-PCS | Mod: CPTII,,, | Performed by: NURSE PRACTITIONER

## 2023-06-26 PROCEDURE — 93005 ELECTROCARDIOGRAM TRACING: CPT

## 2023-06-26 PROCEDURE — 1160F PR REVIEW ALL MEDS BY PRESCRIBER/CLIN PHARMACIST DOCUMENTED: ICD-10-PCS | Mod: CPTII,,, | Performed by: NURSE PRACTITIONER

## 2023-06-26 PROCEDURE — 1100F PTFALLS ASSESS-DOCD GE2>/YR: CPT | Mod: CPTII,,, | Performed by: NURSE PRACTITIONER

## 2023-06-26 PROCEDURE — 99215 OFFICE O/P EST HI 40 MIN: CPT | Mod: PBBFAC | Performed by: NURSE PRACTITIONER

## 2023-06-26 PROCEDURE — 3079F DIAST BP 80-89 MM HG: CPT | Mod: CPTII,,, | Performed by: NURSE PRACTITIONER

## 2023-06-26 PROCEDURE — 3066F PR DOCUMENTATION OF TREATMENT FOR NEPHROPATHY: ICD-10-PCS | Mod: CPTII,,, | Performed by: NURSE PRACTITIONER

## 2023-06-26 PROCEDURE — 4010F ACE/ARB THERAPY RXD/TAKEN: CPT | Mod: CPTII,,, | Performed by: NURSE PRACTITIONER

## 2023-06-26 PROCEDURE — 3072F PR LOW RISK FOR RETINOPATHY: ICD-10-PCS | Mod: CPTII,,, | Performed by: NURSE PRACTITIONER

## 2023-06-26 PROCEDURE — 99214 OFFICE O/P EST MOD 30 MIN: CPT | Mod: S$PBB,,, | Performed by: NURSE PRACTITIONER

## 2023-06-26 PROCEDURE — 3008F PR BODY MASS INDEX (BMI) DOCUMENTED: ICD-10-PCS | Mod: CPTII,,, | Performed by: NURSE PRACTITIONER

## 2023-06-26 PROCEDURE — 4010F PR ACE/ARB THEARPY RXD/TAKEN: ICD-10-PCS | Mod: CPTII,,, | Performed by: NURSE PRACTITIONER

## 2023-06-26 PROCEDURE — 3066F NEPHROPATHY DOC TX: CPT | Mod: CPTII,,, | Performed by: NURSE PRACTITIONER

## 2023-06-26 RX ORDER — INSULIN GLARGINE 100 [IU]/ML
26 INJECTION, SOLUTION SUBCUTANEOUS DAILY
Qty: 23.4 ML | Refills: 1 | Status: SHIPPED | OUTPATIENT
Start: 2023-06-26 | End: 2023-08-18 | Stop reason: SDUPTHER

## 2023-06-26 NOTE — PROGRESS NOTES
Patient ID: [id number]     Chief Complaint: Follow-up (Falling started in [place] fell [place] with no knowledge of falling. Also on 06/24/23) and Fall    HPI:     Marnie Briggs is a 72 y.o. female with diagnosis of HTN, HLD, DM2, CKD 3a, Hashimoto's, Obesity. Patient seen in clinic today for falls. Patient last seen in clinic on 02/24/2023.   At previous appointment, Actos increased to 45 mg daily and continued on Metformin 1,000 mg bid, Lantus 30 units daily for DM2. Prevous A1c 8.2; current A1c 7.2. Patient states taking medication as prescribed, tolerating well. Patient states she checks her CBGs at home, lowest CBG 75.   Patient states she has had 2 falls recently. States doesn't remember the fall but awoke on the ground. Patient states headaches at times with dizziness. Patient denies chest pain, SOB. Patient denies any other acute complaints.   Patient recently completed physical therapy due to low back pain. States still has pain at times. Patient currently prescribed Gabapentin 600 mg bid and Flexeril 10 mg Qhs. Patient refuses Orthopedic/Neurosurgery referral. Patient denies bilateral sciatica, saddle anesthesia, bowel/bladder incontinence.  Patient continued on Lisinopril 10 mg daily and Coreg 12.5 mg bid for HTN. Patient states taking medication as prescribed, tolerating well. Patient states she has a B/P monitor at home but does  not check her B/P at home.  Patient continued on Atorvastatin 80 mg daily for HLD. Patient states taking medication as prescribed, tolerating well.     Patient followed by Ophthalmology Clinic. Last appointment on 05/12/2023. Dx: T2DM without ophthalmologic manifestations, ERM with foveoschisis OS, Pseudophakia OU. Patient has follow up appointment scheduled for 07/21/2023.     Patient followed by Mental Health Provider, Angie Mims NP. Last appointment on 02/16/2023. No medication changes needed. FU in 6 months. Patient has follow up appointment scheduled for  08/16/2023.     Patient followed by Nephrology Clinic. Last appointment on 02/10/2023. CKD Stage II (GFR >60): BUN/Cr 19.7/0.98, eGFR improved to > 60 was 58 at last visit; Urine protein/creatinine ratio WNL; H&H 11.5/36.9, MCV 90.6 normocytic anemia; Vit D 27.2, intact PTH elevated 128, calcium 8.8; Dexa-scan form 11/18/22 WNL. Diabetes Mellitus type II: A1c 8.2, stable but above goal of 7; Continue on diabetic regimen per PCP; Dducated on healthy diet and exercise. HTN: /88 mmHg at goal; Continue on Lisinopril and coreg; Educated on DASH diet and exercise. HLD: Continue to monitor, LDL at goal <70; Last LDL 55. Obesity: Encouraged healthy low fat diet and exercise; Continue to monitor. Return to clinic in 6 months with labs. Patient has follow up appointment scheduled for 08/11/2023.      Patient followed by GYN clinic. Last appointment on 09/27/2022. Follow up in about 1 year (around 9/27/2023) for annual exam. Patient has follow up appointment scheduled for 09/28/2023.      Patient followed by Endocrinology Clinic for DM2. Last appointment on 05/03/2021. History of uncontrolled type 2 diabetes with neuropathy, hypertension, hyperlipidemia, vitamin D deficiency. Type 2 diabetes current A1c 6.7 previous 6.6, 10.2 and 10.4. Patient has had improved A1c and type 2 diabetes is now controlled. CBGs patient checks 2-3 times per day ranges  pt changes her inuslin to 20-30 units leading to hypoglycemia, reeducated on not changing basal insulin dosing. Patient has occasional hypoglycemia in the 70s is when patient gives her higher doses of basal insulin at night. Hypertension at goal today, hyperlipidemia Total 111 HDL 38 triglycerides 115 LDL 50 at goal per guidelines patient is currently on a statin. Vitamin D deficiency current level 28.4 patient is to start over-the-counter vitamin D3 2000 IUs daily. Hashimoto's recently diagnosed TSH 5.7204 Free T4 0.86 On previous labs on 12/21/2020 TPO positive TPO  quantitative 1130. Patient denies fatigue or weight gain. We will continue to monitor repeat on follow-up visit. Medications: continue Metformin 500 mg BID, Actos 30mg. Change Lantus to 25 units daily. Patient was to follow up in 6 months but missed appointment.     Review of patient's allergies indicates:  No Known Allergies    Breast Cancer Screening: MMG negative on 11/23/2022  Cervical Cancer Screening: deferred due to age  Colorectal Cancer Screening: Cologuard negative on 12/09/2022  Diabetic Eye Exam: 05/12/2023  Diabetic Foot Exam: 11/18/2022  Lung Cancer Screening: N/A  Prostate Cancer Screening: N/A  AAA Screening: N/A  Osteoporosis Screening: normal on 12/01/2022  Medicare Wellness: ordered  Immunizations:   Immunization History   Administered Date(s) Administered    COVID-19 Vaccine 01/03/2022, 07/28/2022    COVID-19, MRNA, LN-S, PF (MODERNA FULL 0.5 ML DOSE) 03/19/2021, 04/16/2021    COVID-19, mRNA, LNP-S, bivalent booster, PF (Moderna Omicron) 04/19/2023    Influenza 10/08/2012, 10/11/2013, 11/17/2014    Influenza (FLUAD) - Quadrivalent - Adjuvanted - PF *Preferred* (65+) 11/18/2022    Influenza (FLUBLOK) - Quadrivalent - Recombinant - PF *Preferred* (egg allergy) 10/21/2019    Influenza - High Dose - PF (65 years and older) 10/24/2018    Influenza - Quadrivalent - High Dose - PF (65 years and older) 12/07/2020, 11/30/2021    Influenza - Trivalent - PF (ADULT) 12/15/2015, 10/07/2016, 10/03/2017, 11/14/2017    Influenza A (H1N1) 2009 Monovalent - IM 12/09/2009    Pneumococcal Polysaccharide - 23 Valent 12/10/2021    Zoster Recombinant 06/07/2021, 11/30/2021     Past Surgical History:   Procedure Laterality Date    ANKLE FUSION      CHOLECYSTECTOMY      COLONOSCOPY  11/30/2015    EYE SURGERY      HYSTERECTOMY      NECK SURGERY      stint      Right kidney     family history includes Cancer in her brother; Diabetes in her brother, father, and mother; Heart disease in her father; Hypertension in her  "father; Stroke in her mother.    Social History     Socioeconomic History    Marital status:    Tobacco Use    Smoking status: Never     Passive exposure: Never    Smokeless tobacco: Never   Substance and Sexual Activity    Alcohol use: Never    Drug use: Never    Sexual activity: Not Currently     Current Outpatient Medications   Medication Instructions    aspirin 81 mg, Oral, Daily    atorvastatin (LIPITOR) 80 mg, Oral, Daily    carvediloL (COREG) 12.5 mg, Oral, 2 times daily    cyclobenzaprine (FLEXERIL) 10 mg, Oral, Nightly    dicyclomine (BENTYL) 20 mg, Oral, Every 6 hours    DULoxetine (CYMBALTA) 30 mg, Oral, 2 times daily    gabapentin (NEURONTIN) 600 mg, Oral, 2 times daily    LANTUS SOLOSTAR U-100 INSULIN 26 Units, Subcutaneous, Daily    lisinopriL 10 mg, Oral, Daily    loratadine (CLARITIN) 10 mg, Oral, Daily    metFORMIN (GLUCOPHAGE) 1,000 mg, Oral, 2 times daily with meals    mirtazapine (REMERON) 15 mg, Oral, Nightly    multivitamin capsule 1 capsule, Oral, Daily    omeprazole (PRILOSEC) 40 mg, Oral, Daily    pen needle, diabetic 29 gauge x 1/2" Ndle   Insulin pen needles, See Instructions, Inuslin pen needles for once a day Lantus injection  E11.65, # 90 EA, 3 Refill(s), Pharmacy: [id number], 162, cm, Height/Length Dosing, 12/10/21 9:34:00 CST, 90.2, kg, Weight Dosing, 12/10/21 9:34:00 CST    pioglitazone (ACTOS) 45 mg, Oral, Daily    vitamin D (VITAMIN D3) 1,000 Units, Oral, Daily       Subjective:     Review of Systems   Constitutional: Negative.    HENT: Negative.     Eyes: Negative.    Respiratory: Negative.     Cardiovascular: Negative.    Gastrointestinal: Negative.    Endocrine: Negative.    Genitourinary: Negative.    Musculoskeletal: Negative.    Skin: Negative.    Allergic/Immunologic: Negative.    Neurological:  Positive for dizziness and headaches.   Hematological: Negative.    Psychiatric/Behavioral: Negative.       Objective:     Visit Vitals  /85 (BP Location: " "Left arm, Patient Position: Sitting, BP Method: Large (Automatic))   Pulse 71   Temp 98.2 °F (36.8 °C) (Oral)   Resp 18   Ht 5' 4.02" (1.626 m)   Wt 87.3 kg (192 lb 6.4 oz)   BMI 33.01 kg/m²       Physical Exam  Vitals reviewed.   Constitutional:       Appearance: Normal appearance.   HENT:      Head: Normocephalic and atraumatic.      Mouth/Throat:      Mouth: Mucous membranes are moist.      Pharynx: Oropharynx is clear.   Eyes:      Extraocular Movements: Extraocular movements intact.      Conjunctiva/sclera: Conjunctivae normal.      Pupils: Pupils are equal, round, and reactive to light.   Cardiovascular:      Rate and Rhythm: Normal rate and regular rhythm.      Heart sounds: Normal heart sounds.   Pulmonary:      Effort: Pulmonary effort is normal.      Breath sounds: Normal breath sounds.   Abdominal:      General: Bowel sounds are normal.   Musculoskeletal:         General: Normal range of motion.      Cervical back: Normal range of motion.   Skin:     General: Skin is warm and dry.   Neurological:      General: No focal deficit present.      Mental Status: She is alert and oriented to person, place, and time.      Sensory: No sensory deficit.      Motor: No weakness.      Gait: Gait is intact.   Psychiatric:         Mood and Affect: Mood normal.         Behavior: Behavior normal.       Labs Reviewed:     Hematology:  Lab Results   Component Value Date    WBC 7.22 06/23/2023    HGB 11.6 (L) 06/23/2023    HCT 37.0 06/23/2023     06/23/2023     Chemistry:  Lab Results   Component Value Date     06/23/2023    K 3.8 06/23/2023    CHLORIDE 105 06/23/2023    BUN 19.7 06/23/2023    CREATININE 1.14 (H) 06/23/2023    EGFRNORACEVR 52 06/23/2023    GLUCOSE 101 06/23/2023    CALCIUM 8.5 06/23/2023    ALKPHOS 67 06/23/2023    LABPROT 6.9 06/23/2023    ALBUMIN 3.7 06/23/2023    BILIDIR 0.3 03/22/2022    IBILI 0.30 03/22/2022    AST 19 06/23/2023    ALT 17 06/23/2023    MG 1.60 09/08/2021    PHOS 4.1 " 09/08/2021    [id number] 27.2 (L) 02/06/2023      Lab Results   Component Value Date    HGBA1C 7.5 (H) 06/23/2023      Lipid Panel:  Lab Results   Component Value Date    CHOL 120 06/23/2023    HDL 39 06/23/2023    LDL 60.00 06/23/2023    TRIG 106 06/23/2023    TOTALCHOLEST 3 06/23/2023      Thyroid:  Lab Results   Component Value Date    TSH 3.363 06/23/2023      Urine:  Lab Results   Component Value Date    COLORUA Light-Yellow (A) 05/19/2022    APPEARANCEUA Clear 05/19/2022    SGUA 1.013 05/19/2022    PHUA 6.5 05/19/2022    PROTEINUA Negative 05/19/2022    GLUCOSEUA Normal 05/19/2022    KETONESUA Negative 05/19/2022    BLOODUA Negative 05/19/2022    NITRITESUA Negative 05/19/2022    LEUKOCYTESUR 75 05/19/2022    RBCUA 0-5 05/19/2022    WBCUA 0-5 05/19/2022    BACTERIA None Seen 05/19/2022    SQEPUA Occ (A) 05/19/2022    HYALINECASTS None Seen 05/19/2022    CREATRANDUR 74.7 02/06/2023    PROTEINURINE 7.9 02/06/2023    UPROTCREA 0.1 02/06/2023        Assessment:       ICD-10-CM ICD-9-CM   1. Fall, initial encounter  W19.XXXA E888.9   2. Type 2 diabetes mellitus with stage 2 chronic kidney disease, with long-term current use of insulin  E11.22 250.40    N18.2 585.2    Z79.4 V58.67   3. Essential hypertension  I10 401.9   4. Hashimoto's thyroiditis  E06.3 245.2   5. Stage 2 chronic kidney disease due to type 2 diabetes mellitus  E11.22 250.40    N18.2 585.2   6. Hypercholesterolemia  E78.00 272.0   7. Class 1 obesity due to excess calories with serious comorbidity and body mass index (BMI) of 32.0 to 32.9 in adult  E66.09 278.00    Z68.32 V85.32   8. Current mild episode of major depressive disorder, unspecified whether recurrent  F32.0 296.21        Plan:     1. Fall, initial encounter  EKG ordered  Labs completed, reviewed  Patient recently completed physical therapy for back pain  Referral to Geriatric clinic to assess falls  - SCHEDULED EKG 12-LEAD (to Muse); Future  - Ambulatory referral/consult to Family  Practice; Future    2. Type 2 diabetes mellitus with stage 2 chronic kidney disease, with long-term current use of insulin  Continue Metformin 1,000 mg bid, Actos 45 mg daily  Decrease Lantus to 26 units daily  Encouraged home CBG monitoring.  Hypoglycemic episodes: lowest 75  Body mass index is 33.01 kg/m².  Hemoglobin A1c   Date Value Ref Range Status   06/23/2023 7.5 (H) <=7.0 % Final     Results for orders placed or performed in visit on 05/19/22   Microalbumin/Creatinine Ratio, Urine   Result Value Ref Range    Urine Microalbumin <5.0 <=30.0 ug/ml    Urine Creatinine 99.9 47.0 - 110.0 mg/dL    Microalbumin Creatinine Ratio <5.0 0.0 - 30.0 mg/gm Cr   On Atorvastatin  On Lisinopril 10 mg daily  Weight Loss Encouraged  ADA Diet  - CBC Auto Differential; Future  - Comprehensive Metabolic Panel; Future  - Hemoglobin A1C; Future  - Urinalysis; Future  - Microalbumin/Creatinine Ratio, Urine; Future  - Urinalysis    3. Essential hypertension  Vitals:    06/26/23 0739   BP: 129/85   Pulse: 71   Resp: 18   Temp: 98.2 °F (36.8 °C)      Results for orders placed or performed in visit on 05/19/22   Microalbumin/Creatinine Ratio, Urine   Result Value Ref Range    Urine Microalbumin <5.0 <=30.0 ug/ml    Urine Creatinine 99.9 47.0 - 110.0 mg/dL    Microalbumin Creatinine Ratio <5.0 0.0 - 30.0 mg/gm Cr   EKG: none noted, ordered  Continue Carvedilol 12.5 mg bid, Lisinopril 10 mg daily  DASH diet  Encouraged home blood pressure monitoring    4. Hashimoto's thyroiditis  TSH level: 3.3  Endocrinology appt 04/29/2024  - TSH; Future    5. Stage 2 chronic kidney disease due to type 2 diabetes mellitus  Followed by Nephrology Clinic   Latest Reference Range & Units 06/23/23 06:56   BUN 9.8 - 20.1 mg/dL 19.7   Creatinine 0.55 - 1.02 mg/dL 1.14 (H)   eGFR mls/min/1.73/m2 52     6. Hypercholesterolemia  Continue Atorvastatin 80 mg daily  Weight loss encouraged  Low fat/high fiber diet  Increase physical activity  Cholesterol Total   Date  Value Ref Range Status   06/23/2023 120 <=200 mg/dL Final     HDL Cholesterol   Date Value Ref Range Status   06/23/2023 39 35 - 60 mg/dL Final     Triglyceride   Date Value Ref Range Status   06/23/2023 106 37 - 140 mg/dL Final     LDL Cholesterol   Date Value Ref Range Status   06/23/2023 60.00 50.00 - 140.00 mg/dL Final   - Lipid Panel; Future    7. Class 1 obesity due to excess calories with serious comorbidity and body mass index (BMI) of 32.0 to 32.9 in adult  Body mass index is 33.01 kg/m².  Thyroid Stimulating Hormone   Date Value Ref Range Status   06/23/2023 3.363 0.350 - 4.940 uIU/mL Final     Vit D 25 OH   Date Value Ref Range Status   02/06/2023 27.2 (L) 30.0 - 80.0 ng/mL Final     Hemoglobin A1c   Date Value Ref Range Status   06/23/2023 7.5 (H) <=7.0 % Final   Sleep Study: none noted  Weight Loss Encouraged  Increase Physical Activity    8. Current mild episode of major depressive disorder, unspecified whether recurrent  Followed by Mental Health Provider      Follow up in about 6 weeks (around 8/7/2023) for falls, ekg. In addition to their scheduled follow up, the patient has also been instructed to follow up on as needed basis.     LATOYA Carias

## 2023-07-05 DIAGNOSIS — R94.31 ABNORMAL EKG: Primary | ICD-10-CM

## 2023-07-05 NOTE — PROGRESS NOTES
Contacted informed of cardiology referral due to abnormal EKG and falling.She voiced understanding.

## 2023-07-12 ENCOUNTER — OFFICE VISIT (OUTPATIENT)
Dept: FAMILY MEDICINE | Facility: CLINIC | Age: 73
End: 2023-07-12
Payer: MEDICARE

## 2023-07-12 VITALS
HEART RATE: 76 BPM | SYSTOLIC BLOOD PRESSURE: 102 MMHG | WEIGHT: 188.69 LBS | TEMPERATURE: 98 F | HEIGHT: 64 IN | BODY MASS INDEX: 32.21 KG/M2 | DIASTOLIC BLOOD PRESSURE: 69 MMHG | OXYGEN SATURATION: 97 % | RESPIRATION RATE: 20 BRPM
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
## 2023-07-12 DIAGNOSIS — Z79.899 POLYPHARMACY: ICD-10-CM

## 2023-07-12 DIAGNOSIS — F32.0 CURRENT MILD EPISODE OF MAJOR DEPRESSIVE DISORDER, UNSPECIFIED WHETHER RECURRENT: Chronic | ICD-10-CM

## 2023-07-12 DIAGNOSIS — G47.00 INSOMNIA, UNSPECIFIED TYPE: ICD-10-CM

## 2023-07-12 DIAGNOSIS — Z79.4 TYPE 2 DIABETES MELLITUS WITH STAGE 2 CHRONIC KIDNEY DISEASE, WITH LONG-TERM CURRENT USE OF INSULIN: Chronic | ICD-10-CM

## 2023-07-12 DIAGNOSIS — E11.22 TYPE 2 DIABETES MELLITUS WITH STAGE 2 CHRONIC KIDNEY DISEASE, WITH LONG-TERM CURRENT USE OF INSULIN: Chronic | ICD-10-CM

## 2023-07-12 DIAGNOSIS — F41.9 ANXIETY: ICD-10-CM

## 2023-07-12 DIAGNOSIS — M54.41 CHRONIC MIDLINE LOW BACK PAIN WITH RIGHT-SIDED SCIATICA: Chronic | ICD-10-CM

## 2023-07-12 DIAGNOSIS — M54.41 CHRONIC RIGHT-SIDED LOW BACK PAIN WITH RIGHT-SIDED SCIATICA: Chronic | ICD-10-CM

## 2023-07-12 DIAGNOSIS — G89.29 CHRONIC RIGHT-SIDED LOW BACK PAIN WITH RIGHT-SIDED SCIATICA: Chronic | ICD-10-CM

## 2023-07-12 DIAGNOSIS — N18.2 TYPE 2 DIABETES MELLITUS WITH STAGE 2 CHRONIC KIDNEY DISEASE, WITH LONG-TERM CURRENT USE OF INSULIN: Chronic | ICD-10-CM

## 2023-07-12 DIAGNOSIS — G89.29 CHRONIC MIDLINE LOW BACK PAIN WITH RIGHT-SIDED SCIATICA: Chronic | ICD-10-CM

## 2023-07-12 DIAGNOSIS — I10 ESSENTIAL HYPERTENSION: Primary | Chronic | ICD-10-CM

## 2023-07-12 LAB
ANION GAP SERPL CALC-SCNC: 10 MEQ/L
BUN SERPL-MCNC: 23.6 MG/DL (ref 9.8–20.1)
CALCIUM SERPL-MCNC: 9.1 MG/DL (ref 8.4–10.2)
CHLORIDE SERPL-SCNC: 103 MMOL/L (ref 98–107)
CO2 SERPL-SCNC: 29 MMOL/L (ref 23–31)
CREAT SERPL-MCNC: 1.25 MG/DL (ref 0.55–1.02)
CREAT/UREA NIT SERPL: 19
GFR SERPLBLD CREATININE-BSD FMLA CKD-EPI: 46 MLS/MIN/1.73/M2
GLUCOSE SERPL-MCNC: 97 MG/DL (ref 82–115)
POTASSIUM SERPL-SCNC: 4.4 MMOL/L (ref 3.5–5.1)
SODIUM SERPL-SCNC: 142 MMOL/L (ref 136–145)

## 2023-07-12 PROCEDURE — 36415 COLL VENOUS BLD VENIPUNCTURE: CPT | Performed by: FAMILY MEDICINE

## 2023-07-12 PROCEDURE — 80048 BASIC METABOLIC PNL TOTAL CA: CPT | Performed by: FAMILY MEDICINE

## 2023-07-12 PROCEDURE — 99215 OFFICE O/P EST HI 40 MIN: CPT | Mod: PBBFAC | Performed by: FAMILY MEDICINE

## 2023-07-12 RX ORDER — MIRTAZAPINE 15 MG/1
7.5 TABLET, FILM COATED ORAL NIGHTLY
Qty: 90 TABLET | Refills: 1
Start: 2023-07-12 | End: 2023-09-13

## 2023-07-12 RX ORDER — GABAPENTIN 600 MG/1
600 TABLET ORAL NIGHTLY
Qty: 180 TABLET | Refills: 1
Start: 2023-07-12 | End: 2023-12-15
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
## 2023-07-12 NOTE — PATIENT INSTRUCTIONS
STOP DICYCLOMINE / BENTYL     STOP PIOGLITAZONE / ACTOS   KEEP GLUCOSE LOG - CHECK ONCE IN THE MORNING BEFORE EATING ANYTHING, AND THEN ONCE IN THE EVENING APPROX 2 HOURS AFTER YOUR DINNER.  WRITE DOWN THE DATES WITH THE LOGS AND BRING THE PAPER TO YOUR NEXT APPOINTMENT.     DECREASE MIRTAZAPINE / REMERON TO 1/2 TABLET (7.5MG)  FOR 2 WEEKS AND MONITOR HOW YOUR SLEEP AND MOOD FEEL.  IF YOU FEEL THE SAME / OKAY - THEN STOP IT COMPLETELY AFTER 2 WEEKS AND CONTINUE TO SEE HOW YOUR SLEEP AND MOOD FEEL.     DECREASE GABAPENTIN 600MG TO JUST ONCE AT NIGHT.  SEE IF ANY CHANGES IN YOUR PAIN.    NEXT TIME YOU GO TO YOUR PCP OR PSYCHIATRIST - ASK ANY PARTICULAR REASON WHY THE DULOXETINE/CYMBALTA IS TWICE A DAY - IF IT IS OKAY FOR YOU TO GO TO ONCE A DAY    BE CAREFUL AND SLOW WHEN YOU GET UP FROM BED IN THE MORNING, OR WHENEVER YOU STAND UP FROM SITTING FOR A LONG TIME - LIKE ON THE COUCH OR TOILET.  HOLD ONTO SOMETHING AND STAND FOR A MINUTE BEFORE WALKING, TO MAKE SURE YOU ARE NOT DIZZY OR LIGHTHEADED.     WRITE DOWN A JOURNAL ANY TIME YOU DO FEEL DIZZY OR LIGHTHEADED - WHAT DAY AND TIME IT IS, WHAT YOU WERE DOING, AND CHECK YOUR SUGAR AND BLOOD PRESSURE IF YOU ARE AT HOME.     FOR SUGARS - IF IT IS GREATER THAN 250 MORE THAN TWICE , LET US KNOW - WE NEED TO ADJUST YOUR INSULIN  IF IT IS LESS THAN 80 - DRINK SOME JUICE OR EAT A CANDY AND RECHECK IN 30 MINUTES TO MAKE SURE IT GOES UP.   IF IT KEEPS LOW MULTIPLE TIMES, ALSO LET US KNOW.     FOR BLOOD PRESSURE - CHECK 2 TIMES A WEEK (Monday AND Thursday) - 3-4 HOURS AFTER YOU TAKE YOUR BP MEDS IN THE MORNING.  IF TOP NUMBER IS HIGHER THAN 150 MULTIPLE TIMES, OR LESS THAN 100 MULTIPLE TIMES, LET US KNOW.

## 2023-07-21 ENCOUNTER — OFFICE VISIT (OUTPATIENT)
Dept: OPHTHALMOLOGY | Facility: CLINIC | Age: 73
End: 2023-07-21
Payer: MEDICARE

## 2023-07-21 VITALS — BODY MASS INDEX: 32.1 KG/M2 | WEIGHT: 188 LBS | HEIGHT: 64 IN

## 2023-07-21 DIAGNOSIS — H35.372 EPIRETINAL MEMBRANE (ERM) OF LEFT EYE: Primary | ICD-10-CM

## 2023-07-21 DIAGNOSIS — H33.102 RETINOSCHISIS OF LEFT EYE: ICD-10-CM

## 2023-07-21 PROCEDURE — 99213 OFFICE O/P EST LOW 20 MIN: CPT | Mod: PBBFAC,PO | Performed by: STUDENT IN AN ORGANIZED HEALTH CARE EDUCATION/TRAINING PROGRAM

## 2023-07-21 PROCEDURE — 92134 CPTRZ OPH DX IMG PST SGM RTA: CPT | Mod: PBBFAC,PO | Performed by: STUDENT IN AN ORGANIZED HEALTH CARE EDUCATION/TRAINING PROGRAM

## 2023-07-21 NOTE — PROGRESS NOTES
OCT mac 11/21/22  OD: , intact foveal contour that appears excavated as previously noted, but no IRF/SRF  OS: , temporal ERM parafoveally with schisis of foveal contour and parafoveal IRF    OCT mac 7/2023  OD: , normal/slightly excavated foveal contour  OS: , foveoschisis with parafoveal IRF    Assessment /Plan     1. T2DM without ophthalmologic manifestations  - Last A1c as below:  Hemoglobin A1c   Date Value Ref Range Status   06/23/2023 7.5 (H) <=7.0 % Final   02/06/2023 8.2 (H) <=7.0 % Final   05/19/2022 8.0 (H) <=7.0 % Final   - No signs of retinopathy on exam  - No signs of DME on OCT mac  - Annual fundus photo obtained 11/21/22  - Encouraged good BS/BP/Cholesterol control, f/u with PCP regularly  - Monitor with annual DFE (next due 11/2023)    2. ERM with foveoschisis OS  - seen with Dr. Martin 11/19/21: believes this is ERM with foveoschisis rather than a partial thickness hole. No treatment indicated for now as vision good and patient having no symptoms  - 5/12/23 with hyperopic shift likely exacerbation of macular pathology. RTC June-July to see Dr. Martin and discuss possible need for PPV/EMP  - 7/21/23: OCT stable. VA 20/25-2. Discussed RBA of surgery. Will defer for now as VA good.     3. Pseudophakia OU  4. PCO, non-visually significant, right eye  - NVS, given repeat MRX today but retinal etiology    RTC 4 mo DFE

## 2023-07-24 ENCOUNTER — HOSPITAL ENCOUNTER (OUTPATIENT)
Dept: CARDIOLOGY | Facility: HOSPITAL | Age: 73
Discharge: HOME | End: 2023-07-24
Attending: STUDENT IN AN ORGANIZED HEALTH CARE EDUCATION/TRAINING PROGRAM
Payer: MEDICARE

## 2023-07-24 DIAGNOSIS — N18.2 TYPE 2 DIABETES MELLITUS WITH STAGE 2 CHRONIC KIDNEY DISEASE, WITH LONG-TERM CURRENT USE OF INSULIN: ICD-10-CM

## 2023-07-24 DIAGNOSIS — E11.22 TYPE 2 DIABETES MELLITUS WITH STAGE 2 CHRONIC KIDNEY DISEASE, WITH LONG-TERM CURRENT USE OF INSULIN: ICD-10-CM

## 2023-07-24 DIAGNOSIS — Z79.4 TYPE 2 DIABETES MELLITUS WITH STAGE 2 CHRONIC KIDNEY DISEASE, WITH LONG-TERM CURRENT USE OF INSULIN: ICD-10-CM
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
## 2023-07-24 DIAGNOSIS — I10 ESSENTIAL HYPERTENSION: ICD-10-CM

## 2023-07-24 LAB
AV INDEX (PROSTH): 0.78
AV MEAN GRADIENT: 3 MMHG
AV PEAK GRADIENT: 5 MMHG
AV VALVE AREA: 2.45 CM2
AV VELOCITY RATIO: 0.82
CV ECHO LV RWT: 0.53 CM
DOP CALC AO PEAK VEL: 1.13 M/S
DOP CALC AO VTI: 21.9 CM
DOP CALC LVOT AREA: 3.1 CM2
DOP CALC LVOT DIAMETER: 2 CM
DOP CALC LVOT PEAK VEL: 0.93 M/S
DOP CALC LVOT STROKE VOLUME: 53.69 CM3
DOP CALC MV VTI: 25.8 CM
DOP CALCLVOT PEAK VEL VTI: 17.1 CM
E WAVE DECELERATION TIME: 219 MSEC
E/A RATIO: 0.85
E/E' RATIO: 9.26 M/S
ECHO LV POSTERIOR WALL: 1.09 CM (ref 0.6–1.1)
EJECTION FRACTION: 59 %
FRACTIONAL SHORTENING: 25 % (ref 28–44)
INTERVENTRICULAR SEPTUM: 0.88 CM (ref 0.6–1.1)
LEFT ATRIUM SIZE: 3.5 CM
LEFT ATRIUM VOLUME MOD: 35.2 CM3
LEFT INTERNAL DIMENSION IN SYSTOLE: 3.05 CM (ref 2.1–4)
LEFT VENTRICLE DIASTOLIC VOLUME: 73.8 ML
LEFT VENTRICLE SYSTOLIC VOLUME: 36.4 ML
LEFT VENTRICULAR INTERNAL DIMENSION IN DIASTOLE: 4.09 CM (ref 3.5–6)
LEFT VENTRICULAR MASS: 128.83 G
LV LATERAL E/E' RATIO: 8 M/S
LV SEPTAL E/E' RATIO: 11 M/S
LVOT MG: 2 MMHG
LVOT MV: 0.65 CM/S
MV MEAN GRADIENT: 3 MMHG
MV PEAK A VEL: 1.04 M/S
MV PEAK E VEL: 0.88 M/S
MV PEAK GRADIENT: 5 MMHG
MV STENOSIS PRESSURE HALF TIME: 58 MS
MV VALVE AREA BY CONTINUITY EQUATION: 2.08 CM2
MV VALVE AREA P 1/2 METHOD: 3.79 CM2
OHS LV EJECTION FRACTION SIMPSONS BIPLANE MOD: 6 %
RA PRESSURE: 3 MMHG
SINUS: 3 CM
TDI LATERAL: 0.11 M/S
TDI SEPTAL: 0.08 M/S
TDI: 0.1 M/S
TRICUSPID ANNULAR PLANE SYSTOLIC EXCURSION: 1.92 CM

## 2023-07-24 PROCEDURE — 93306 ECHO (CUPID ONLY): ICD-10-PCS | Mod: 26,,, | Performed by: STUDENT IN AN ORGANIZED HEALTH CARE EDUCATION/TRAINING PROGRAM

## 2023-07-24 PROCEDURE — 93306 TTE W/DOPPLER COMPLETE: CPT | Mod: 26,,, | Performed by: STUDENT IN AN ORGANIZED HEALTH CARE EDUCATION/TRAINING PROGRAM

## 2023-07-24 PROCEDURE — 93306 TTE W/DOPPLER COMPLETE: CPT

## 2023-08-07 ENCOUNTER — LAB VISIT (OUTPATIENT)
Dept: LAB | Facility: HOSPITAL | Age: 73
End: 2023-08-07
Attending: STUDENT IN AN ORGANIZED HEALTH CARE EDUCATION/TRAINING PROGRAM
Payer: MEDICARE

## 2023-08-07 DIAGNOSIS — E78.00 HYPERCHOLESTEROLEMIA: Chronic | ICD-10-CM

## 2023-08-07 DIAGNOSIS — E11.22 STAGE 2 CHRONIC KIDNEY DISEASE DUE TO TYPE 2 DIABETES MELLITUS: ICD-10-CM

## 2023-08-07 DIAGNOSIS — Z79.4 TYPE 2 DIABETES MELLITUS WITH STAGE 2 CHRONIC KIDNEY DISEASE, WITH LONG-TERM CURRENT USE OF INSULIN: Chronic | ICD-10-CM

## 2023-08-07 DIAGNOSIS — E11.22 TYPE 2 DIABETES MELLITUS WITH STAGE 2 CHRONIC KIDNEY DISEASE, WITH LONG-TERM CURRENT USE OF INSULIN: Chronic | ICD-10-CM

## 2023-08-07 DIAGNOSIS — N18.2 STAGE 2 CHRONIC KIDNEY DISEASE DUE TO TYPE 2 DIABETES MELLITUS: ICD-10-CM

## 2023-08-07 DIAGNOSIS — E06.3 HASHIMOTO'S THYROIDITIS: Chronic | ICD-10-CM

## 2023-08-07 DIAGNOSIS — N18.31 CHRONIC KIDNEY DISEASE, STAGE 3A: ICD-10-CM

## 2023-08-07 DIAGNOSIS — N18.2 TYPE 2 DIABETES MELLITUS WITH STAGE 2 CHRONIC KIDNEY DISEASE, WITH LONG-TERM CURRENT USE OF INSULIN: Chronic | ICD-10-CM

## 2023-08-07 LAB
ALBUMIN SERPL-MCNC: 3.9 G/DL (ref 3.4–4.8)
ALBUMIN/GLOB SERPL: 1.2 RATIO (ref 1.1–2)
ALP SERPL-CCNC: 62 UNIT/L (ref 40–150)
ALT SERPL-CCNC: 16 UNIT/L (ref 0–55)
AST SERPL-CCNC: 17 UNIT/L (ref 5–34)
BASOPHILS # BLD AUTO: 0.02 X10(3)/MCL
BASOPHILS NFR BLD AUTO: 0.2 %
BILIRUBIN DIRECT+TOT PNL SERPL-MCNC: 0.3 MG/DL
BUN SERPL-MCNC: 19.5 MG/DL (ref 9.8–20.1)
CALCIUM SERPL-MCNC: 8.7 MG/DL (ref 8.4–10.2)
CHLORIDE SERPL-SCNC: 106 MMOL/L (ref 98–107)
CHOLEST SERPL-MCNC: 110 MG/DL
CHOLEST/HDLC SERPL: 3 {RATIO} (ref 0–5)
CO2 SERPL-SCNC: 28 MMOL/L (ref 23–31)
CREAT SERPL-MCNC: 0.99 MG/DL (ref 0.55–1.02)
CREAT UR-MCNC: 131.4 MG/DL (ref 45–106)
CREAT UR-MCNC: 132.8 MG/DL (ref 45–106)
DEPRECATED CALCIDIOL+CALCIFEROL SERPL-MC: 55.7 NG/ML (ref 30–80)
EOSINOPHIL # BLD AUTO: 0.22 X10(3)/MCL (ref 0–0.9)
EOSINOPHIL NFR BLD AUTO: 2.7 %
ERYTHROCYTE [DISTWIDTH] IN BLOOD BY AUTOMATED COUNT: 15 % (ref 11.5–17)
EST. AVERAGE GLUCOSE BLD GHB EST-MCNC: 154.2 MG/DL
GFR SERPLBLD CREATININE-BSD FMLA CKD-EPI: >60 MLS/MIN/1.73/M2
GLOBULIN SER-MCNC: 3.3 GM/DL (ref 2.4–3.5)
GLUCOSE SERPL-MCNC: 175 MG/DL (ref 82–115)
HBA1C MFR BLD: 7 %
HCT VFR BLD AUTO: 38.4 % (ref 37–47)
HDLC SERPL-MCNC: 40 MG/DL (ref 35–60)
HGB BLD-MCNC: 11.8 G/DL (ref 12–16)
IMM GRANULOCYTES # BLD AUTO: 0.02 X10(3)/MCL (ref 0–0.04)
IMM GRANULOCYTES NFR BLD AUTO: 0.2 %
LDLC SERPL CALC-MCNC: 45 MG/DL (ref 50–140)
LYMPHOCYTES # BLD AUTO: 2.36 X10(3)/MCL (ref 0.6–4.6)
LYMPHOCYTES NFR BLD AUTO: 28.5 %
MAGNESIUM SERPL-MCNC: 1.3 MG/DL (ref 1.6–2.6)
MCH RBC QN AUTO: 27.9 PG (ref 27–31)
MCHC RBC AUTO-ENTMCNC: 30.7 G/DL (ref 33–36)
MCV RBC AUTO: 90.8 FL (ref 80–94)
MICROALBUMIN UR-MCNC: 29.6 UG/ML
MICROALBUMIN/CREAT RATIO PNL UR: 22.5 MG/GM CR (ref 0–30)
MONOCYTES # BLD AUTO: 0.54 X10(3)/MCL (ref 0.1–1.3)
MONOCYTES NFR BLD AUTO: 6.5 %
NEUTROPHILS # BLD AUTO: 5.12 X10(3)/MCL (ref 2.1–9.2)
NEUTROPHILS NFR BLD AUTO: 61.9 %
NRBC BLD AUTO-RTO: 0 %
PHOSPHATE SERPL-MCNC: 2.7 MG/DL (ref 2.3–4.7)
PLATELET # BLD AUTO: 275 X10(3)/MCL (ref 130–400)
PMV BLD AUTO: 9.8 FL (ref 7.4–10.4)
POTASSIUM SERPL-SCNC: 4.3 MMOL/L (ref 3.5–5.1)
PROT SERPL-MCNC: 7.2 GM/DL (ref 5.8–7.6)
PROT UR STRIP-MCNC: 14.2 MG/DL
PTH-INTACT SERPL-MCNC: 64.2 PG/ML (ref 8.7–77)
RBC # BLD AUTO: 4.23 X10(6)/MCL (ref 4.2–5.4)
SODIUM SERPL-SCNC: 144 MMOL/L (ref 136–145)
TRIGL SERPL-MCNC: 127 MG/DL (ref 37–140)
TSH SERPL-ACNC: 2.25 UIU/ML (ref 0.35–4.94)
URINE PROTEIN/CREATININE RATIO (OHS): 0.1
VLDLC SERPL CALC-MCNC: 25 MG/DL
WBC # SPEC AUTO: 8.28 X10(3)/MCL (ref 4.5–11.5)

## 2023-08-07 PROCEDURE — 82043 UR ALBUMIN QUANTITATIVE: CPT

## 2023-08-07 PROCEDURE — 84100 ASSAY OF PHOSPHORUS: CPT

## 2023-08-07 PROCEDURE — 83036 HEMOGLOBIN GLYCOSYLATED A1C: CPT

## 2023-08-07 PROCEDURE — 80061 LIPID PANEL: CPT

## 2023-08-07 PROCEDURE — 83735 ASSAY OF MAGNESIUM: CPT

## 2023-08-07 PROCEDURE — 83970 ASSAY OF PARATHORMONE: CPT

## 2023-08-07 PROCEDURE — 36415 COLL VENOUS BLD VENIPUNCTURE: CPT

## 2023-08-07 PROCEDURE — 84443 ASSAY THYROID STIM HORMONE: CPT

## 2023-08-07 PROCEDURE — 85025 COMPLETE CBC W/AUTO DIFF WBC: CPT

## 2023-08-07 PROCEDURE — 82306 VITAMIN D 25 HYDROXY: CPT

## 2023-08-07 PROCEDURE — 82570 ASSAY OF URINE CREATININE: CPT

## 2023-08-07 PROCEDURE — 80053 COMPREHEN METABOLIC PANEL: CPT

## 2023-08-18 ENCOUNTER — OFFICE VISIT (OUTPATIENT)
Dept: INTERNAL MEDICINE | Facility: CLINIC | Age: 73
End: 2023-08-18
Payer: MEDICARE

## 2023-08-18 VITALS
RESPIRATION RATE: 18 BRPM | BODY MASS INDEX: 31.55 KG/M2 | HEART RATE: 80 BPM | DIASTOLIC BLOOD PRESSURE: 84 MMHG | HEIGHT: 64 IN | TEMPERATURE: 98 F | WEIGHT: 184.81 LBS | SYSTOLIC BLOOD PRESSURE: 132 MMHG

## 2023-08-18 DIAGNOSIS — N18.2 STAGE 2 CHRONIC KIDNEY DISEASE DUE TO TYPE 2 DIABETES MELLITUS: Chronic | ICD-10-CM

## 2023-08-18 DIAGNOSIS — N18.2 TYPE 2 DIABETES MELLITUS WITH STAGE 2 CHRONIC KIDNEY DISEASE, WITH LONG-TERM CURRENT USE OF INSULIN: Primary | ICD-10-CM

## 2023-08-18 DIAGNOSIS — K21.9 GASTROESOPHAGEAL REFLUX DISEASE, UNSPECIFIED WHETHER ESOPHAGITIS PRESENT: Chronic | ICD-10-CM

## 2023-08-18 DIAGNOSIS — E06.3 HASHIMOTO'S THYROIDITIS: Chronic | ICD-10-CM

## 2023-08-18 DIAGNOSIS — G89.29 CHRONIC RIGHT-SIDED LOW BACK PAIN WITH RIGHT-SIDED SCIATICA: Chronic | ICD-10-CM

## 2023-08-18 DIAGNOSIS — M54.41 CHRONIC RIGHT-SIDED LOW BACK PAIN WITH RIGHT-SIDED SCIATICA: Chronic | ICD-10-CM

## 2023-08-18 DIAGNOSIS — Z79.4 TYPE 2 DIABETES MELLITUS WITH STAGE 2 CHRONIC KIDNEY DISEASE, WITH LONG-TERM CURRENT USE OF INSULIN: Primary | ICD-10-CM

## 2023-08-18 DIAGNOSIS — E66.09 CLASS 1 OBESITY DUE TO EXCESS CALORIES WITH SERIOUS COMORBIDITY AND BODY MASS INDEX (BMI) OF 32.0 TO 32.9 IN ADULT: Chronic | ICD-10-CM

## 2023-08-18 DIAGNOSIS — I10 PRIMARY HYPERTENSION: Chronic | ICD-10-CM

## 2023-08-18 DIAGNOSIS — E78.00 HYPERCHOLESTEROLEMIA: Chronic | ICD-10-CM

## 2023-08-18 DIAGNOSIS — F32.0 CURRENT MILD EPISODE OF MAJOR DEPRESSIVE DISORDER, UNSPECIFIED WHETHER RECURRENT: Chronic | ICD-10-CM

## 2023-08-18 DIAGNOSIS — E11.22 TYPE 2 DIABETES MELLITUS WITH STAGE 2 CHRONIC KIDNEY DISEASE, WITH LONG-TERM CURRENT USE OF INSULIN: Primary | ICD-10-CM

## 2023-08-18 DIAGNOSIS — E11.22 STAGE 2 CHRONIC KIDNEY DISEASE DUE TO TYPE 2 DIABETES MELLITUS: Chronic | ICD-10-CM

## 2023-08-18 PROCEDURE — 99214 OFFICE O/P EST MOD 30 MIN: CPT | Mod: PBBFAC | Performed by: NURSE PRACTITIONER

## 2023-08-18 PROCEDURE — 99214 PR OFFICE/OUTPT VISIT, EST, LEVL IV, 30-39 MIN: ICD-10-PCS | Mod: S$PBB,,, | Performed by: NURSE PRACTITIONER

## 2023-08-18 PROCEDURE — 4010F PR ACE/ARB THEARPY RXD/TAKEN: ICD-10-PCS | Mod: CPTII,,, | Performed by: NURSE PRACTITIONER

## 2023-08-18 PROCEDURE — 3075F SYST BP GE 130 - 139MM HG: CPT | Mod: CPTII,,, | Performed by: NURSE PRACTITIONER

## 2023-08-18 PROCEDURE — 1159F PR MEDICATION LIST DOCUMENTED IN MEDICAL RECORD: ICD-10-PCS | Mod: CPTII,,, | Performed by: NURSE PRACTITIONER

## 2023-08-18 PROCEDURE — 1160F RVW MEDS BY RX/DR IN RCRD: CPT | Mod: CPTII,,, | Performed by: NURSE PRACTITIONER

## 2023-08-18 PROCEDURE — 3008F PR BODY MASS INDEX (BMI) DOCUMENTED: ICD-10-PCS | Mod: CPTII,,, | Performed by: NURSE PRACTITIONER

## 2023-08-18 PROCEDURE — 3079F PR MOST RECENT DIASTOLIC BLOOD PRESSURE 80-89 MM HG: ICD-10-PCS | Mod: CPTII,,, | Performed by: NURSE PRACTITIONER

## 2023-08-18 PROCEDURE — 3066F NEPHROPATHY DOC TX: CPT | Mod: CPTII,,, | Performed by: NURSE PRACTITIONER

## 2023-08-18 PROCEDURE — 1159F MED LIST DOCD IN RCRD: CPT | Mod: CPTII,,, | Performed by: NURSE PRACTITIONER

## 2023-08-18 PROCEDURE — 3075F PR MOST RECENT SYSTOLIC BLOOD PRESS GE 130-139MM HG: ICD-10-PCS | Mod: CPTII,,, | Performed by: NURSE PRACTITIONER

## 2023-08-18 PROCEDURE — 1160F PR REVIEW ALL MEDS BY PRESCRIBER/CLIN PHARMACIST DOCUMENTED: ICD-10-PCS | Mod: CPTII,,, | Performed by: NURSE PRACTITIONER

## 2023-08-18 PROCEDURE — 1125F PR PAIN SEVERITY QUANTIFIED, PAIN PRESENT: ICD-10-PCS | Mod: CPTII,,, | Performed by: NURSE PRACTITIONER

## 2023-08-18 PROCEDURE — 3051F HG A1C>EQUAL 7.0%<8.0%: CPT | Mod: CPTII,,, | Performed by: NURSE PRACTITIONER

## 2023-08-18 PROCEDURE — 4010F ACE/ARB THERAPY RXD/TAKEN: CPT | Mod: CPTII,,, | Performed by: NURSE PRACTITIONER

## 2023-08-18 PROCEDURE — 3288F PR FALLS RISK ASSESSMENT DOCUMENTED: ICD-10-PCS | Mod: CPTII,,, | Performed by: NURSE PRACTITIONER

## 2023-08-18 PROCEDURE — 3066F PR DOCUMENTATION OF TREATMENT FOR NEPHROPATHY: ICD-10-PCS | Mod: CPTII,,, | Performed by: NURSE PRACTITIONER

## 2023-08-18 PROCEDURE — 1125F AMNT PAIN NOTED PAIN PRSNT: CPT | Mod: CPTII,,, | Performed by: NURSE PRACTITIONER

## 2023-08-18 PROCEDURE — 3288F FALL RISK ASSESSMENT DOCD: CPT | Mod: CPTII,,, | Performed by: NURSE PRACTITIONER

## 2023-08-18 PROCEDURE — 3061F NEG MICROALBUMINURIA REV: CPT | Mod: CPTII,,, | Performed by: NURSE PRACTITIONER

## 2023-08-18 PROCEDURE — 3061F PR NEG MICROALBUMINURIA RESULT DOCUMENTED/REVIEW: ICD-10-PCS | Mod: CPTII,,, | Performed by: NURSE PRACTITIONER

## 2023-08-18 PROCEDURE — 3079F DIAST BP 80-89 MM HG: CPT | Mod: CPTII,,, | Performed by: NURSE PRACTITIONER

## 2023-08-18 PROCEDURE — 99214 OFFICE O/P EST MOD 30 MIN: CPT | Mod: S$PBB,,, | Performed by: NURSE PRACTITIONER

## 2023-08-18 PROCEDURE — 1101F PR PT FALLS ASSESS DOC 0-1 FALLS W/OUT INJ PAST YR: ICD-10-PCS | Mod: CPTII,,, | Performed by: NURSE PRACTITIONER

## 2023-08-18 PROCEDURE — 1101F PT FALLS ASSESS-DOCD LE1/YR: CPT | Mod: CPTII,,, | Performed by: NURSE PRACTITIONER

## 2023-08-18 PROCEDURE — 3008F BODY MASS INDEX DOCD: CPT | Mod: CPTII,,, | Performed by: NURSE PRACTITIONER

## 2023-08-18 PROCEDURE — 3051F PR MOST RECENT HEMOGLOBIN A1C LEVEL 7.0 - < 8.0%: ICD-10-PCS | Mod: CPTII,,, | Performed by: NURSE PRACTITIONER

## 2023-08-18 RX ORDER — OMEPRAZOLE 40 MG/1
40 CAPSULE, DELAYED RELEASE ORAL DAILY
Qty: 90 CAPSULE | Refills: 2 | Status: SHIPPED | OUTPATIENT
Start: 2023-08-18

## 2023-08-18 RX ORDER — CARVEDILOL 12.5 MG/1
12.5 TABLET ORAL 2 TIMES DAILY
Qty: 180 TABLET | Refills: 2 | Status: SHIPPED | OUTPATIENT
Start: 2023-08-18

## 2023-08-18 RX ORDER — METHOCARBAMOL 500 MG/1
500 TABLET, FILM COATED ORAL 2 TIMES DAILY
Qty: 60 TABLET | Refills: 2 | Status: SHIPPED | OUTPATIENT
Start: 2023-08-18 | End: 2023-11-14 | Stop reason: SDUPTHER

## 2023-08-18 RX ORDER — INSULIN GLARGINE 100 [IU]/ML
26 INJECTION, SOLUTION SUBCUTANEOUS DAILY
Qty: 23.4 ML | Refills: 2 | Status: SHIPPED | OUTPATIENT
Start: 2023-08-18

## 2023-08-18 RX ORDER — METFORMIN HYDROCHLORIDE 1000 MG/1
1000 TABLET ORAL 2 TIMES DAILY WITH MEALS
Qty: 180 TABLET | Refills: 2 | Status: SHIPPED | OUTPATIENT
Start: 2023-08-18

## 2023-08-18 RX ORDER — LISINOPRIL 10 MG/1
10 TABLET ORAL DAILY
Qty: 90 TABLET | Refills: 2 | Status: SHIPPED | OUTPATIENT
Start: 2023-08-18

## 2023-08-18 RX ORDER — ATORVASTATIN CALCIUM 80 MG/1
80 TABLET, FILM COATED ORAL DAILY
Qty: 90 TABLET | Refills: 2 | Status: SHIPPED | OUTPATIENT
Start: 2023-08-18

## 2023-08-18 NOTE — PROGRESS NOTES
Patient ID: [id number]     Chief Complaint: Leg Pain (Pain start from right buttock radiate down leg. 2. No recent falls)    HPI:     Marnie Briggs is a 72 y.o. female with diagnosis of HTN, HLD, DM2, CKD 3a, Hashimoto's, Obesity. Patient seen today for follow up. Patient last seen in clinic on 06/26/2023.   At previous appointment, Lantus decreased to 26 units due to hypoglycemia. Patient continued on Actos 45 mg daily and Metformin 1,000 mg bid for DM2. Patient seen by Geriatric clinic recently, Actos discontinued, Metformin and Lantus continued. Previous A1c 7.5; current A1c 7.0. Patient states taking medication as prescribed, tolerating well. Patient states she checks her CBGs at home, denies hypoglycemia, states he blood sugars have improved.   Patient referred to Geriatric clinic last appt due to falls, states she hasn't had a fall since. States doing well.   Patient completed physical therapy due to low back pain. Still has pain at times. Patient currently prescribed Gabapentin 600 mg Qhs and Flexeril 10 mg Qhs. Patient states Flexeril not helping. States she had seen pain management in the past with no improvement. Patient refuses Orthopedic/Neurosurgery referral. Patient denies bilateral sciatica, saddle anesthesia, bowel/bladder incontinence.  Patient continued on Lisinopril 10 mg daily and Coreg 12.5 mg bid for HTN. Patient states taking medication as prescribed, tolerating well. Patient states she has a B/P monitor at home but does not check her B/P at home.  Patient continued on Atorvastatin 80 mg daily for HLD. Patient states taking medication as prescribed, tolerating well.     Patient followed by Ophthalmology Clinic. Last appointment on 07/21/2023. Dx: T2DM without ophthalmologic manifestations, ERM with foveoschisis OS, Pseudophakia OU. Patient has follow up appointment scheduled for 11/27/2023.     Patient seen by Geriatric Clinic for falls. Last appointment on 07/12/2023. Fall, initial encounter:  Check ECHO. Will check on Cardiology referral, she would like to follow with our clinic here. Has significant polypharmacy with multiple medications that are sedating w/ increase fall risk or can cause hypoglycemia, see medication adjustments. Orthostatics + in clinic, stay well hydrated, see medications adjustments, monitor home BP. Essential hypertension: Discussed home BP log. Can consider decrease in Coreg in future. Type 2 diabetes mellitus with stage 2 chronic kidney disease, with long-term current use of insulin: Discontinue Actos. Continue Lantus 26U at bedtime and metformin. Discussed monitoring CBG closely at home following discontinuation of Actos. Can consider SGLT2/GLP1 or up titration of insulin if CBG uncontrolled. Chronic right-sided low back pain with right-sided sciatica: Decrease gabapentin to 600mg at bedtime. Current mild episode of major depressive disorder, unspecified whether recurrent: Continue Cymbalta for now, can consider once daily dosing in future. Decrease Remeron at bedtime to 7.5mg x2 weeks and if tolerating well can discontinue. Mood has been happy and stable. Polypharmacy: Discontinue Bentyl. Discontinue Actos. Decrease gabapentin as above. Decrease Remeron as above.  Patient has follow up appointment scheduled for 08/30/2023.     Patient followed by Mental Health Provider, Angie Mims NP. Last appointment on 02/16/2023. No medication changes needed. FU in 6 months. Patient had follow up appointment scheduled for 08/16/2023, patient canceled appointment.     Patient followed by Nephrology Clinic. Last appointment on 02/10/2023. CKD Stage II (GFR >60): BUN/Cr 19.7/0.98, eGFR improved to > 60 was 58 at last visit; Urine protein/creatinine ratio WNL; H&H 11.5/36.9, MCV 90.6 normocytic anemia; Vit D 27.2, intact PTH elevated 128, calcium 8.8; Dexa-scan form 11/18/22 WNL. Diabetes Mellitus type II: A1c 8.2, stable but above goal of 7; Continue on diabetic regimen per PCP;  Dducated on healthy diet and exercise. HTN: /88 mmHg at goal; Continue on Lisinopril and coreg; Educated on DASH diet and exercise. HLD: Continue to monitor, LDL at goal <70; Last LDL 55. Obesity: Encouraged healthy low fat diet and exercise; Continue to monitor. Return to clinic in 6 months with labs. Patient has follow up appointment scheduled for 08/11/2023.      Patient followed by GYN clinic. Last appointment on 09/27/2022. Patient has follow up appointment scheduled for 09/28/2023.      Patient followed by Endocrinology Clinic. Last appointment on 05/03/2021. Hashimoto's recently diagnosed TSH 5.7204 Free T4 0.86 On previous labs on 12/21/2020 TPO positive TPO quantitative 1130. Patient denies fatigue or weight gain. We will continue to monitor repeat on follow-up visit. Medications: continue Metformin 500 mg BID, Actos 30mg. Change Lantus to 25 units daily. Patient was to follow up in 6 months but missed appointment. Patient has follow up appointment scheduled for 04/29/2024.     Review of patient's allergies indicates:  No Known Allergies    Breast Cancer Screening: MMG negative on 11/23/2022  Cervical Cancer Screening: deferred due to age  Colorectal Cancer Screening: Cologuard negative on 12/09/2022  Diabetic Eye Exam: 05/12/2023  Diabetic Foot Exam: 11/18/2022  Lung Cancer Screening: N/A  Prostate Cancer Screening: N/A  AAA Screening: N/A  Osteoporosis Screening: normal on 12/01/2022  Medicare Wellness: ordered  Immunizations:   Immunization History   Administered Date(s) Administered    COVID-19 Vaccine 01/03/2022, 07/28/2022    COVID-19, MRNA, LN-S, PF (MODERNA FULL 0.5 ML DOSE) 03/19/2021, 04/16/2021    COVID-19, mRNA, LNP-S, bivalent booster, PF (Moderna Omicron) 04/19/2023    Influenza 10/08/2012, 10/11/2013, 11/17/2014    Influenza (FLUAD) - Quadrivalent - Adjuvanted - PF *Preferred* (65+) 11/18/2022    Influenza (FLUBLOK) - Quadrivalent - Recombinant - PF *Preferred* (egg allergy) 10/21/2019     Influenza - High Dose - PF (65 years and older) 10/24/2018    Influenza - Quadrivalent - High Dose - PF (65 years and older) 12/07/2020, 11/30/2021    Influenza - Trivalent - PF (ADULT) 12/15/2015, 10/07/2016, 10/03/2017, 11/14/2017    Influenza A (H1N1) 2009 Monovalent - IM 12/09/2009    Pneumococcal Polysaccharide - 23 Valent 12/10/2021    Zoster Recombinant 06/07/2021, 11/30/2021     Past Surgical History:   Procedure Laterality Date    ANKLE FUSION Right     CATARACT EXTRACTION Bilateral     CHOLECYSTECTOMY      COLONOSCOPY  11/30/2015    HYSTERECTOMY      NECK SURGERY      stint      Right kidney     family history includes Cancer in her brother; Diabetes in her brother, father, and mother; Heart disease in her father; Hypertension in her father; Stroke in her mother.    Social History     Socioeconomic History    Marital status:     Number of children: 4   Occupational History    Occupation: disabled   Tobacco Use    Smoking status: Never     Passive exposure: Never    Smokeless tobacco: Never   Substance and Sexual Activity    Alcohol use: Never    Drug use: Never    Sexual activity: Not Currently     Partners: Male     Social Determinants of Health     Financial Resource Strain: Low Risk  (7/12/2023)    Overall Financial Resource Strain (CARDIA)     Difficulty of Paying Living Expenses: Not hard at all   Food Insecurity: No Food Insecurity (7/12/2023)    Hunger Vital Sign     Worried About Running Out of Food in the Last Year: Never true     Ran Out of Food in the Last Year: Never true   Transportation Needs: No Transportation Needs (7/12/2023)    PRAPARE - Transportation     Lack of Transportation (Medical): No     Lack of Transportation (Non-Medical): No   Physical Activity: Inactive (7/12/2023)    Exercise Vital Sign     Days of Exercise per Week: 0 days     Minutes of Exercise per Session: 0 min   Stress: No Stress Concern Present (7/12/2023)    [employer] -  "Occupational Stress Questionnaire     Feeling of Stress : Not at all   Social Connections: Moderately Integrated (7/12/2023)    Social Connection and Isolation Panel [NHANES]     Frequency of Communication with Friends and Family: Three times a week     Frequency of Social Gatherings with Friends and Family: More than three times a week     Attends Gnosticist Services: More than 4 times per year     Active Member of Clubs or Organizations: No     Attends Club or Organization Meetings: More than 4 times per year     Marital Status:    Housing Stability: Low Risk  (7/12/2023)    Housing Stability Vital Sign     Unable to Pay for Housing in the Last Year: No     Number of Places Lived in the Last Year: 1     Unstable Housing in the Last Year: No     Current Outpatient Medications   Medication Instructions    aspirin 81 mg, Oral, Daily    atorvastatin (LIPITOR) 80 mg, Oral, Daily    carvediloL (COREG) 12.5 mg, Oral, 2 times daily    DULoxetine (CYMBALTA) 30 mg, Oral, 2 times daily    gabapentin (NEURONTIN) 600 mg, Oral, Nightly    LANTUS SOLOSTAR U-100 INSULIN 26 Units, Subcutaneous, Daily    lisinopriL 10 mg, Oral, Daily    loratadine (CLARITIN) 10 mg, Oral, Daily    metFORMIN (GLUCOPHAGE) 1,000 mg, Oral, 2 times daily with meals    methocarbamoL (ROBAXIN) 500 mg, Oral, 2 times daily    mirtazapine (REMERON) 7.5 mg, Oral, Nightly    multivitamin capsule 1 capsule, Oral, Daily    omeprazole (PRILOSEC) 40 mg, Oral, Daily    pen needle, diabetic 29 gauge x 1/2" Ndle   Insulin pen needles, See Instructions, [place]lin pen needles for once a day Lantus injection  E11.65, # 90 EA, 3 Refill(s), Pharmacy: [id number], 162, cm, Height/Length Dosing, 12/10/21 9:34:00 CST, 90.2, kg, Weight Dosing, 12/10/21 9:34:00 CST    vitamin D (VITAMIN D3) 1,000 Units, Oral, Daily       Subjective:     Review of Systems   Constitutional: Negative.    HENT: Negative.     Eyes: Negative.    Respiratory: Negative.   " "  Cardiovascular: Negative.    Gastrointestinal: Negative.    Endocrine: Negative.    Genitourinary: Negative.    Musculoskeletal:  Positive for back pain.   Skin: Negative.    Allergic/Immunologic: Negative.    Neurological: Negative.    Hematological: Negative.    Psychiatric/Behavioral: Negative.         Objective:     Visit Vitals  /84 (BP Location: Left arm, Patient Position: Sitting, BP Method: Medium (Manual))   Pulse 80   Temp 98.2 °F (36.8 °C) (Oral)   Resp 18   Ht 5' 4.02" (1.626 m)   Wt 83.8 kg (184 lb 12.8 oz)   BMI 31.70 kg/m²       Physical Exam  Vitals reviewed.   Constitutional:       Appearance: Normal appearance.   HENT:      Head: Normocephalic and atraumatic.      Mouth/Throat:      Mouth: Mucous membranes are moist.      Pharynx: Oropharynx is clear.   Eyes:      Extraocular Movements: Extraocular movements intact.      Conjunctiva/sclera: Conjunctivae normal.      Pupils: Pupils are equal, round, and reactive to light.   Cardiovascular:      Rate and Rhythm: Normal rate and regular rhythm.      Heart sounds: Normal heart sounds.   Pulmonary:      Effort: Pulmonary effort is normal.      Breath sounds: Normal breath sounds.   Abdominal:      General: Bowel sounds are normal.   Musculoskeletal:         General: Normal range of motion.      Cervical back: Normal range of motion.      Thoracic back: Tenderness present.      Lumbar back: Tenderness present.   Skin:     General: Skin is warm and dry.   Neurological:      Mental Status: She is alert and oriented to person, place, and time.   Psychiatric:         Mood and Affect: Mood normal.         Behavior: Behavior normal.       Labs Reviewed:     Hematology:  Lab Results   Component Value Date    WBC 8.28 08/07/2023    HGB 11.8 (L) 08/07/2023    HCT 38.4 08/07/2023     08/07/2023     Chemistry:  Lab Results   Component Value Date     08/07/2023    K 4.3 08/07/2023    CHLORIDE 106 08/07/2023    BUN 19.5 08/07/2023    CREATININE " 0.99 08/07/2023    EGFRNORACEVR >60 08/07/2023    GLUCOSE 175 (H) 08/07/2023    CALCIUM 8.7 08/07/2023    ALKPHOS 62 08/07/2023    LABPROT 7.2 08/07/2023    ALBUMIN 3.9 08/07/2023    BILIDIR 0.3 03/22/2022    IBILI 0.30 03/22/2022    AST 17 08/07/2023    ALT 16 08/07/2023    MG 1.30 (L) 08/07/2023    PHOS 2.7 08/07/2023    [id number] 55.7 08/07/2023      Lab Results   Component Value Date    HGBA1C 7.0 08/07/2023      Lipid Panel:  Lab Results   Component Value Date    CHOL 110 08/07/2023    HDL 40 08/07/2023    LDL 45.00 (L) 08/07/2023    TRIG 127 08/07/2023    TOTALCHOLEST 3 08/07/2023      Thyroid:  Lab Results   Component Value Date    TSH 2.251 08/07/2023      Urine:  Lab Results   Component Value Date    COLORUA Light-Yellow (A) 05/19/2022    APPEARANCEUA Clear 05/19/2022    SGUA 1.013 05/19/2022    PHUA 6.5 05/19/2022    PROTEINUA Negative 05/19/2022    GLUCOSEUA Normal 05/19/2022    KETONESUA Negative 05/19/2022    BLOODUA Negative 05/19/2022    NITRITESUA Negative 05/19/2022    LEUKOCYTESUR 75 05/19/2022    RBCUA 0-5 05/19/2022    WBCUA 0-5 05/19/2022    BACTERIA None Seen 05/19/2022    SQEPUA Occ (A) 05/19/2022    HYALINECASTS None Seen 05/19/2022    CREATRANDUR 132.8 (H) 08/07/2023    CREATRANDUR 131.4 (H) 08/07/2023    PROTEINURINE 14.2 08/07/2023    UPROTCREA 0.1 08/07/2023        Assessment:       ICD-10-CM ICD-9-CM   1. Type 2 diabetes mellitus with stage 2 chronic kidney disease, with long-term current use of insulin  E11.22 250.40    N18.2 585.2    Z79.4 V58.67   2. Primary hypertension  I10 401.9   3. Hypercholesterolemia  E78.00 272.0   4. Hashimoto's thyroiditis  E06.3 245.2   5. Stage 2 chronic kidney disease due to type 2 diabetes mellitus  E11.22 250.40    N18.2 585.2   6. Chronic right-sided low back pain with right-sided sciatica  M54.41 724.2    G89.29 724.3     338.29   7. Gastroesophageal reflux disease, unspecified whether esophagitis present  K21.9 530.81   8. Current mild episode of  major depressive disorder, unspecified whether recurrent  F32.0 296.21   9. Class 1 obesity due to excess calories with serious comorbidity and body mass index (BMI) of 32.0 to 32.9 in adult  E66.09 278.00    Z68.32 V85.32          Plan:     1. Type 2 diabetes mellitus with stage 2 chronic kidney disease, with long-term current use of insulin  Continue Metformin 1,000 mg bid, Lantus 26 units  Encouraged home CBG monitoring.  Hypoglycemic episodes: denies  Body mass index is 31.7 kg/m².  Hemoglobin A1c   Date Value Ref Range Status   08/07/2023 7.0 <=7.0 % Final     Results for orders placed or performed in visit on 08/07/23   Microalbumin/Creatinine Ratio, Urine   Result Value Ref Range    Urine Microalbumin 29.6 <=30.0 ug/ml    Urine Creatinine 131.4 (H) 45.0 - 106.0 mg/dL    Microalbumin Creatinine Ratio 22.5 0.0 - 30.0 mg/gm Cr   On Atorvastatin  On Lisinopril  Weight Loss Encouraged  ADA Diet  - metFORMIN (GLUCOPHAGE) 1000 MG tablet; Take 1 tablet (1,000 mg total) by mouth 2 (two) times daily with meals.  Dispense: 180 tablet; Refill: 2  - CBC Auto Differential; Future  - Comprehensive Metabolic Panel; Future  - Hemoglobin A1C; Future  - Urinalysis; Future  - Microalbumin/Creatinine Ratio, Urine; Future  - Urinalysis    2. Primary hypertension  Vitals:    08/18/23 0733   BP: 132/84   Pulse: 80   Resp: 18   Temp: 98.2 °F (36.8 °C)      Results for orders placed or performed in visit on 08/07/23   Microalbumin/Creatinine Ratio, Urine   Result Value Ref Range    Urine Microalbumin 29.6 <=30.0 ug/ml    Urine Creatinine 131.4 (H) 45.0 - 106.0 mg/dL    Microalbumin Creatinine Ratio 22.5 0.0 - 30.0 mg/gm Cr     Results for orders placed or performed during the hospital encounter of 06/26/23   SCHEDULED EKG 12-LEAD (to Muse)    Collection Time: 06/26/23  9:06 AM    Narrative    Test Reason : W19.XXXA,    Vent. Rate : 083 BPM     Atrial Rate : 083 BPM     P-R Int : 164 ms          QRS Dur : 108 ms      QT Int : 404 ms        P-R-T Axes : 073 -25 068 degrees     QTc Int : 474 ms    Sinus rhythm with occasional Premature ventricular complexes  Low voltage QRS  Incomplete right bundle branch block  Borderline Abnormal ECG  No previous ECGs available  Confirmed by Raheem Don MD ([id number]) on 6/27/2023 1:47:52 PM    Referred By: LARA DE LEÓN           Confirmed By:Raheem Don MD   Continue Lisinopril 10 mg daily, Carvedilol 12.5 mg bid  DASH diet  Encouraged home blood pressure monitoring  - lisinopriL 10 MG tablet; Take 1 tablet (10 mg total) by mouth once daily.  Dispense: 90 tablet; Refill: 2    3. Hypercholesterolemia  Continue Atorvastatin 80 mg daily   Weight loss encouraged  Low fat/high fiber diet  Increase physical activity  Tobacco cessation encouraged  Cholesterol Total   Date Value Ref Range Status   08/07/2023 110 <=200 mg/dL Final     HDL Cholesterol   Date Value Ref Range Status   08/07/2023 40 35 - 60 mg/dL Final     Triglyceride   Date Value Ref Range Status   08/07/2023 127 37 - 140 mg/dL Final     LDL Cholesterol   Date Value Ref Range Status   08/07/2023 45.00 (L) 50.00 - 140.00 mg/dL Final   - Lipid Panel; Future    4. Hashimoto's thyroiditis  TSH: 2.251  - TSH; Future    5. Stage 2 chronic kidney disease due to type 2 diabetes mellitus  Followed by Nephrology Clinic.   Latest Reference Range & Units 08/07/23 08:11   BUN 9.8 - 20.1 mg/dL 19.5   Creatinine 0.55 - 1.02 mg/dL 0.99   eGFR mls/min/1.73/m2 >60     6. Chronic right-sided low back pain with right-sided sciatica  Continue Gabapentin 600 mg Qhs  Stop Flexeril due to patient states not helping  Start Robaxin 500 mg bid    7. Gastroesophageal reflux disease, unspecified whether esophagitis present  Continue Omeprazole 40 mg daily  Avoid spicy foods  Remain in an upright position for at least 30 minutes after consuming meals    8. Current mild episode of major depressive disorder, unspecified whether recurrent  Continue Cymbalta, Remeron    9. Class 1 obesity due to  excess calories with serious comorbidity and body mass index (BMI) of 32.0 to 32.9 in adult  Body mass index is 31.7 kg/m².  Thyroid Stimulating Hormone   Date Value Ref Range Status   08/07/2023 2.251 0.350 - 4.940 uIU/mL Final     Vit D 25 OH   Date Value Ref Range Status   08/07/2023 55.7 30.0 - 80.0 ng/mL Final     Hemoglobin A1c   Date Value Ref Range Status   08/07/2023 7.0 <=7.0 % Final   Sleep Study: none noted  Weight Loss Encouraged  Increase Physical Activity  - Vitamin D; Future      Follow up in about 6 months (around 2/18/2024) for Labs. In addition to their scheduled follow up, the patient has also been instructed to follow up on as needed basis.     LUCIEN CariasP

## 2023-08-24 ENCOUNTER — TELEPHONE (OUTPATIENT)
Dept: INTERNAL MEDICINE | Facility: CLINIC | Age: 73
End: 2023-08-24
Payer: MEDICARE

## 2023-09-07 ENCOUNTER — OFFICE VISIT (OUTPATIENT)
Dept: CARDIOLOGY | Facility: CLINIC | Age: 73
End: 2023-09-07
Payer: MEDICARE

## 2023-09-07 VITALS
WEIGHT: 176.56 LBS | RESPIRATION RATE: 20 BRPM | HEART RATE: 78 BPM | DIASTOLIC BLOOD PRESSURE: 73 MMHG | OXYGEN SATURATION: 99 % | HEIGHT: 64 IN | TEMPERATURE: 98 F | BODY MASS INDEX: 30.14 KG/M2 | SYSTOLIC BLOOD PRESSURE: 109 MMHG

## 2023-09-07 DIAGNOSIS — R94.31 ABNORMAL EKG: ICD-10-CM

## 2023-09-07 DIAGNOSIS — I65.23 BILATERAL CAROTID ARTERY STENOSIS: ICD-10-CM

## 2023-09-07 DIAGNOSIS — E78.00 HYPERCHOLESTEROLEMIA: Chronic | ICD-10-CM

## 2023-09-07 DIAGNOSIS — R55 SYNCOPE AND COLLAPSE: ICD-10-CM

## 2023-09-07 DIAGNOSIS — I10 ESSENTIAL HYPERTENSION: Primary | Chronic | ICD-10-CM

## 2023-09-07 PROCEDURE — 99214 OFFICE O/P EST MOD 30 MIN: CPT | Mod: PBBFAC | Performed by: INTERNAL MEDICINE

## 2023-09-07 RX ORDER — EZETIMIBE 10 MG/1
10 TABLET ORAL DAILY
Qty: 90 TABLET | Refills: 3 | Status: SHIPPED | OUTPATIENT
Start: 2023-09-07 | End: 2024-01-30

## 2023-09-07 NOTE — PROGRESS NOTES
Cardiology Attending    I have discussed Marnie Briggs including the patient's symptoms, findings, and management plan with the cardiology fellow.  Please see the Cardiology Note for details.

## 2023-09-07 NOTE — PROGRESS NOTES
[facility]   Cardiology Clinic      Date of Visit: 9/7/2023  Reason for Visit/Chief Complaint:   Chief Complaint    initial visit denies chest pain or sob no questions           History of Present Illness:      Marnie Briggs is a 72 y.o. female with a PMH significant for HTN, HLD, DM2, CKD 3a, Hashimoto's, Obesity who was referred to cardiology clinic for abnormal EKG. Patient reports having a syncopal episode several months ago. States she went to the post office on a Sunday and no one was there and all she remembers is waking up on the ground. No associated dizziness and no other episodes of syncope or  presyncope. She was previous followed by [facility] and on review of their records, there is a history of non-obstructive carotid artery disease.     CP:  The patient has no chest discomfort.      SOB:  The patient denies shortness of breath. No CALLE    EDEMA:  The patient denies edema.      ORTHOPNEA:  The patient denies orthopnea.  No PND.      SYNCOPE:  The patient denies near syncope.  Reports one episode syncope.   No dizziness.    PALPITATIONS:  The patient has no palpitations.    LEVEL OF EXERTION:  The patient does house work and does not have symptoms with this level of exertion.  The patient's level of exertion is good.    Past Medical History:        Past Medical History:   Diagnosis Date    CKD (chronic kidney disease) stage 2, GFR 60-89 ml/min     DM (diabetes mellitus)     HLD (hyperlipidemia)     HTN (hypertension)        Surgical History:        Past Surgical History:   Procedure Laterality Date    ANKLE FUSION Right     CATARACT EXTRACTION Bilateral     CHOLECYSTECTOMY      COLONOSCOPY  11/30/2015    HYSTERECTOMY      NECK SURGERY      stint      Right kidney       Family History:        Family History   Problem Relation Age of Onset    Diabetes Mother     Stroke Mother     Hypertension Father     Diabetes Father     Heart disease Father     Cancer Brother     Diabetes Brother   "      Social History:        Social History     Tobacco Use    Smoking status: Never     Passive exposure: Never    Smokeless tobacco: Never   Substance Use Topics    Alcohol use: Never    Drug use: Never       Allergies:       Review of patient's allergies indicates:  No Known Allergies    Medications:        Current Outpatient Medications   Medication Sig Dispense Refill    aspirin 81 MG Chew Take 81 mg by mouth once daily at 6am.      atorvastatin (LIPITOR) 80 MG tablet Take 1 tablet (80 mg total) by mouth once daily. 90 tablet 2    carvediloL (COREG) 12.5 MG tablet Take 1 tablet (12.5 mg total) by mouth 2 (two) times daily. 180 tablet 2    DULoxetine (CYMBALTA) 30 MG capsule Take 1 capsule (30 mg total) by mouth 2 (two) times daily. 180 capsule 1    gabapentin (NEURONTIN) 600 MG tablet Take 1 tablet (600 mg total) by mouth every evening. 180 tablet 1    LANTUS SOLOSTAR U-100 INSULIN glargine 100 units/mL SubQ pen Inject 26 Units into the skin once daily. 23.4 mL 2    lisinopriL 10 MG tablet Take 1 tablet (10 mg total) by mouth once daily. 90 tablet 2    loratadine (CLARITIN) 10 mg tablet Take 10 mg by mouth once daily.      metFORMIN (GLUCOPHAGE) 1000 MG tablet Take 1 tablet (1,000 mg total) by mouth 2 (two) times daily with meals. 180 tablet 2    methocarbamoL (ROBAXIN) 500 MG Tab Take 1 tablet (500 mg total) by mouth 2 (two) times a day. 60 tablet 2    mirtazapine (REMERON) 15 MG tablet Take 0.5 tablets (7.5 mg total) by mouth every evening. 90 tablet 1    multivitamin capsule Take 1 capsule by mouth once daily.      omeprazole (PRILOSEC) 40 MG capsule Take 1 capsule (40 mg total) by mouth once daily. 90 capsule 2    vitamin D (VITAMIN D3) 1000 units Tab Take 1,000 Units by mouth once daily.      pen needle, diabetic 29 gauge x 1/2" Ndle   Insulin pen needles, See Instructions, Inuslin pen needles for once a day Lantus injection  E11.65, # 90 EA, 3 Refill(s), Pharmacy: [id number], 162, cm, " "Height/Length Dosing, 12/10/21 9:34:00 CST, 90.2, kg, Weight Dosing, 12/10/21 9:34:00 CST       No current facility-administered medications for this visit.       I have reviewed and updated the patient's medications, allergies, past medical history, surgical history, social history and family history as needed.    Review of Systems:      Review of Systems   Constitutional:  Negative for chills, diaphoresis and fever.   HENT:  Negative for hearing loss and nosebleeds.    Eyes:  Negative for blurred vision.   Respiratory:  Negative for shortness of breath.    Cardiovascular:  Negative for chest pain, palpitations, orthopnea, claudication and PND.   Gastrointestinal:  Negative for nausea and vomiting.   Genitourinary:  Negative for dysuria.   Musculoskeletal:  Negative for myalgias.   Skin:  Negative for rash.   Neurological:  Negative for dizziness and headaches.       Objective:        Vitals:    09/07/23 1013   BP: 109/73   Pulse: 78   Resp: 20   Temp: 98.1 °F (36.7 °C)     Wt Readings from Last 3 Encounters:   09/07/23 80.1 kg (176 lb 9.4 oz)   08/18/23 83.8 kg (184 lb 12.8 oz)   07/21/23 85.3 kg (188 lb)     Temp Readings from Last 3 Encounters:   09/07/23 98.1 °F (36.7 °C) (Oral)   08/18/23 98.2 °F (36.8 °C) (Oral)   07/12/23 98.4 °F (36.9 °C) (Oral)     BP Readings from Last 3 Encounters:   09/07/23 109/73   08/18/23 132/84   07/12/23 102/69     Pulse Readings from Last 3 Encounters:   09/07/23 78   08/18/23 80   07/12/23 76       Vitals:    09/07/23 1013   BP: 109/73   BP Location: Left arm   Patient Position: Sitting   BP Method: Medium (Automatic)   Pulse: 78   Resp: 20   Temp: 98.1 °F (36.7 °C)   TempSrc: Oral   SpO2: 99%   Weight: 80.1 kg (176 lb 9.4 oz)   Height: 5' 4" (1.626 m)     Body mass index is 30.31 kg/m².    Physical Exam  Constitutional:       Appearance: She is well-developed.   HENT:      Head: Normocephalic and atraumatic.   Eyes:      Conjunctiva/sclera: Conjunctivae normal.   Neck:      " Vascular: No carotid bruit or JVD.   Cardiovascular:      Rate and Rhythm: Normal rate and regular rhythm.      Chest Wall: PMI is not displaced.      Pulses: Normal pulses and intact distal pulses.      Heart sounds: No midsystolic click. No murmur heard.     No friction rub. No gallop.   Pulmonary:      Effort: Pulmonary effort is normal.      Breath sounds: Normal breath sounds.   Abdominal:      General: Abdomen is flat. Bowel sounds are normal.   Musculoskeletal:      Right lower leg: No edema.      Left lower leg: No edema.   Skin:     General: Skin is warm and dry.   Neurological:      Mental Status: She is alert. Mental status is at baseline.   Psychiatric:         Mood and Affect: Mood normal.         Behavior: Behavior normal. Behavior is cooperative.         Judgment: Judgment normal.          Labs:      I have reviewed the following labs below:      CBC:  Lab Results   Component Value Date    WBC 8.28 08/07/2023    HGB 11.8 (L) 08/07/2023    HCT 38.4 08/07/2023     08/07/2023    MCV 90.8 08/07/2023    RDW 15.0 08/07/2023     BMP:  Lab Results   Component Value Date     08/07/2023    K 4.3 08/07/2023    CO2 28 08/07/2023    BUN 19.5 08/07/2023    CALCIUM 8.7 08/07/2023    MG 1.30 (L) 08/07/2023    PHOS 2.7 08/07/2023     LFTs:  Lab Results   Component Value Date    ALBUMIN 3.9 08/07/2023    BILITOT 0.3 08/07/2023    AST 17 08/07/2023    ALKPHOS 62 08/07/2023    ALT 16 08/07/2023     FLP:  Cholesterol Total   Date Value Ref Range Status   08/07/2023 110 <=200 mg/dL Final     HDL Cholesterol   Date Value Ref Range Status   08/07/2023 40 35 - 60 mg/dL Final     LDL Cholesterol   Date Value Ref Range Status   08/07/2023 45.00 (L) 50.00 - 140.00 mg/dL Final     Triglyceride   Date Value Ref Range Status   08/07/2023 127 37 - 140 mg/dL Final     DM:  Lab Results   Component Value Date    HGBA1C 7.0 08/07/2023    HGBA1C 7.5 (H) 06/23/2023    HGBA1C 8.2 (H) 02/06/2023    CREATININE 0.99 08/07/2023      Thyroid:  Lab Results   Component Value Date    TSH 2.251 08/07/2023     Anemia:  Lab Results   Component Value Date    FERRITIN 273.95 (H) 07/09/2020     Cardiac:  Lab Results   Component Value Date    TROPONINI 0.011 03/22/2022    TROPONINI 0.014 06/27/2020       Cardiac Studies/Imaging:        I have reviewed the following studies below:      Echocardiogram  Results for orders placed during the hospital encounter of 07/24/23  Echo  Interpretation Summary  · Concentric hypertrophy and normal systolic function.  · The estimated ejection fraction is 59%.  · Normal left ventricular diastolic function.  · Normal central venous pressure (3 mmHg).  · With normal right ventricular systolic function.          Assessment & Plan:      72 y.o. female with the following medical problems:    Carotid Artery Stenosis   - given recent episode of syncope, will order repeat US Carotid    Syncope   - will obtain repeat US Carotid    - will obtain 30 day monitor     Abnormal ECG/ Incomplete RBBB   - noted on EKG - no indication for further evaluation at this time     HTN   - /73 today - At goal    - Continue Coreg and Lisinopril    - Continue Low Na Diet and Exercise as tolerated     HTN   -  - Not at goal    - Continue atorvastatin 80mg   - will add Zetia    - repeat FLP prior to next appoitment    - Continue Diet and Exercise as tolerated       Return to clinic in 4 months.      Future Appointments   Date Time Provider Department Center   9/13/2023  9:00 AM Kenzie Blackman MD [facility] FM RES [place]   9/28/2023  7:50 AM Margaret Briones, ANP [facility] GYN [place]   10/27/2023 10:30 AM Susan Varma MD [facility] NEPHR [place]   11/27/2023 11:00 AM [facility] [place]   2/19/2024  7:30 AM Melissa Woodall, LUCIENP [facility] INTMED [place]   4/29/2024  9:30 AM Pavithra Hernandez, NP [facility] ENDOCR [place]         Destiny Medrano DO  U Cardiology Fellow, PGY-6  09/07/2023 10:32  AM

## 2023-09-13 ENCOUNTER — OFFICE VISIT (OUTPATIENT)
Dept: FAMILY MEDICINE | Facility: CLINIC | Age: 73
End: 2023-09-13
Payer: MEDICARE

## 2023-09-13 ENCOUNTER — HOSPITAL ENCOUNTER (OUTPATIENT)
Dept: CARDIOLOGY | Facility: HOSPITAL | Age: 73
Discharge: HOME | End: 2023-09-13
Attending: STUDENT IN AN ORGANIZED HEALTH CARE EDUCATION/TRAINING PROGRAM
Payer: MEDICARE

## 2023-09-13 VITALS
SYSTOLIC BLOOD PRESSURE: 134 MMHG | RESPIRATION RATE: 20 BRPM | OXYGEN SATURATION: 98 % | BODY MASS INDEX: 30.9 KG/M2 | HEART RATE: 90 BPM | WEIGHT: 181 LBS | TEMPERATURE: 98 F | HEIGHT: 64 IN | DIASTOLIC BLOOD PRESSURE: 83 MMHG

## 2023-09-13 DIAGNOSIS — R55 SYNCOPE AND COLLAPSE: ICD-10-CM

## 2023-09-13 DIAGNOSIS — E11.22 TYPE 2 DIABETES MELLITUS WITH STAGE 2 CHRONIC KIDNEY DISEASE, WITH LONG-TERM CURRENT USE OF INSULIN: Chronic | ICD-10-CM

## 2023-09-13 DIAGNOSIS — Z79.4 TYPE 2 DIABETES MELLITUS WITH STAGE 2 CHRONIC KIDNEY DISEASE, WITH LONG-TERM CURRENT USE OF INSULIN: Chronic | ICD-10-CM

## 2023-09-13 DIAGNOSIS — G89.29 CHRONIC RIGHT-SIDED LOW BACK PAIN WITH RIGHT-SIDED SCIATICA: ICD-10-CM
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
## 2023-09-13 DIAGNOSIS — M54.41 CHRONIC RIGHT-SIDED LOW BACK PAIN WITH RIGHT-SIDED SCIATICA: ICD-10-CM

## 2023-09-13 DIAGNOSIS — Z79.899 POLYPHARMACY: ICD-10-CM

## 2023-09-13 DIAGNOSIS — I10 ESSENTIAL HYPERTENSION: ICD-10-CM

## 2023-09-13 DIAGNOSIS — Z23 NEED FOR VACCINATION: ICD-10-CM

## 2023-09-13 DIAGNOSIS — N18.2 TYPE 2 DIABETES MELLITUS WITH STAGE 2 CHRONIC KIDNEY DISEASE, WITH LONG-TERM CURRENT USE OF INSULIN: Chronic | ICD-10-CM

## 2023-09-13 PROCEDURE — G0009 ADMIN PNEUMOCOCCAL VACCINE: HCPCS | Mod: PBBFAC

## 2023-09-13 PROCEDURE — 99215 OFFICE O/P EST HI 40 MIN: CPT | Mod: PBBFAC,25 | Performed by: FAMILY MEDICINE

## 2023-09-13 PROCEDURE — 90677 PCV20 VACCINE IM: CPT | Mod: PBBFAC

## 2023-09-13 PROCEDURE — 93229 REMOTE 30 DAY ECG TECH SUPP: CPT

## 2023-09-13 RX ORDER — MELOXICAM 7.5 MG/1
7.5 TABLET ORAL DAILY
Qty: 30 TABLET | Refills: 0 | Status: SHIPPED | OUTPATIENT
Start: 2023-09-13 | End: 2023-10-13

## 2023-09-13 RX ORDER — GABAPENTIN 300 MG/1
300 CAPSULE ORAL DAILY
Qty: 90 CAPSULE | Refills: 0 | Status: CANCELLED | OUTPATIENT
Start: 2023-09-13 | End: 2023-12-12

## 2023-09-13 RX ADMIN — PNEUMOCOCCAL 20-VALENT CONJUGATE VACCINE 0.5 ML
2.2; 2.2; 2.2; 2.2; 2.2; 2.2; 2.2; 2.2; 2.2; 2.2; 2.2; 2.2; 2.2; 2.2; 2.2; 2.2; 4.4; 2.2; 2.2; 2.2 INJECTION, SUSPENSION INTRAMUSCULAR at 10:09

## 2023-09-13 NOTE — PROGRESS NOTES
"[facility]  [facility] Geriatric Clinic Note    DOS: 9/13/2023      Subjective:  Chief Complaint:    Chief Complaint   Patient presents with    Follow-up     C/O right leg pain that starts in buttock and radiates down to foot.       History of Present Illness:    72 y.o. female  PMH bulging lumbar disk, depression, anxiety, HTN, HLD, BL carotid stenosis,  DMII, CKDII 2/2 DMII, GERD, vit D def, Hashimoto thyroiditis, obesity. Last seen 7/2023. PCP LATOYA Mo     C/C RT leg pain     RT leg pain   Reports RT leg pain 8/10, starts at RT buttocks just lateral to spine and travels down the back of thigh and calf to foot  Insidous with intermittent flares  Unsure of triggers; nothing makes it better other than sleep   Exercises with minimal relief   Received 4mg of Decadron per pt [urgent care clinic in [place]], with no relief; also using heating pad, Tylenol, Robaxin 500 BID  Takes duloxetine 30mg BID, gabapentin 600mg pm  GFR > 60 8/2023, denies GI bleed/blood in stool but on Prilosec   Amenable to Toradol injection'/meloxicam, has had good results in past; amenable to     HTN   140/80-90s at home per pt, acceptable in clinic   Adh to lisinopril 10, Coreg 12.5  Adh to DASH     Originally referred for recurrent falls, denies any recent with last 3m ago   Walks well independently without assistance  S/p 6 weeks PT for lumbar back pain   BG highest 165 after steroid injection  Lives alone and is independent of all ADL and iADL   Has family nearby that she sees frequently       ROS:   RT leg pain   Denies HA, dizziness, vision changes, edema, chest pain, SOB, palpitations, GI/ Sx, rashes      Objective:   Vitals:    09/13/23 0832   BP: 134/83   BP Location: Left arm   Patient Position: Sitting   BP Method: Large (Automatic)   Pulse: 90   Resp: 20   Temp: 98.3 °F (36.8 °C)   TempSrc: Oral   SpO2: 98%   Weight: 82.1 kg (181 lb)   Height: 5' 4.02" (1.626 m)        General: no acute " distress   CVS: RRR no murmur appreciated, radial and dorsalis pedis pulses 2+ BL with no edema   Resp: CTA  GI: BS normactive in all 4 quad, nonTTP   MSK: point tenderness over RT illiac crest with no spasms noted, well healed surgical scar s/p ureteral stent  Antalgic gait  Neg straight leg BL  Matt/Fadir unable to adeq perform 2/2 pain  Good sensation BL legs    Cognitive Assessment:  SLUMS performed date: 7/12/23 and scanned to patient's chart in media, Score 29/30    Depression Assessment:       9/13/2023     8:32 AM 9/7/2023    10:11 AM 8/18/2023     7:30 AM 7/21/2023    11:21 AM 7/12/2023     7:57 AM 6/26/2023     7:36 AM 5/12/2023    11:42 AM   Depression Patient Health Questionnaire   Over the last two weeks how often have you been bothered by little interest or pleasure in doing things Not at all Several days Not at all Several days Not at all Not at all Not at all   Over the last two weeks how often have you been bothered by feeling down, depressed or hopeless Not at all Several days Several days Several days Several days Several days Not at all   PHQ-2 Total Score 0 2 1 2 1 1 0       Mobility Assessment:   - Timed Up and Go (10 ft < 12 secs): Able  - Sit to  chair x 3 (without using arms): Able  - Tandem stance and gait: Able  - semi Tandem stance and gait: Able  - Side by Side stance (> 10 secs): Able      Assistive Devices:   none  Glasses: No  Hearing Aids: No  Dentures: No    Social support/Living Situation: Lives at home alone, has family near by with frequent visits     Polypharmacy Identified? (>9 meds) Yes    Advanced Care Planning:  - [place] POST: not done yet, counseled patient and information provided  - Medical POA: not done yet, counseled patient and information provided  Recent Imaging:  Echo  · Concentric hypertrophy and normal systolic function.  · The estimated ejection fraction is 59%.  · Normal left ventricular diastolic function.  · Normal central venous pressure (3 mmHg).  ·  With normal right ventricular systolic function.          Assessment & Plan:      Fall, subsequent encounter  -   ECHO WNL as above   - 10/5 BL carotid US pending   -  Cardiology has ordered Holter monitor, pt has not received; following with [facility]      Chronic right-sided low back pain with right-sided sciatica  - continue gabapentin to 600mg at bedtime  - continue acetaminophen 1000 TID, continue duloxetine   - meloxicam 7.5 qD for 30d, advised not to take any other NSAIDS given CKDII  - repeat BMP at FU    Essential hypertension  - continue home BP measurements, medications with out adjustment  - consider decrease in Coreg in future, review after cardiology appt     DMII in the setting of CKDII with long-term current use of insulin  - A1C 7.0 8/2023  - continue Lantus 26U at bedtime and metformin 1000 BID  - consider SGLT2/GLP1 or up titration of insulin if CBG uncontrolled     Polypharmacy  - medications reviewed     Need for vaccination  Given Prevnar today  Need tetanus    CBC WNL other than , mag 1.3 8/2023  Lipid WNL 8/2023  TSH WNL 8/2023  Vit D WNL 8/2023  A1C 7.0 8/2023  PTH WNL 8/2023  Prot/Cr WNL 8/2023    MMG BIRADS 1 BL 11/2022  DEXA WNL 12/2022  Hx of Colonoscopy, per pt return in 10y but unsure when performed        Health Maintenance Reviewed:  Immunization History   Administered Date(s) Administered    COVID-19 Vaccine 01/03/2022, 07/28/2022    COVID-19, MRNA, LN-S, PF (MODERNA FULL 0.5 ML DOSE) 03/19/2021, 04/16/2021    COVID-19, mRNA, LNP-S, bivalent booster, PF (Moderna Omicron) 04/19/2023    Influenza 10/08/2012, 10/11/2013, 11/17/2014    Influenza (FLUAD) - Quadrivalent - Adjuvanted - PF *Preferred* (65+) 11/18/2022    Influenza (FLUBLOK) - Quadrivalent - Recombinant - PF *Preferred* (egg allergy) 10/21/2019    Influenza - High Dose - PF (65 years and older) 10/24/2018    Influenza - Quadrivalent - High Dose - PF (65 years and older) 12/07/2020, 11/30/2021    Influenza - Trivalent - PF  (ADULT) 12/15/2015, 10/07/2016, 10/03/2017, 11/14/2017    Influenza A (H1N1) 2009 Monovalent - IM 12/09/2009    Pneumococcal Polysaccharide - 23 Valent 12/10/2021    Zoster Recombinant 06/07/2021, 11/30/2021        - Prevnar given today   - Pneumovax done, date: 12/2021  - Shingles done, date: 2 dose series 2021  - Tetanus not done yet, counseled patient and information provided    RTC 6w with BMP, will call in 1w to FU on pain; further pain management per PCP     DOUGLAS Buckner MD HO-VIII  [employer] [facility] Geriatric Fellow

## 2023-09-13 NOTE — PATIENT INSTRUCTIONS
Sciatica information printed     meloxicam 7.5 daily for 30d  May take Tylenol 1000mg three times a day for seven days     Call the clinic if pain relief is inadequate

## 2023-09-21 ENCOUNTER — TELEPHONE (OUTPATIENT)
Dept: FAMILY MEDICINE | Facility: CLINIC | Age: 73
End: 2023-09-21
Payer: MEDICARE

## 2023-09-21 RX ORDER — GABAPENTIN 300 MG/1
300 CAPSULE ORAL EVERY MORNING
Qty: 30 CAPSULE | Refills: 0 | Status: SHIPPED | OUTPATIENT
Start: 2023-09-21 | End: 2023-10-21

## 2023-09-21 NOTE — TELEPHONE ENCOUNTER
Called pt to assess pain relief with regards to plan at last [facility] visit 2023  Verified  and pt with no allergies to medications     Plan at last visit:   Chronic right-sided low back pain with right-sided sciatica  - continue gabapentin to 600mg at bedtime  - continue acetaminophen 1000 TID, continue duloxetine   - meloxicam 7.5 qD for 30d, advised not to take any other NSAIDS given CKDII  - repeat BMP at FU    Update to plan:   Currently taking gabapentin to 600mg at bedtime  Will add 300mg in the am   Rx to preferred pharmacy on file     Will call in 1w to assess for pain relief Consider meloxicam incr from 7.5mg to 15mg     Pt exhibited teach back     Keep appt 10/25 with [facility]    CE MD Sita [facility]  [facility] Geriatric Fellow

## 2023-09-28 ENCOUNTER — OFFICE VISIT (OUTPATIENT)
Dept: GYNECOLOGY | Facility: CLINIC | Age: 73
End: 2023-09-28
Payer: MEDICARE

## 2023-09-28 VITALS
WEIGHT: 187.63 LBS | RESPIRATION RATE: 18 BRPM | TEMPERATURE: 98 F | OXYGEN SATURATION: 98 % | BODY MASS INDEX: 32.03 KG/M2 | HEART RATE: 84 BPM | HEIGHT: 64 IN | SYSTOLIC BLOOD PRESSURE: 160 MMHG | DIASTOLIC BLOOD PRESSURE: 92 MMHG

## 2023-09-28 DIAGNOSIS — L90.0 LICHEN SCLEROSUS: ICD-10-CM

## 2023-09-28 DIAGNOSIS — Z12.31 VISIT FOR SCREENING MAMMOGRAM: ICD-10-CM

## 2023-09-28 DIAGNOSIS — Z01.419 ENCOUNTER FOR ANNUAL ROUTINE GYNECOLOGICAL EXAMINATION: Primary | ICD-10-CM

## 2023-09-28 PROCEDURE — 3061F NEG MICROALBUMINURIA REV: CPT | Mod: CPTII,,, | Performed by: NURSE PRACTITIONER

## 2023-09-28 PROCEDURE — 3061F PR NEG MICROALBUMINURIA RESULT DOCUMENTED/REVIEW: ICD-10-PCS | Mod: CPTII,,, | Performed by: NURSE PRACTITIONER

## 2023-09-28 PROCEDURE — 3051F PR MOST RECENT HEMOGLOBIN A1C LEVEL 7.0 - < 8.0%: ICD-10-PCS | Mod: CPTII,,, | Performed by: NURSE PRACTITIONER

## 2023-09-28 PROCEDURE — 3080F DIAST BP >= 90 MM HG: CPT | Mod: CPTII,,, | Performed by: NURSE PRACTITIONER

## 2023-09-28 PROCEDURE — 4010F PR ACE/ARB THEARPY RXD/TAKEN: ICD-10-PCS | Mod: CPTII,,, | Performed by: NURSE PRACTITIONER

## 2023-09-28 PROCEDURE — 1101F PR PT FALLS ASSESS DOC 0-1 FALLS W/OUT INJ PAST YR: ICD-10-PCS | Mod: CPTII,,, | Performed by: NURSE PRACTITIONER

## 2023-09-28 PROCEDURE — G0101 PR CA SCREEN;PELVIC/BREAST EXAM: ICD-10-PCS | Mod: S$PBB,,, | Performed by: NURSE PRACTITIONER

## 2023-09-28 PROCEDURE — 3008F PR BODY MASS INDEX (BMI) DOCUMENTED: ICD-10-PCS | Mod: CPTII,,, | Performed by: NURSE PRACTITIONER

## 2023-09-28 PROCEDURE — 4010F ACE/ARB THERAPY RXD/TAKEN: CPT | Mod: CPTII,,, | Performed by: NURSE PRACTITIONER

## 2023-09-28 PROCEDURE — 3077F PR MOST RECENT SYSTOLIC BLOOD PRESSURE >= 140 MM HG: ICD-10-PCS | Mod: CPTII,,, | Performed by: NURSE PRACTITIONER

## 2023-09-28 PROCEDURE — 3008F BODY MASS INDEX DOCD: CPT | Mod: CPTII,,, | Performed by: NURSE PRACTITIONER

## 2023-09-28 PROCEDURE — G0101 CA SCREEN;PELVIC/BREAST EXAM: HCPCS | Mod: S$PBB,,, | Performed by: NURSE PRACTITIONER

## 2023-09-28 PROCEDURE — 99214 OFFICE O/P EST MOD 30 MIN: CPT | Mod: PBBFAC | Performed by: NURSE PRACTITIONER

## 2023-09-28 PROCEDURE — 1101F PT FALLS ASSESS-DOCD LE1/YR: CPT | Mod: CPTII,,, | Performed by: NURSE PRACTITIONER

## 2023-09-28 PROCEDURE — 3080F PR MOST RECENT DIASTOLIC BLOOD PRESSURE >= 90 MM HG: ICD-10-PCS | Mod: CPTII,,, | Performed by: NURSE PRACTITIONER

## 2023-09-28 PROCEDURE — 3066F PR DOCUMENTATION OF TREATMENT FOR NEPHROPATHY: ICD-10-PCS | Mod: CPTII,,, | Performed by: NURSE PRACTITIONER

## 2023-09-28 PROCEDURE — G0101 CA SCREEN;PELVIC/BREAST EXAM: HCPCS | Mod: PBBFAC | Performed by: NURSE PRACTITIONER

## 2023-09-28 PROCEDURE — 3288F PR FALLS RISK ASSESSMENT DOCUMENTED: ICD-10-PCS | Mod: CPTII,,, | Performed by: NURSE PRACTITIONER

## 2023-09-28 PROCEDURE — 3077F SYST BP >= 140 MM HG: CPT | Mod: CPTII,,, | Performed by: NURSE PRACTITIONER

## 2023-09-28 PROCEDURE — 1159F MED LIST DOCD IN RCRD: CPT | Mod: CPTII,,, | Performed by: NURSE PRACTITIONER

## 2023-09-28 PROCEDURE — 3066F NEPHROPATHY DOC TX: CPT | Mod: CPTII,,, | Performed by: NURSE PRACTITIONER

## 2023-09-28 PROCEDURE — 3051F HG A1C>EQUAL 7.0%<8.0%: CPT | Mod: CPTII,,, | Performed by: NURSE PRACTITIONER

## 2023-09-28 PROCEDURE — 1159F PR MEDICATION LIST DOCUMENTED IN MEDICAL RECORD: ICD-10-PCS | Mod: CPTII,,, | Performed by: NURSE PRACTITIONER

## 2023-09-28 PROCEDURE — 3288F FALL RISK ASSESSMENT DOCD: CPT | Mod: CPTII,,, | Performed by: NURSE PRACTITIONER

## 2023-09-28 RX ORDER — CLOBETASOL PROPIONATE 0.5 MG/G
OINTMENT TOPICAL
Qty: 60 G | Refills: 6 | Status: SHIPPED | OUTPATIENT
Start: 2023-09-28

## 2023-09-28 NOTE — PROGRESS NOTES
"  Subjective:       Patient ID: Marnie Briggs is a 73 y.o. female.    Chief Complaint:  Gynecologic Exam    History of Present Illness  Pt is  (1 stillbirth) here for annual GYN exam. Denies hx of abnormal pap smears. Hx of hysterectomy in  secondary to uterine fibroids. Denies any hot flashes. Denies any vaginal bleeding or discharge. Denies any abdominal or pelvic pain. Denies any urinary or breast complaints. Last MG-22-BIRADS 1. Denies any family hx of breast, uterine, or ovarian cancer. Pt has a hx of lichen sclerosus to the vaginal area and bilateral breast, diagnosed by dermatology with punch biopsy pathology report on 2015 was significant for lichen sclerosis. Denies itching, irritation, she is no longer using Clobetasol PRN. Denies fly hx of breast, ovarian, uterine or colon cancer. Cologuard neg in . [id number]. BP-160/92 took BP medication this AM in the car. No complaints today.    GYN & OB History  No LMP recorded. Patient has had a hysterectomy.     Review of patient's allergies indicates:  No Known Allergies  Past Medical History:   Diagnosis Date    CKD (chronic kidney disease) stage 2, GFR 60-89 ml/min     DM (diabetes mellitus)     HLD (hyperlipidemia)     HTN (hypertension)      OB History    Para Term  AB Living   7 5     1 4   SAB IAB Ectopic Multiple Live Births   1       4      # Outcome Date GA Lbr Blayne/2nd Weight Sex Delivery Anes PTL Lv   7 Para            6       Vag-Spont   JORGE ALBERTO   5 SAB            4 Para         FD   3 Para      Vag-Spont   JORGE ALBERTO   2 Para      Vag-Spont   JORGE ALBERTO   1 Para      Vag-Spont   JORGE ALBERTO        Review of Systems  Review of Systems    Negative except for pertinent findings for positives per HPI     Objective:    Physical Exam    BP (!) 160/92 (BP Location: Right arm, Patient Position: Sitting, BP Method: Medium (Automatic))   Pulse 84   Temp 97.9 °F (36.6 °C) (Oral)   Resp 18   Ht 5' 4" (1.626 m)   Wt 85.1 kg (187 lb 9.6 oz) "   SpO2 98%   BMI 32.20 kg/m²   GENERAL: Alert and oriented x3. No apparent distress.  BREAST: No mass, tenderness or discharge. milan hypopigmented areas approx 6 o'clock.  PELVIC:   Labia: Milan labia majora with hypopigmented skin down to rectal area, agglutination of labia minora, no excoriation or lesions.  Vagina: pale, cystocele not past introitus.  Cervix: Surgically absent  Uterus: Surgically absent  Adnexa: Non-tender, no fullness.  INTEGUMENTARY: Warm and dry.  NEUROLOGIC: She is alert and oriented x3.   PSYCHIATRIC: Cooperative, appropriate mood and affect.    Assessment:       1. Encounter for annual routine gynecological examination    2. Visit for screening mammogram    3. Lichen sclerosus         Plan:   Marnie was seen today for gynecologic exam.    Diagnoses and all orders for this visit:    Encounter for annual routine gynecological examination    Visit for screening mammogram  -     Mammo Digital Screening Bilat w/ Lexx; Future    Lichen sclerosus    Other orders  -     clobetasol 0.05% (TEMOVATE) 0.05 % Oint; Apply nightly x1 month, then every other night x1 month, then 2x/week going forward    Pelvic today, pap deferred d/t hysterectomy  MG ordered  Lichen sclerosis, Clobetasol ointment daily x4 weeks, then every other day x4 weeks then 2x/week. Use taper dose.  No hot water baths, no scented soaps (recommend Dove sensitive skin), no scented washing powder/dryer sheets (recommend Tide/All unscented or Dreft).  Follow up in about 1 year (around 9/28/2024) for annual exam.

## 2023-10-13 DIAGNOSIS — G89.29 CHRONIC RIGHT-SIDED LOW BACK PAIN WITH RIGHT-SIDED SCIATICA: ICD-10-CM

## 2023-10-13 DIAGNOSIS — M54.41 CHRONIC RIGHT-SIDED LOW BACK PAIN WITH RIGHT-SIDED SCIATICA: ICD-10-CM

## 2023-10-16 RX ORDER — MELOXICAM 7.5 MG/1
7.5 TABLET ORAL DAILY
Qty: 30 TABLET | Refills: 0 | OUTPATIENT
Start: 2023-10-16 | End: 2023-11-15

## 2023-10-20 ENCOUNTER — LAB VISIT (OUTPATIENT)
Dept: LAB | Facility: HOSPITAL | Age: 73
End: 2023-10-20
Attending: INTERNAL MEDICINE
Payer: MEDICARE

## 2023-10-20 DIAGNOSIS — N18.9 CHRONIC KIDNEY DISEASE, UNSPECIFIED CKD STAGE: ICD-10-CM

## 2023-10-20 DIAGNOSIS — N18.9 CHRONIC KIDNEY DISEASE, UNSPECIFIED CKD STAGE: Primary | ICD-10-CM

## 2023-10-20 LAB
ALBUMIN SERPL-MCNC: 3.9 G/DL (ref 3.4–4.8)
ALBUMIN/GLOB SERPL: 1.2 RATIO (ref 1.1–2)
ALP SERPL-CCNC: 74 UNIT/L (ref 40–150)
ALT SERPL-CCNC: 17 UNIT/L (ref 0–55)
APPEARANCE UR: CLEAR
AST SERPL-CCNC: 19 UNIT/L (ref 5–34)
BACTERIA #/AREA URNS AUTO: ABNORMAL /HPF
BILIRUB SERPL-MCNC: 0.5 MG/DL
BILIRUB UR QL STRIP.AUTO: NEGATIVE
BUN SERPL-MCNC: 27 MG/DL (ref 9.8–20.1)
CALCIUM SERPL-MCNC: 9.5 MG/DL (ref 8.4–10.2)
CHLORIDE SERPL-SCNC: 103 MMOL/L (ref 98–107)
CO2 SERPL-SCNC: 29 MMOL/L (ref 23–31)
COLOR UR AUTO: ABNORMAL
CREAT SERPL-MCNC: 1.02 MG/DL (ref 0.55–1.02)
GFR SERPLBLD CREATININE-BSD FMLA CKD-EPI: 59 MLS/MIN/1.73/M2
GLOBULIN SER-MCNC: 3.3 GM/DL (ref 2.4–3.5)
GLUCOSE SERPL-MCNC: 167 MG/DL (ref 82–115)
GLUCOSE UR QL STRIP.AUTO: NORMAL
HYALINE CASTS #/AREA URNS LPF: ABNORMAL /LPF
KETONES UR QL STRIP.AUTO: NEGATIVE
LEUKOCYTE ESTERASE UR QL STRIP.AUTO: 75
MUCOUS THREADS URNS QL MICRO: ABNORMAL /LPF
NITRITE UR QL STRIP.AUTO: NEGATIVE
PH UR STRIP.AUTO: 6 [PH]
POTASSIUM SERPL-SCNC: 4.2 MMOL/L (ref 3.5–5.1)
PROT SERPL-MCNC: 7.2 GM/DL (ref 5.8–7.6)
PROT UR QL STRIP.AUTO: NEGATIVE
RBC #/AREA URNS AUTO: ABNORMAL /HPF
RBC UR QL AUTO: NEGATIVE
SODIUM SERPL-SCNC: 141 MMOL/L (ref 136–145)
SP GR UR STRIP.AUTO: 1.02 (ref 1–1.03)
SQUAMOUS #/AREA URNS LPF: ABNORMAL /HPF
UROBILINOGEN UR STRIP-ACNC: NORMAL
WBC #/AREA URNS AUTO: ABNORMAL /HPF

## 2023-10-20 PROCEDURE — 80053 COMPREHEN METABOLIC PANEL: CPT

## 2023-10-20 PROCEDURE — 36415 COLL VENOUS BLD VENIPUNCTURE: CPT

## 2023-10-23 DIAGNOSIS — G89.29 CHRONIC MIDLINE LOW BACK PAIN WITH RIGHT-SIDED SCIATICA: Chronic | ICD-10-CM

## 2023-10-23 DIAGNOSIS — M54.41 CHRONIC MIDLINE LOW BACK PAIN WITH RIGHT-SIDED SCIATICA: Chronic | ICD-10-CM

## 2023-10-24 ENCOUNTER — TELEPHONE (OUTPATIENT)
Dept: INTERNAL MEDICINE | Facility: CLINIC | Age: 73
End: 2023-10-24
Payer: MEDICARE

## 2023-10-24 LAB — BACTERIA UR CULT: ABNORMAL

## 2023-10-24 RX ORDER — GABAPENTIN 600 MG/1
600 TABLET ORAL NIGHTLY
Qty: 180 TABLET | Refills: 1
Start: 2023-10-24

## 2023-10-24 RX ORDER — CEFDINIR 300 MG/1
300 CAPSULE ORAL 2 TIMES DAILY
Qty: 20 CAPSULE | Refills: 0 | Status: SHIPPED | OUTPATIENT
Start: 2023-10-24 | End: 2023-11-06

## 2023-10-24 NOTE — TELEPHONE ENCOUNTER
Notify patient that UTI noted on UA. I did prescribe Cefdinir 300 mg bid x10 days. Patient is to complete all medication and not miss any doses.

## 2023-10-24 NOTE — TELEPHONE ENCOUNTER
Pt notified UA showed UTI and that antibiotic Cefdinir 300mg was prescribed and to take with a meal twice daily for 10 days and to make sure she completes course and not miss any doses. I also encouraged her to be sure to drink plenty of water with antibiotics and to call back if she has any questions or concerns.

## 2023-10-25 RX ORDER — MIRTAZAPINE 15 MG/1
15 TABLET, FILM COATED ORAL NIGHTLY
COMMUNITY
Start: 2023-10-04 | End: 2023-11-06

## 2023-10-26 ENCOUNTER — TELEPHONE (OUTPATIENT)
Dept: NEPHROLOGY | Facility: CLINIC | Age: 73
End: 2023-10-26
Payer: MEDICARE

## 2023-10-27 ENCOUNTER — OFFICE VISIT (OUTPATIENT)
Dept: NEPHROLOGY | Facility: CLINIC | Age: 73
End: 2023-10-27
Payer: MEDICARE

## 2023-10-27 VITALS
TEMPERATURE: 98 F | WEIGHT: 178.13 LBS | HEIGHT: 64 IN | HEART RATE: 75 BPM | RESPIRATION RATE: 18 BRPM | BODY MASS INDEX: 30.41 KG/M2 | SYSTOLIC BLOOD PRESSURE: 118 MMHG | DIASTOLIC BLOOD PRESSURE: 78 MMHG | OXYGEN SATURATION: 97 %

## 2023-10-27 DIAGNOSIS — N18.2 STAGE 2 CHRONIC KIDNEY DISEASE DUE TO TYPE 2 DIABETES MELLITUS: Primary | Chronic | ICD-10-CM

## 2023-10-27 DIAGNOSIS — E11.22 STAGE 2 CHRONIC KIDNEY DISEASE DUE TO TYPE 2 DIABETES MELLITUS: Primary | Chronic | ICD-10-CM

## 2023-10-27 DIAGNOSIS — Z23 FLU VACCINE NEED: ICD-10-CM

## 2023-10-27 PROCEDURE — G0008 ADMIN INFLUENZA VIRUS VAC: HCPCS | Mod: PBBFAC

## 2023-10-27 PROCEDURE — 99215 OFFICE O/P EST HI 40 MIN: CPT | Mod: PBBFAC,25 | Performed by: INTERNAL MEDICINE

## 2023-10-27 RX ORDER — ACETAMINOPHEN 500 MG
500 TABLET ORAL EVERY 6 HOURS PRN
COMMUNITY

## 2023-10-27 RX ORDER — GABAPENTIN 300 MG/1
300 CAPSULE ORAL DAILY
COMMUNITY
End: 2023-11-06

## 2023-10-27 NOTE — PROGRESS NOTES
VSS, patient tolerated Influenza vaccine well, R deltoid; advised of possible pain and to call clinic with any issues.

## 2023-10-27 NOTE — PROGRESS NOTES
"[facility] NEPHROLOGY  OUTPATIENT OFFICE VISIT NOTE    SUBJECTIVE:      HPI: Ms. Briggs is a 71-year-old  female with past medical history of diabetes mellitus type 2 (diagnosed in her 20s), chronic kidney disease, hypertension, dyslipidemia, and obesity.  Presents for follow-up appointment in Nephrology Clinic.     Since her last visit she is doing well. However, she has a UTI but this is being treated. No acute complaints regarding this.       ROS:  Constitutional: no fever, fatigue, weakness  Respiratory: no cough, no wheezing, no shortness of breath  Cardiovascular: no chest pain, no palpitations, no edema  Gastrointestinal: no nausea, vomiting, or diarrhea. No abdominal pain  Genitourinary: no dysuria, no urinary frequency or urgency, no hematuria  Musculoskeletal: +chronic lower back/sciatic pain  Integumentary: no skin rash or abnormal lesion  Neurologic: no headache, no dizziness, no weakness or numbness         OBJECTIVE:     Vital signs:   /78 (BP Location: Right arm, Patient Position: Sitting, BP Method: Large (Automatic))   Pulse 75   Temp 98 °F (36.7 °C) (Oral)   Resp 18   Ht 5' 3.78" (1.62 m)   Wt 80.8 kg (178 lb 1.6 oz)   SpO2 97%   BMI 30.78 kg/m²      Physical Examination:  General: well-developed well-nourished -American woman in no acute distress  Eye: PERRLA, EOMI, clear conjunctiva, eyelids normal  HENT: NC/AT, moist mucus membranes  Neck: full range of motion, no JVD  Respiratory: clear to auscultation bilaterally, respirations non-labored  Cardiovascular: regular rate and rhythm without murmurs, gallops or rubs  Gastrointestinal: soft, non-tender, non-distended with normal bowel sounds, without masses to palpation  Genitourinary: no CVA tenderness to palpation  Musculoskeletal: no obvious deformities, full range of motion of all extremities/spine without limitation, minimal discomfort noted with flexion  Integumentary: no rashes or skin lesions " present  Extremities: radial and DP pulses 2+ bilaterally, no LE edema  Neurologic: CN II-XII intact, no signs of peripheral neurological deficit, motor/sensory function intact         ASSESSMENT & PLAN:     CKD Stage II (GFR >60)  - BUN/Cr doing really well  - Urine protein/creatinine ratio WNL  - Vit D 55.7, intact PTH 64  - Continue:    -follow 2 g a day dietary sodium restriction    -controlled diabetes (goal A1c less than 7%)    -control high blood pressure (goal blood pressure is less than 130/80, please check blood pressure twice a week and bring blood pressure logs to office visit)    -exercise at least 30 minutes a day, 5 days a week    -maintain healthy weight    -decrease or stop alcohol use    -do not smoke    -stay well hydrated (drink water only, avoid juices, sweet tea, and sodas)    -ask about staying up-to-date on vaccinations (flu vaccine, pneumonia vaccine, hepatitis B vaccine)    -avoid excessive use of NSAIDs (ibuprofen, naproxen, Aleve, Advil, Toradol, Mobic), take Tylenol as needed for headache or mild pain    -take cholesterol lowering medications if prescribed (LDL goal less than 100)    Diabetes Mellitus type II  - A1c 7. Well controlled for age  - Continue on diabetic regimen per PCP   - Educated on healthy diet and exercise     HTN  - /78 AT GOAL  - Continue on Lisinopril and coreg  - Educated on DASH diet and exercise     HLD  - Continue to monitor, LDL at goal <70  - Last LDL 45    Obesity  - Encouraged healthy low fat diet and exercise   - Continue to monitor     UTI  - Primary Provider is treating with Cefdinir started on 10/24    Chip Marcos DO  Internal Medicine - PGY-3

## 2023-11-03 ENCOUNTER — TELEPHONE (OUTPATIENT)
Dept: BEHAVIORAL HEALTH | Facility: CLINIC | Age: 73
End: 2023-11-03
Payer: MEDICARE

## 2023-11-03 NOTE — TELEPHONE ENCOUNTER
Received a med refill for Mirtazapine 15 mg from [facility]. Unable to contact pt, but I did leave a message for a rtn call. Pt needs to schedule an appt, last office visit was 2/16/23

## 2023-11-06 ENCOUNTER — OFFICE VISIT (OUTPATIENT)
Dept: FAMILY MEDICINE | Facility: CLINIC | Age: 73
End: 2023-11-06
Payer: MEDICARE

## 2023-11-06 VITALS
RESPIRATION RATE: 20 BRPM | HEART RATE: 79 BPM | OXYGEN SATURATION: 100 % | TEMPERATURE: 98 F | DIASTOLIC BLOOD PRESSURE: 68 MMHG | HEIGHT: 64 IN | BODY MASS INDEX: 30.98 KG/M2 | SYSTOLIC BLOOD PRESSURE: 109 MMHG | WEIGHT: 181.44 LBS

## 2023-11-06 DIAGNOSIS — M54.41 CHRONIC RIGHT-SIDED LOW BACK PAIN WITH RIGHT-SIDED SCIATICA: Chronic | ICD-10-CM

## 2023-11-06 DIAGNOSIS — M54.41 CHRONIC MIDLINE LOW BACK PAIN WITH RIGHT-SIDED SCIATICA: Chronic | ICD-10-CM

## 2023-11-06 DIAGNOSIS — Z23 ENCOUNTER FOR VACCINATION: ICD-10-CM

## 2023-11-06 DIAGNOSIS — Z23 NEED FOR TETANUS BOOSTER: ICD-10-CM

## 2023-11-06 DIAGNOSIS — M51.36 BULGING LUMBAR DISC: Primary | ICD-10-CM

## 2023-11-06 DIAGNOSIS — Z23 NEED FOR VACCINATION: ICD-10-CM

## 2023-11-06 DIAGNOSIS — E11.22 STAGE 2 CHRONIC KIDNEY DISEASE DUE TO TYPE 2 DIABETES MELLITUS: Chronic | ICD-10-CM

## 2023-11-06 DIAGNOSIS — N18.2 STAGE 2 CHRONIC KIDNEY DISEASE DUE TO TYPE 2 DIABETES MELLITUS: Chronic | ICD-10-CM

## 2023-11-06 DIAGNOSIS — Z91.199 NONADHERENCE TO MEDICAL TREATMENT: ICD-10-CM

## 2023-11-06 DIAGNOSIS — G89.29 CHRONIC RIGHT-SIDED LOW BACK PAIN WITH RIGHT-SIDED SCIATICA: Chronic | ICD-10-CM

## 2023-11-06 DIAGNOSIS — R29.6 FREQUENT FALLS: ICD-10-CM

## 2023-11-06 DIAGNOSIS — G89.29 CHRONIC MIDLINE LOW BACK PAIN WITH RIGHT-SIDED SCIATICA: Chronic | ICD-10-CM

## 2023-11-06 DIAGNOSIS — Z76.0 MEDICATION REFILL: ICD-10-CM

## 2023-11-06 PROCEDURE — 99215 OFFICE O/P EST HI 40 MIN: CPT | Mod: PBBFAC | Performed by: FAMILY MEDICINE
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
## 2023-11-06 NOTE — PATIENT INSTRUCTIONS
Continue gabapentin  600mg pm, refilled 300mg  for am  Continue acetaminophen 1000 TID, duloxetine 30mg BID  refilled meloxicam 7.5 daily for 30d  Advised to do home exercise program   Advised to use back brace   Advised to use walker     Placed a home health order

## 2023-11-06 NOTE — PROGRESS NOTES
Date of Service: 7/12/2023    Attending Attestation: Patient discussed with geriatric fellow Dr. Contreras . The chart was reviewed thoroughly including pertinent vitals, labs, imaging, medications, prior notes, and consultant/specialist recommendations.  I participated in the management of the patient, examined the patient, reviewed the summary of the plan, and was immediately available at all times throughout the encounter. Services were furnished in a primary care center located in the outpatient department of a teaching hospital. I agree with the fellow's findings and plan as documented in their note.    Kenzie Blackman MD  Attending - Family Medicine / Geriatric Medicine  [facility] [place], [facility]

## 2023-11-13 RX ORDER — GABAPENTIN 300 MG/1
300 CAPSULE ORAL DAILY
Qty: 30 CAPSULE | Refills: 0 | Status: SHIPPED | OUTPATIENT
Start: 2023-11-13 | End: 2023-12-15 | Stop reason: DRUGHIGH

## 2023-11-13 RX ORDER — MELOXICAM 7.5 MG/1
7.5 TABLET ORAL DAILY
Qty: 30 TABLET | Refills: 0 | Status: SHIPPED | OUTPATIENT
Start: 2023-11-13 | End: 2023-12-15 | Stop reason: SDUPTHER

## 2023-11-14 RX ORDER — METHOCARBAMOL 500 MG/1
500 TABLET, FILM COATED ORAL DAILY PRN
Qty: 30 TABLET | Refills: 0 | Status: SHIPPED | OUTPATIENT
Start: 2023-11-14 | End: 2023-12-06

## 2023-11-15 DIAGNOSIS — G89.29 CHRONIC RIGHT-SIDED LOW BACK PAIN WITH RIGHT-SIDED SCIATICA: Chronic | ICD-10-CM

## 2023-11-15 DIAGNOSIS — M54.41 CHRONIC RIGHT-SIDED LOW BACK PAIN WITH RIGHT-SIDED SCIATICA: Chronic | ICD-10-CM

## 2023-11-16 RX ORDER — MELOXICAM 7.5 MG/1
7.5 TABLET ORAL DAILY
Qty: 30 TABLET | Refills: 0 | OUTPATIENT
Start: 2023-11-16 | End: 2023-12-16

## 2023-11-27 ENCOUNTER — OFFICE VISIT (OUTPATIENT)
Dept: OPHTHALMOLOGY | Facility: CLINIC | Age: 73
End: 2023-11-27
Payer: MEDICARE

## 2023-11-27 VITALS — HEIGHT: 63 IN | WEIGHT: 181 LBS | BODY MASS INDEX: 32.07 KG/M2

## 2023-11-27 DIAGNOSIS — H35.372 EPIRETINAL MEMBRANE (ERM) OF LEFT EYE: Primary | ICD-10-CM

## 2023-11-27 DIAGNOSIS — H33.102 RETINOSCHISIS OF LEFT EYE: ICD-10-CM

## 2023-11-27 DIAGNOSIS — E11.9 TYPE 2 DIABETES MELLITUS WITHOUT RETINOPATHY: ICD-10-CM

## 2023-11-27 DIAGNOSIS — Z96.1 PSEUDOPHAKIA, BOTH EYES: ICD-10-CM

## 2023-11-27 PROCEDURE — 99213 OFFICE O/P EST LOW 20 MIN: CPT | Mod: PBBFAC,PN

## 2023-11-27 PROCEDURE — 92134 CPTRZ OPH DX IMG PST SGM RTA: CPT | Mod: PBBFAC,PN | Performed by: OPHTHALMOLOGY

## 2023-11-27 PROCEDURE — 92134 CPTRZ OPH DX IMG PST SGM RTA: CPT | Mod: PBBFAC,PN

## 2023-11-27 NOTE — PROGRESS NOTES
Date of Service: 9/13/2023    Attending Attestation: Patient discussed with geriatric fellow Dr. DELFINO Buckner . The chart was reviewed thoroughly including pertinent vitals, labs, imaging, medications, prior notes, and consultant/specialist recommendations.  I participated in the management of the patient, examined the patient, reviewed the summary of the plan, and was immediately available at all times throughout the encounter. Services were furnished in a primary care center located in the outpatient department of a teaching hospital. I agree with the fellow's findings and plan as documented in their note.    Kenzie Blackman MD  Attending - Family Medicine / Geriatric Medicine  [facility] [place], [facility]

## 2023-11-29 ENCOUNTER — TELEPHONE (OUTPATIENT)
Dept: FAMILY MEDICINE | Facility: CLINIC | Age: 73
End: 2023-11-29
Payer: MEDICARE

## 2023-11-29 NOTE — TELEPHONE ENCOUNTER
[facility] need orders for the patient's diabetic supplies and they want the order to include how many times she has to test blood sugars.    Brittney Rolon RN    [facility]

## 2023-12-01 ENCOUNTER — HOSPITAL ENCOUNTER (OUTPATIENT)
Dept: RADIOLOGY | Facility: HOSPITAL | Age: 73
Discharge: HOME | End: 2023-12-01
Attending: NURSE PRACTITIONER
Payer: MEDICARE

## 2023-12-01 DIAGNOSIS — Z12.31 VISIT FOR SCREENING MAMMOGRAM: ICD-10-CM

## 2023-12-01 PROCEDURE — 77067 SCR MAMMO BI INCL CAD: CPT | Mod: TC

## 2023-12-01 PROCEDURE — 77063 MAMMO DIGITAL SCREENING BILAT WITH TOMO: ICD-10-PCS | Mod: 26,,, | Performed by: RADIOLOGY

## 2023-12-01 PROCEDURE — 77067 SCR MAMMO BI INCL CAD: CPT | Mod: 26,,, | Performed by: RADIOLOGY

## 2023-12-01 PROCEDURE — 77067 MAMMO DIGITAL SCREENING BILAT WITH TOMO: ICD-10-PCS | Mod: 26,,, | Performed by: RADIOLOGY

## 2023-12-01 PROCEDURE — 77063 BREAST TOMOSYNTHESIS BI: CPT | Mod: 26,,, | Performed by: RADIOLOGY

## 2023-12-06 ENCOUNTER — OFFICE VISIT (OUTPATIENT)
Dept: FAMILY MEDICINE | Facility: CLINIC | Age: 73
End: 2023-12-06
Payer: MEDICARE

## 2023-12-06 ENCOUNTER — HOSPITAL ENCOUNTER (OUTPATIENT)
Dept: RADIOLOGY | Facility: HOSPITAL | Age: 73
Discharge: HOME | End: 2023-12-06
Attending: STUDENT IN AN ORGANIZED HEALTH CARE EDUCATION/TRAINING PROGRAM
Payer: MEDICARE

## 2023-12-06 VITALS
WEIGHT: 177.69 LBS | HEART RATE: 78 BPM | SYSTOLIC BLOOD PRESSURE: 138 MMHG | BODY MASS INDEX: 31.48 KG/M2 | TEMPERATURE: 98 F | OXYGEN SATURATION: 97 % | HEIGHT: 63 IN | RESPIRATION RATE: 20 BRPM | DIASTOLIC BLOOD PRESSURE: 84 MMHG

## 2023-12-06 DIAGNOSIS — Z79.4 TYPE 2 DIABETES MELLITUS WITH STAGE 2 CHRONIC KIDNEY DISEASE, WITH LONG-TERM CURRENT USE OF INSULIN: Chronic | ICD-10-CM

## 2023-12-06 DIAGNOSIS — M54.41 CHRONIC RIGHT-SIDED LOW BACK PAIN WITH RIGHT-SIDED SCIATICA: Chronic | ICD-10-CM

## 2023-12-06 DIAGNOSIS — I65.23 BILATERAL CAROTID ARTERY STENOSIS: ICD-10-CM

## 2023-12-06 DIAGNOSIS — R55 SYNCOPE AND COLLAPSE: ICD-10-CM
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
## 2023-12-06 DIAGNOSIS — G89.29 CHRONIC RIGHT-SIDED LOW BACK PAIN WITH RIGHT-SIDED SCIATICA: Chronic | ICD-10-CM

## 2023-12-06 DIAGNOSIS — I10 ESSENTIAL HYPERTENSION: Primary | Chronic | ICD-10-CM

## 2023-12-06 DIAGNOSIS — N18.2 TYPE 2 DIABETES MELLITUS WITH STAGE 2 CHRONIC KIDNEY DISEASE, WITH LONG-TERM CURRENT USE OF INSULIN: Chronic | ICD-10-CM

## 2023-12-06 DIAGNOSIS — E11.22 TYPE 2 DIABETES MELLITUS WITH STAGE 2 CHRONIC KIDNEY DISEASE, WITH LONG-TERM CURRENT USE OF INSULIN: Chronic | ICD-10-CM

## 2023-12-06 DIAGNOSIS — E78.00 HYPERCHOLESTEROLEMIA: Chronic | ICD-10-CM

## 2023-12-06 LAB — HBA1C MFR BLD: 8.6 %

## 2023-12-06 PROCEDURE — 99214 OFFICE O/P EST MOD 30 MIN: CPT | Mod: PBBFAC,25 | Performed by: FAMILY MEDICINE

## 2023-12-06 PROCEDURE — 93880 EXTRACRANIAL BILAT STUDY: CPT

## 2023-12-06 PROCEDURE — 83036 HEMOGLOBIN GLYCOSYLATED A1C: CPT | Mod: PBBFAC | Performed by: FAMILY MEDICINE

## 2023-12-06 RX ORDER — TRAMADOL HYDROCHLORIDE 50 MG/1
50 TABLET ORAL NIGHTLY
Qty: 30 TABLET | Refills: 0 | Status: SHIPPED | OUTPATIENT
Start: 2023-12-06 | End: 2024-01-05

## 2023-12-06 NOTE — PROGRESS NOTES
[facility]  [facility] Note    DOS: 12/6/2023      Subjective:  Chief Complaint:    Chief Complaint   Patient presents with    Diabetes     1 month FU       History of Present Illness:  Marnie Briggs is a 73 y.o. 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 Last seen 11/6/23.     Referred to geriatrics clinic initially for frequent falls and polypharmacy back in 7/2023. Actos, Bentyl, remeron have since been discontinued. Gabapentin has been down titrated. She has not had issues with falls since     Acute: None other than her issues with chronic back pain detailed below. Feeling well otherwise.     Frequent falls:   No issues since medication adjustments, no recent falls, feels steady on feet   Using walker at home as needed    Had cardiology work up consisting of   ECHO 7/2023 w/ EF 59%   Carotid US performed today, results pending   Had 30 day Holter monitor w/ no significant arrhythmia reported   Has evaluation with cardiology next month for routine    Lumbar back pain, chronic:   Lumbar disc disease:   Foraminal stenosis:    Gabapentin 300 daily, 600 PM   Mobic 7.5mg daily, tylenol   Robaxin 500mg daily PRN  Still having daily, right sided back pain, 8/10 w/ intermittent right sided sciatica   Worse w/ prolonged sitting and standing   Completed formal PT previously, does not wish to pursue further   Has tried tramadol in the past with moderate relief   No bladder/bowel incontinence or saddle anesthesia     HTN:   Lisinopril 10, Coreg     HLD:   On high intensity statin therapy, no issues   Zetia 10mg   Recent lipid panel reviewed     DMII:   Lantus 26U qPM, metformin 1000BID   Last A1c 8/2023 was 7.0%   Not checking CBG regularly, ran out of test strips which she usually obtains through [facility]   Hx of hypoglycemia, Actos was discontinued  No issues since, no  symptoms of hypoglycemia     Verónica assessment:  No recent falls  Appetite is good  Sleep is good  Bowel/bladder normal and has no complaints  ADLs/iADLS - independent   Driving w/o issues  ambulates indep, has walker at home for support   Vision w/o glasses etc  No hearing aids   No dentures  Memory, no changes   Mood has been happy   no behavioral changes  lives alone, kids come qD with food   Goes to  on Aging three to four times a week    Past Medical History:   Diagnosis Date    CKD (chronic kidney disease) stage 2, GFR 60-89 ml/min     DM (diabetes mellitus)     HLD (hyperlipidemia)     HTN (hypertension)       Past Surgical History:   Procedure Laterality Date    ANKLE FUSION Right     CATARACT EXTRACTION Bilateral     CHOLECYSTECTOMY      COLONOSCOPY  11/30/2015    HYSTERECTOMY      NECK SURGERY      stint      Right kidney      Family History   Problem Relation Age of Onset    Diabetes Mother     Stroke Mother     Hypertension Father     Diabetes Father     Heart disease Father     Cancer Brother     Diabetes Brother       Social History     Socioeconomic History    Marital status:     Number of children: 4   Occupational History    Occupation: disabled   Tobacco Use    Smoking status: Never     Passive exposure: Never    Smokeless tobacco: Never   Substance and Sexual Activity    Alcohol use: Never    Drug use: Never    Sexual activity: Not Currently     Partners: Male     Social Determinants of Health     Financial Resource Strain: Low Risk  (7/12/2023)    Overall Financial Resource Strain (CARDIA)     Difficulty of Paying Living Expenses: Not hard at all   Food Insecurity: No Food Insecurity (7/12/2023)    Hunger Vital Sign     Worried About Running Out of Food in the Last Year: Never true     Ran Out of Food in the Last Year: Never true   Transportation Needs: No Transportation Needs (7/12/2023)    PRAPARE - Transportation     Lack of Transportation (Medical): No     Lack of Transportation  (Non-Medical): No   Physical Activity: Inactive (7/12/2023)    Exercise Vital Sign     Days of Exercise per Week: 0 days     Minutes of Exercise per Session: 0 min   Stress: No Stress Concern Present (7/12/2023)    [employer] [place] [employer] - Occupational Stress Questionnaire     Feeling of Stress : Not at all   Social Connections: Moderately Integrated (7/12/2023)    Social Connection and Isolation Panel [NHANES]     Frequency of Communication with Friends and Family: Three times a week     Frequency of Social Gatherings with Friends and Family: More than three times a week     Attends Confucianist Services: More than 4 times per year     Active Member of Clubs or Organizations: No     Attends Club or Organization Meetings: More than 4 times per year     Marital Status:    Housing Stability: Low Risk  (7/12/2023)    Housing Stability Vital Sign     Unable to Pay for Housing in the Last Year: No     Number of Places Lived in the Last Year: 1     Unstable Housing in the Last Year: No        Health Maintenance Reviewed:  Immunization History   Administered Date(s) Administered    COVID-19 Vaccine 01/03/2022, 07/28/2022    COVID-19, MRNA, LN-S, PF (MODERNA FULL 0.5 ML DOSE) 03/19/2021, 04/16/2021    COVID-19, mRNA, LNP-S, bivalent booster, PF (Moderna Omicron)12 + YEARS 04/19/2023    Influenza 10/08/2012, 10/11/2013, 11/17/2014    Influenza (FLUAD) - Quadrivalent - Adjuvanted - PF *Preferred* (65+) 11/18/2022, 10/27/2023    Influenza (FLUBLOK) - Quadrivalent - Recombinant - PF *Preferred* (egg allergy) 10/21/2019    Influenza - High Dose - PF (65 years and older) 10/24/2018    Influenza - Quadrivalent - High Dose - PF (65 years and older) 12/07/2020, 11/30/2021    Influenza - Trivalent - PF (ADULT) 12/15/2015, 10/07/2016, 10/03/2017, 11/14/2017    Influenza A (H1N1) 2009 Monovalent - IM 12/09/2009    Pneumococcal Conjugate - 20 Valent 09/13/2023    Pneumococcal Polysaccharide - 23 Valent 12/10/2021  "   Zoster Recombinant 06/07/2021, 11/30/2021      Review of patient's allergies indicates:  No Known Allergies       Current Outpatient Medications:     acetaminophen (TYLENOL) 500 MG tablet, Take 500 mg by mouth every 6 (six) hours as needed for Pain., Disp: , Rfl:     aspirin 81 MG Chew, Take 81 mg by mouth once daily at 6am., Disp: , Rfl:     atorvastatin (LIPITOR) 80 MG tablet, Take 1 tablet (80 mg total) by mouth once daily., Disp: 90 tablet, Rfl: 2    carvediloL (COREG) 12.5 MG tablet, Take 1 tablet (12.5 mg total) by mouth 2 (two) times daily., Disp: 180 tablet, Rfl: 2    clobetasol 0.05% (TEMOVATE) 0.05 % Oint, Apply nightly x1 month, then every other night x1 month, then 2x/week going forward, Disp: 60 g, Rfl: 6    ezetimibe (ZETIA) 10 mg tablet, Take 1 tablet (10 mg total) by mouth once daily., Disp: 90 tablet, Rfl: 3    gabapentin (NEURONTIN) 300 MG capsule, Take 1 capsule (300 mg total) by mouth once daily., Disp: 30 capsule, Rfl: 0    gabapentin (NEURONTIN) 600 MG tablet, Take 1 tablet (600 mg total) by mouth every evening., Disp: 180 tablet, Rfl: 1    LANTUS SOLOSTAR U-100 INSULIN glargine 100 units/mL SubQ pen, Inject 26 Units into the skin once daily., Disp: 23.4 mL, Rfl: 2    lisinopriL 10 MG tablet, Take 1 tablet (10 mg total) by mouth once daily., Disp: 90 tablet, Rfl: 2    loratadine (CLARITIN) 10 mg tablet, Take 10 mg by mouth once daily., Disp: , Rfl:     meloxicam (MOBIC) 7.5 MG tablet, Take 1 tablet (7.5 mg total) by mouth once daily., Disp: 30 tablet, Rfl: 0    metFORMIN (GLUCOPHAGE) 1000 MG tablet, Take 1 tablet (1,000 mg total) by mouth 2 (two) times daily with meals., Disp: 180 tablet, Rfl: 2    multivitamin capsule, Take 1 capsule by mouth once daily., Disp: , Rfl:     omeprazole (PRILOSEC) 40 MG capsule, Take 1 capsule (40 mg total) by mouth once daily., Disp: 90 capsule, Rfl: 2    pen needle, diabetic 29 gauge x 1/2" Renee,  Insulin pen needles, See Instructions, [place] pen needles for " "once a day Lantus injection  E11.65, # 90 EA, 3 Refill(s), Pharmacy: [id number], 162, cm, Height/Length Dosing, 12/10/21 9:34:00 CST, 90.2, kg, Weight Dosing, 12/10/21 9:34:00 CST, Disp: , Rfl:     vitamin D (VITAMIN D3) 1000 units Tab, Take 1,000 Units by mouth once daily., Disp: , Rfl:      Review of Systems   Constitutional:  Negative for fever.   Respiratory:  Negative for shortness of breath.    Cardiovascular:  Negative for chest pain, palpitations and leg swelling.   Gastrointestinal:  Negative for abdominal pain.   Neurological:  Negative for dizziness and headaches.        Objective:   Vitals:    12/06/23 1125 12/06/23 1128   BP: (!) 141/92 138/84   BP Location: Left arm Left arm   Patient Position: Sitting Sitting   BP Method: Large (Automatic) Large (Manual)   Pulse: 78    Resp: 20    Temp: 98.4 °F (36.9 °C)    TempSrc: Oral    SpO2: 97%    Weight: 80.6 kg (177 lb 11.1 oz)    Height: 5' 2.99" (1.6 m)         Physical Exam  Constitutional:       General: She is not in acute distress.     Appearance: She is not toxic-appearing.   Eyes:      Conjunctiva/sclera: Conjunctivae normal.   Cardiovascular:      Rate and Rhythm: Normal rate and regular rhythm.      Heart sounds: No murmur heard.     No friction rub. No gallop.   Pulmonary:      Effort: Pulmonary effort is normal. No respiratory distress.      Breath sounds: Normal breath sounds. No wheezing or rales.   Abdominal:      General: Abdomen is flat. Bowel sounds are normal. There is no distension.      Palpations: Abdomen is soft.      Tenderness: There is no abdominal tenderness.   Musculoskeletal:      Right lower leg: No edema.      Left lower leg: No edema.      Comments: Minimal tenderness, right lumbar paraspinal      Skin:     General: Skin is warm.   Neurological:      General: No focal deficit present.      Mental Status: She is alert and oriented to person, place, and time.          Depression Assessment:       12/6/2023    11:21 AM " 12/6/2023    11:20 AM 11/27/2023    10:31 AM 11/6/2023     1:58 PM 10/27/2023     9:51 AM 9/28/2023     7:50 AM 9/13/2023     8:32 AM   Depression Patient Health Questionnaire   Over the last two weeks how often have you been bothered by little interest or pleasure in doing things Not at all Not at all Several days Not at all Several days Not at all Not at all   Over the last two weeks how often have you been bothered by feeling down, depressed or hopeless Not at all Not at all Several days Several days Several days Not at all Not at all   PHQ-2 Total Score 0 0 2 1 2 0 0   Over the last two weeks how often have you been bothered by trouble falling or staying asleep, or sleeping too much More than half the days Several days        Over the last two weeks how often have you been bothered by feeling tired or having little energy Not at all Not at all        Over the last two weeks how often have you been bothered by a poor appetite or overeating Not at all Not at all        Over the last two weeks how often have you been bothered by feeling bad about yourself - or that you are a failure or have let yourself or your family down Not at all Not at all          Recent labs:  CBC:  Lab Results   Component Value Date    WBC 8.28 08/07/2023    RBC 4.23 08/07/2023    HGB 11.8 (L) 08/07/2023    HCT 38.4 08/07/2023    MCV 90.8 08/07/2023    MCH 27.9 08/07/2023    MCHC 30.7 (L) 08/07/2023    RDW 15.0 08/07/2023     08/07/2023    MPV 9.8 08/07/2023      CMP:  Sodium Level   Date Value Ref Range Status   10/20/2023 141 136 - 145 mmol/L Final     Potassium Level   Date Value Ref Range Status   10/20/2023 4.2 3.5 - 5.1 mmol/L Final     Carbon Dioxide   Date Value Ref Range Status   10/20/2023 29 23 - 31 mmol/L Final     Blood Urea Nitrogen   Date Value Ref Range Status   10/20/2023 27.0 (H) 9.8 - 20.1 mg/dL Final     Creatinine   Date Value Ref Range Status   10/20/2023 1.02 0.55 - 1.02 mg/dL Final     Calcium Level Total  "  Date Value Ref Range Status   10/20/2023 9.5 8.4 - 10.2 mg/dL Final     Albumin Level   Date Value Ref Range Status   10/20/2023 3.9 3.4 - 4.8 g/dL Final     Bilirubin Total   Date Value Ref Range Status   10/20/2023 0.5 <=1.5 mg/dL Final     Alkaline Phosphatase   Date Value Ref Range Status   10/20/2023 74 40 - 150 unit/L Final     Aspartate Aminotransferase   Date Value Ref Range Status   10/20/2023 19 5 - 34 unit/L Final     Alanine Aminotransferase   Date Value Ref Range Status   10/20/2023 17 0 - 55 unit/L Final     Estimated GFR-Non    Date Value Ref Range Status   03/22/2022 >60        BMP:  Lab Results   Component Value Date     10/20/2023    K 4.2 10/20/2023    CO2 29 10/20/2023    BUN 27.0 (H) 10/20/2023    CREATININE 1.02 10/20/2023    CALCIUM 9.5 10/20/2023    EGFRNONAA >60 03/22/2022      Lipid Panel:  Lab Results   Component Value Date    CHOL 110 08/07/2023    CHOL 120 06/23/2023    CHOL 112 02/06/2023     Lab Results   Component Value Date    HDL 40 08/07/2023    HDL 39 06/23/2023    HDL 43 02/06/2023     No results found for: "LDLCALC"  Lab Results   Component Value Date    TRIG 127 08/07/2023    TRIG 106 06/23/2023    TRIG 72 02/06/2023     No results found for: "CHOLHDL"   HbA1c:  Lab Results   Component Value Date    HGBA1C 7.0 08/07/2023      TSH:  Lab Results   Component Value Date    TSH 2.251 08/07/2023       Recent Imaging:  Mammo Digital Screening Bilat w/ Patrice   - MAMMO DIGITAL SCREENING BILAT WITH PATRICE    BILATERAL DIGITAL SCREENING MAMMOGRAM 3D/2D WITH CAD: 12/1/2023  HISTORY: 73-year-old woman presents for screening mammogram.      COMPARISONS: Comparison is made to exams dated:  11/23/2022 mammogram, 11/22/2021 mammogram, 11/17/2020 mammogram, 10/16/2019 mammogram, 10/15/2018 mammogram - [facility], and 5/18/2016 mammogram - [facility].      TECHNIQUE: Digital mammography views were performed with tomosynthesis. Current study " was evaluated with a Computer Aided Detection (CAD) system.     BREAST COMPOSITION: There are scattered areas of fibroglandular tissue.      FINDINGS:   No suspicious mass, asymmetry, distortion, or calcification is identified.     IMPRESSION: NEGATIVE  Right Breast: Negative (BI-RADS 1)  Left Breast: Negative (BI-RADS 1)    Recommendations:  Recommend continued annual screening mammography, according to American College of Radiology guidelines.    Derrick Romo M.D.            lm/:12/2/2023 12:56:24      letter sent: Mammography Normal    Mammogram BI-RADS: 1 Negative       Assessment & Plan:    Marnie Briggs is presenting as above and will be treated as follows:    1. Essential hypertension  - Well controlled, continue current regimen     2. Hypercholesterolemia  - Continue statin therapy and Zetia   - Lipid panel reviewed     3. Type 2 diabetes mellitus with stage 2 chronic kidney disease, with long-term current use of insulin  - Check A1C   - Continue current regimen, doing well   - Order sent for diagnostic strips to insurance   - Continue home CBG monitoring     4. Chronic right-sided low back pain with right-sided sciatica  - Still with significant pain   - Continue Tylenol   - Discontinue Robaxin   - Will trial her on low dose Tramadol 50mg PRN at bed time, she has tolerated well in the past  - Discussed appropriate use and potential SE and risk w/ opioids  - Offered her HH for PT in the home which she declined   - Continue HEP, topical lidocaine   - Continue gabapentin   - Continue to monitor, does not desire any future surgical management     5. Fall   - No falls recently, gait has been steady   - Significant improvement since medication adjustments   - Cardiac work up reviewed  - Medications reviewed   - Keep evaluation with cardiology in January     RTC 3 months or sooner if needed

## 2023-12-08 ENCOUNTER — TELEPHONE (OUTPATIENT)
Dept: FAMILY MEDICINE | Facility: CLINIC | Age: 73
End: 2023-12-08
Payer: MEDICARE

## 2023-12-08 RX ORDER — LANCETS
1 EACH MISCELLANEOUS 2 TIMES DAILY
Qty: 100 EACH | Refills: 5 | Status: SHIPPED | OUTPATIENT
Start: 2023-12-08

## 2023-12-08 NOTE — TELEPHONE ENCOUNTER
[facility] called requesting an additional order for the patient's lancets.    Brittney Rolon RN    [facility]

## 2023-12-08 NOTE — TELEPHONE ENCOUNTER
Order printed and to be faxed as requested for lancets.     Kenzie Blackman MD  Attending - Family Medicine / Geriatric Medicine  [facility] [place], [facility]

## 2023-12-11 LAB
LEFT CCA DIST DIAS: 15 CM/S
LEFT CCA DIST SYS: 50 CM/S
LEFT CCA PROX DIAS: 18 CM/S
LEFT CCA PROX SYS: 71 CM/S
LEFT ECA DIAS: 13 CM/S
LEFT ECA SYS: 58 CM/S
LEFT ICA DIST DIAS: 28 CM/S
LEFT ICA DIST SYS: 67 CM/S
LEFT ICA MID DIAS: 24 CM/S
LEFT ICA MID SYS: 62 CM/S
LEFT ICA PROX DIAS: 14 CM/S
LEFT ICA PROX SYS: 49 CM/S
LEFT VERTEBRAL DIAS: 19 CM/S
LEFT VERTEBRAL SYS: 61 CM/S
OHS CV CAROTID RIGHT ICA EDV HIGHEST: 31
OHS CV CAROTID ULTRASOUND LEFT ICA/CCA RATIO: 1.34
OHS CV CAROTID ULTRASOUND RIGHT ICA/CCA RATIO: 1.34
OHS CV PV CAROTID LEFT HIGHEST CCA: 71
OHS CV PV CAROTID LEFT HIGHEST ICA: 67
OHS CV PV CAROTID RIGHT HIGHEST CCA: 78
OHS CV PV CAROTID RIGHT HIGHEST ICA: 75
OHS CV US CAROTID LEFT HIGHEST EDV: 28
RIGHT CCA DIST DIAS: 16 CM/S
RIGHT CCA DIST SYS: 56 CM/S
RIGHT CCA PROX DIAS: 11 CM/S
RIGHT CCA PROX SYS: 78 CM/S
RIGHT ECA DIAS: 9 CM/S
RIGHT ECA SYS: 56 CM/S
RIGHT ICA DIST DIAS: 31 CM/S
RIGHT ICA DIST SYS: 75 CM/S
RIGHT ICA MID DIAS: 26 CM/S
RIGHT ICA MID SYS: 67 CM/S
RIGHT ICA PROX DIAS: 12 CM/S
RIGHT ICA PROX SYS: 40 CM/S
RIGHT VERTEBRAL DIAS: 0 CM/S
RIGHT VERTEBRAL SYS: 17 CM/S

## 2023-12-14 NOTE — PROGRESS NOTES
Date of Service: 11/6/2023    Attending Attestation: Patient discussed with geriatric fellow Dr. DELFINO Buckner . The chart was reviewed thoroughly including pertinent vitals, labs, imaging, medications, prior notes, and consultant/specialist recommendations.  I participated in the management of the patient, examined the patient, reviewed the summary of the plan, and was immediately available at all times throughout the encounter. Services were furnished in a primary care center located in the outpatient department of a teaching hospital. I agree with the fellow's findings and plan as documented in their note.    Kenzie Blackman MD  Attending - Family Medicine / Geriatric Medicine  [facility] [place], [facility]

## 2023-12-15 ENCOUNTER — TELEPHONE (OUTPATIENT)
Dept: FAMILY MEDICINE | Facility: CLINIC | Age: 73
End: 2023-12-15
Payer: MEDICARE

## 2023-12-15 ENCOUNTER — OFFICE VISIT (OUTPATIENT)
Dept: FAMILY MEDICINE | Facility: CLINIC | Age: 73
End: 2023-12-15
Payer: MEDICARE

## 2023-12-15 VITALS
WEIGHT: 178.38 LBS | HEIGHT: 63 IN | HEART RATE: 83 BPM | BODY MASS INDEX: 31.61 KG/M2 | OXYGEN SATURATION: 95 % | DIASTOLIC BLOOD PRESSURE: 76 MMHG | TEMPERATURE: 98 F | RESPIRATION RATE: 18 BRPM | SYSTOLIC BLOOD PRESSURE: 122 MMHG

## 2023-12-15 DIAGNOSIS — M25.562 ACUTE BILATERAL KNEE PAIN: ICD-10-CM

## 2023-12-15 DIAGNOSIS — M15.9 PRIMARY OSTEOARTHRITIS INVOLVING MULTIPLE JOINTS: ICD-10-CM

## 2023-12-15 DIAGNOSIS — G62.9 NEUROPATHY: Primary | ICD-10-CM

## 2023-12-15 DIAGNOSIS — M51.36 BULGING LUMBAR DISC: ICD-10-CM

## 2023-12-15 DIAGNOSIS — G89.29 CHRONIC MIDLINE LOW BACK PAIN WITH RIGHT-SIDED SCIATICA: Chronic | ICD-10-CM

## 2023-12-15 DIAGNOSIS — M25.561 ACUTE BILATERAL KNEE PAIN: ICD-10-CM

## 2023-12-15 DIAGNOSIS — M54.41 CHRONIC MIDLINE LOW BACK PAIN WITH RIGHT-SIDED SCIATICA: Chronic | ICD-10-CM

## 2023-12-15 PROCEDURE — 96372 THER/PROPH/DIAG INJ SC/IM: CPT | Mod: PBBFAC

## 2023-12-15 PROCEDURE — 99215 OFFICE O/P EST HI 40 MIN: CPT | Mod: PBBFAC,25

## 2023-12-15 RX ORDER — KETOROLAC TROMETHAMINE 30 MG/ML
15 INJECTION, SOLUTION INTRAMUSCULAR; INTRAVENOUS
Status: COMPLETED | OUTPATIENT
Start: 2023-12-15 | End: 2023-12-15

## 2023-12-15 RX ORDER — GABAPENTIN 600 MG/1
600 TABLET ORAL 2 TIMES DAILY
Qty: 180 TABLET | Refills: 1 | Status: SHIPPED | OUTPATIENT
Start: 2023-12-15

## 2023-12-15 RX ORDER — MELOXICAM 7.5 MG/1
7.5 TABLET ORAL DAILY
Qty: 90 TABLET | Refills: 1 | Status: SHIPPED | OUTPATIENT
Start: 2023-12-15

## 2023-12-15 RX ORDER — HYDROCODONE BITARTRATE AND ACETAMINOPHEN 5; 325 MG/1; MG/1
1 TABLET ORAL EVERY 12 HOURS PRN
Qty: 28 TABLET | Refills: 0 | Status: SHIPPED | OUTPATIENT
Start: 2023-12-15 | End: 2023-12-29

## 2023-12-15 RX ADMIN — KETOROLAC TROMETHAMINE 15 MG: 30 INJECTION, SOLUTION INTRAMUSCULAR; INTRAVENOUS at 10:12

## 2023-12-15 NOTE — TELEPHONE ENCOUNTER
Pharmacy requesting refill on meloxicam, not on current med list so unable to propose. Please send a new Rx or advise if d/c. Thank you!

## 2023-12-15 NOTE — PROGRESS NOTES
[facility]  [facility] Note    DOS: 12/15/2023      Subjective:  Chief Complaint:    Chief Complaint   Patient presents with    Leg Pain       History of Present Illness:  Marnie Briggs is a 73 y.o. 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 Last seen 11/6/23.     Presents for acute visit for worsening acute exacerbation of chronic right leg/knee pain with sciatica.   Patient notes 5 day history of worsening 10/10 intensity, shooting like nerve pain that starts from the right buttock and radiates down into her lower leg, along with knee arthritis pain.  Patient notes pain is worse when sitting when she puts pressure on her buttock, and sometimes she even feels it in left leg too.  She states when she sits too long the legs get heavy and she has pins and needles feeling like it is falling asleep.   Patient currently taking tylenol 1000mg BID, meloxicam 7.5mg qhs, gabapentin 300mg qam/600mg qpm, and tramadol 50mg qhs prn for pain.  She states last night the pain was so severe she took 2 tramadols together at night and it allowed her to sleep.     Referred to geriatrics clinic initially for frequent falls and polypharmacy back in 7/2023. Actos, Bentyl, remeron have since been discontinued. Gabapentin has been down titrated. She has not had issues with falls since     Acute: None other than her issues with chronic back pain detailed below. Feeling well otherwise.     Frequent falls:   No issues since medication adjustments, no recent falls, feels steady on feet   Using walker at home as needed    Had cardiology work up consisting of   ECHO 7/2023 w/ EF 59%   Carotid US performed today, results pending   Had 30 day Holter monitor w/ no significant arrhythmia reported   Has evaluation with cardiology next month for routine    Lumbar back pain, chronic:   Lumbar disc disease:    Foraminal stenosis:    Gabapentin 300 daily, 600 PM   Mobic 7.5mg daily, tylenol   Robaxin 500mg daily PRN  Still having daily, right sided back pain, 8/10 w/ intermittent right sided sciatica   Worse w/ prolonged sitting and standing   Completed formal PT previously, does not wish to pursue further   Has tried tramadol in the past with moderate relief   No bladder/bowel incontinence or saddle anesthesia     HTN:   Lisinopril 10, Coreg     HLD:   On high intensity statin therapy, no issues   Zetia 10mg   Recent lipid panel reviewed     DMII:   Lantus 26U qPM, metformin 1000BID   Last A1c 8/2023 was 7.0%   Not checking CBG regularly, ran out of test strips which she usually obtains through [facility]   Hx of hypoglycemia, Actos was discontinued  No issues since, no symptoms of hypoglycemia     Verónica assessment:  No recent falls  Appetite is good  Sleep is good  Bowel/bladder normal and has no complaints  ADLs/iADLS - independent   Driving w/o issues  ambulates indep, has walker at home for support   Vision w/o glasses etc  No hearing aids   No dentures  Memory, no changes   Mood has been happy   no behavioral changes  lives alone, kids come qD with food   Goes to  on Aging three to four times a week    Past Medical History:   Diagnosis Date    CKD (chronic kidney disease) stage 2, GFR 60-89 ml/min     DM (diabetes mellitus)     HLD (hyperlipidemia)     HTN (hypertension)       Past Surgical History:   Procedure Laterality Date    ANKLE FUSION Right     CATARACT EXTRACTION Bilateral     CHOLECYSTECTOMY      COLONOSCOPY  11/30/2015    HYSTERECTOMY      NECK SURGERY      stint      Right kidney      Family History   Problem Relation Age of Onset    Diabetes Mother     Stroke Mother     Hypertension Father     Diabetes Father     Heart disease Father     Cancer Brother     Diabetes Brother       Social History     Socioeconomic History    Marital status:     Number of children: 4   Occupational History     Occupation: disabled   Tobacco Use    Smoking status: Never     Passive exposure: Never    Smokeless tobacco: Never   Substance and Sexual Activity    Alcohol use: Never    Drug use: Never    Sexual activity: Not Currently     Partners: Male     Social Determinants of Health     Financial Resource Strain: Low Risk  (7/12/2023)    Overall Financial Resource Strain (CARDIA)     Difficulty of Paying Living Expenses: Not hard at all   Food Insecurity: No Food Insecurity (7/12/2023)    Hunger Vital Sign     Worried About Running Out of Food in the Last Year: Never true     Ran Out of Food in the Last Year: Never true   Transportation Needs: No Transportation Needs (7/12/2023)    PRAPARE - Transportation     Lack of Transportation (Medical): No     Lack of Transportation (Non-Medical): No   Physical Activity: Inactive (7/12/2023)    Exercise Vital Sign     Days of Exercise per Week: 0 days     Minutes of Exercise per Session: 0 min   Stress: No Stress Concern Present (7/12/2023)    [employer] [place] [employer] - Occupational Stress Questionnaire     Feeling of Stress : Not at all   Social Connections: Moderately Integrated (7/12/2023)    Social Connection and Isolation Panel [NHANES]     Frequency of Communication with Friends and Family: Three times a week     Frequency of Social Gatherings with Friends and Family: More than three times a week     Attends [employer] Services: More than 4 times per year     Active Member of Clubs or Organizations: No     Attends Club or Organization Meetings: More than 4 times per year     Marital Status:    Housing Stability: Low Risk  (7/12/2023)    Housing Stability Vital Sign     Unable to Pay for Housing in the Last Year: No     Number of Places Lived in the Last Year: 1     Unstable Housing in the Last Year: No        Health Maintenance Reviewed:  Immunization History   Administered Date(s) Administered    COVID-19 Vaccine 01/03/2022, 07/28/2022    COVID-19,  MRNA, LN-S, PF (MODERNA FULL 0.5 ML DOSE) 03/19/2021, 04/16/2021    COVID-19, mRNA, LNP-S, bivalent booster, PF (Moderna Omicron)12 + YEARS 04/19/2023    Influenza 10/08/2012, 10/11/2013, 11/17/2014    Influenza (FLUAD) - Quadrivalent - Adjuvanted - PF *Preferred* (65+) 11/18/2022, 10/27/2023    Influenza (FLUBLOK) - Quadrivalent - Recombinant - PF *Preferred* (egg allergy) 10/21/2019    Influenza - High Dose - PF (65 years and older) 10/24/2018    Influenza - Quadrivalent - High Dose - PF (65 years and older) 12/07/2020, 11/30/2021    Influenza - Trivalent - PF (ADULT) 12/15/2015, 10/07/2016, 10/03/2017, 11/14/2017    Influenza A (H1N1) 2009 Monovalent - IM 12/09/2009    Pneumococcal Conjugate - 20 Valent 09/13/2023    Pneumococcal Polysaccharide - 23 Valent 12/10/2021    Zoster Recombinant 06/07/2021, 11/30/2021      Review of patient's allergies indicates:  No Known Allergies       Current Outpatient Medications:     acetaminophen (TYLENOL) 500 MG tablet, Take 500 mg by mouth every 6 (six) hours as needed for Pain., Disp: , Rfl:     aspirin 81 MG Chew, Take 81 mg by mouth once daily at 6am., Disp: , Rfl:     atorvastatin (LIPITOR) 80 MG tablet, Take 1 tablet (80 mg total) by mouth once daily., Disp: 90 tablet, Rfl: 2    blood sugar diagnostic Strp, 1 strip by Misc.(Non-Drug; Combo Route) route 2 (two) times a day. To check BG 2 times daily (AM morning fasting, and 2 hours after dinner), to use with insurance preferred meter, Disp: 99 each, Rfl: 2    carvediloL (COREG) 12.5 MG tablet, Take 1 tablet (12.5 mg total) by mouth 2 (two) times daily., Disp: 180 tablet, Rfl: 2    clobetasol 0.05% (TEMOVATE) 0.05 % Oint, Apply nightly x1 month, then every other night x1 month, then 2x/week going forward, Disp: 60 g, Rfl: 6    ezetimibe (ZETIA) 10 mg tablet, Take 1 tablet (10 mg total) by mouth once daily., Disp: 90 tablet, Rfl: 3    gabapentin (NEURONTIN) 600 MG tablet, Take 1 tablet (600 mg total) by mouth 2 (two) times  "daily., Disp: 180 tablet, Rfl: 1    HYDROcodone-acetaminophen (NORCO) 5-325 mg per tablet, Take 1 tablet by mouth every 12 (twelve) hours as needed for Pain., Disp: 28 tablet, Rfl: 0    lancets (LANCETS,THIN) Misc, 1 Needle by Misc.(Non-Drug; Combo Route) route 2 (two) times a day., Disp: 100 each, Rfl: 5    LANTUS SOLOSTAR U-100 INSULIN glargine 100 units/mL SubQ pen, Inject 26 Units into the skin once daily., Disp: 23.4 mL, Rfl: 2    lisinopriL 10 MG tablet, Take 1 tablet (10 mg total) by mouth once daily., Disp: 90 tablet, Rfl: 2    loratadine (CLARITIN) 10 mg tablet, Take 10 mg by mouth once daily., Disp: , Rfl:     meloxicam (MOBIC) 7.5 MG tablet, Take 1 tablet (7.5 mg total) by mouth once daily., Disp: 90 tablet, Rfl: 1    metFORMIN (GLUCOPHAGE) 1000 MG tablet, Take 1 tablet (1,000 mg total) by mouth 2 (two) times daily with meals., Disp: 180 tablet, Rfl: 2    multivitamin capsule, Take 1 capsule by mouth once daily., Disp: , Rfl:     omeprazole (PRILOSEC) 40 MG capsule, Take 1 capsule (40 mg total) by mouth once daily., Disp: 90 capsule, Rfl: 2    pen needle, diabetic 29 gauge x 1/2" Ndle,  Insulin pen needles, See Instructions, Inuslin pen needles for once a day Lantus injection  E11.65, # 90 EA, 3 Refill(s), Pharmacy: [id number], 162, cm, Height/Length Dosing, 12/10/21 9:34:00 CST, 90.2, kg, Weight Dosing, 12/10/21 9:34:00 CST, Disp: , Rfl:     traMADoL (ULTRAM) 50 mg tablet, Take 1 tablet (50 mg total) by mouth every evening., Disp: 30 tablet, Rfl: 0    vitamin D (VITAMIN D3) 1000 units Tab, Take 1,000 Units by mouth once daily., Disp: , Rfl:   No current facility-administered medications for this visit.     Review of Systems   Constitutional:  Negative for fever.   Respiratory:  Negative for shortness of breath.    Cardiovascular:  Negative for chest pain, palpitations and leg swelling.   Gastrointestinal:  Negative for abdominal pain.   Neurological:  Negative for dizziness and headaches.    " "    Objective:   Vitals:    12/15/23 0911   BP: 122/76   BP Location: Left arm   Patient Position: Sitting   BP Method: Medium (Automatic)   Pulse: 83   Resp: 18   Temp: 98.1 °F (36.7 °C)   TempSrc: Oral   SpO2: 95%   Weight: 80.9 kg (178 lb 5.6 oz)   Height: 5' 2.99" (1.6 m)        Physical Exam  Constitutional:       General: She is not in acute distress.     Appearance: She is not toxic-appearing.   Eyes:      Conjunctiva/sclera: Conjunctivae normal.   Cardiovascular:      Rate and Rhythm: Normal rate and regular rhythm.      Heart sounds: No murmur heard.     No friction rub. No gallop.   Pulmonary:      Effort: Pulmonary effort is normal. No respiratory distress.      Breath sounds: Normal breath sounds. No wheezing or rales.   Abdominal:      General: Abdomen is flat. Bowel sounds are normal. There is no distension.      Palpations: Abdomen is soft.      Tenderness: There is no abdominal tenderness.   Musculoskeletal:      Right lower leg: No edema.      Left lower leg: No edema.      Comments: Minimal tenderness, right lumbar paraspinal      Skin:     General: Skin is warm.   Neurological:      General: No focal deficit present.      Mental Status: She is alert and oriented to person, place, and time.        Depression Assessment:       12/15/2023     9:11 AM 12/6/2023    11:21 AM 12/6/2023    11:20 AM 11/27/2023    10:31 AM 11/6/2023     1:58 PM 10/27/2023     9:51 AM 9/28/2023     7:50 AM   Depression Patient Health Questionnaire   Over the last two weeks how often have you been bothered by little interest or pleasure in doing things Not at all Not at all Not at all Several days Not at all Several days Not at all   Over the last two weeks how often have you been bothered by feeling down, depressed or hopeless Not at all Not at all Not at all Several days Several days Several days Not at all   PHQ-2 Total Score 0 0 0 2 1 2 0   Over the last two weeks how often have you been bothered by trouble falling or " staying asleep, or sleeping too much  More than half the days Several days       Over the last two weeks how often have you been bothered by feeling tired or having little energy  Not at all Not at all       Over the last two weeks how often have you been bothered by a poor appetite or overeating  Not at all Not at all       Over the last two weeks how often have you been bothered by feeling bad about yourself - or that you are a failure or have let yourself or your family down  Not at all Not at all         Recent labs:  CBC:  Lab Results   Component Value Date    WBC 8.28 08/07/2023    RBC 4.23 08/07/2023    HGB 11.8 (L) 08/07/2023    HCT 38.4 08/07/2023    MCV 90.8 08/07/2023    MCH 27.9 08/07/2023    MCHC 30.7 (L) 08/07/2023    RDW 15.0 08/07/2023     08/07/2023    MPV 9.8 08/07/2023      CMP:  Sodium Level   Date Value Ref Range Status   10/20/2023 141 136 - 145 mmol/L Final     Potassium Level   Date Value Ref Range Status   10/20/2023 4.2 3.5 - 5.1 mmol/L Final     Carbon Dioxide   Date Value Ref Range Status   10/20/2023 29 23 - 31 mmol/L Final     Blood Urea Nitrogen   Date Value Ref Range Status   10/20/2023 27.0 (H) 9.8 - 20.1 mg/dL Final     Creatinine   Date Value Ref Range Status   10/20/2023 1.02 0.55 - 1.02 mg/dL Final     Calcium Level Total   Date Value Ref Range Status   10/20/2023 9.5 8.4 - 10.2 mg/dL Final     Albumin Level   Date Value Ref Range Status   10/20/2023 3.9 3.4 - 4.8 g/dL Final     Bilirubin Total   Date Value Ref Range Status   10/20/2023 0.5 <=1.5 mg/dL Final     Alkaline Phosphatase   Date Value Ref Range Status   10/20/2023 74 40 - 150 unit/L Final     Aspartate Aminotransferase   Date Value Ref Range Status   10/20/2023 19 5 - 34 unit/L Final     Alanine Aminotransferase   Date Value Ref Range Status   10/20/2023 17 0 - 55 unit/L Final     Estimated GFR-Non    Date Value Ref Range Status   03/22/2022 >60        BMP:  Lab Results   Component Value Date     " 10/20/2023    K 4.2 10/20/2023    CO2 29 10/20/2023    BUN 27.0 (H) 10/20/2023    CREATININE 1.02 10/20/2023    CALCIUM 9.5 10/20/2023    EGFRNONAA >60 03/22/2022      Lipid Panel:  Lab Results   Component Value Date    CHOL 110 08/07/2023    CHOL 120 06/23/2023    CHOL 112 02/06/2023     Lab Results   Component Value Date    HDL 40 08/07/2023    HDL 39 06/23/2023    HDL 43 02/06/2023     No results found for: "LDLCALC"  Lab Results   Component Value Date    TRIG 127 08/07/2023    TRIG 106 06/23/2023    TRIG 72 02/06/2023     No results found for: "CHOLHDL"   HbA1c:  Lab Results   Component Value Date    HGBA1C 7.0 08/07/2023      TSH:  Lab Results   Component Value Date    TSH 2.251 08/07/2023       Recent Imaging:  CV Ultrasound Bilateral Doppler Carotid  The right internal carotid artery demonstrated no hemodynamically   significant stenosis.  There is a mild amount of calcified plaque in the right carotid bulb.    The left internal carotid artery demonstrated no hemodynamically   significant stenosis.  There is a minimal amount of plaque in the left carotid bulb.    The right vertebral artery is patent with dampened antegrade flow.  The left vertebral artery is dominant and patent with antegrade flow.       Assessment & Plan:    Marnie Briggs is presenting as above and will be treated as follows:    1. Essential hypertension  - Well controlled, continue current regimen     2. Hypercholesterolemia  - Continue statin therapy and Zetia   - Lipid panel reviewed     3. Type 2 diabetes mellitus with stage 2 chronic kidney disease, with long-term current use of insulin  - Check A1C   - Continue current regimen, doing well   - Order sent for diagnostic strips to insurance   - Continue home CBG monitoring     4. Chronic right-sided low back pain with right-sided sciatica  - Still with significant pain   - Continue Tylenol   - Discontinue Robaxin   - Will trial her on low dose Tramadol 50mg PRN at bed time, she has " tolerated well in the past  - Discussed appropriate use and potential SE and risk w/ opioids  - Offered her HH for PT in the home which she declined   - Continue HEP, topical lidocaine   - Continue gabapentin   - Continue to monitor, does not desire any future surgical management     5. Fall   - No falls recently, gait has been steady   - Significant improvement since medication adjustments   - Cardiac work up reviewed  - Medications reviewed   - Keep evaluation with cardiology in January     RTC 3 months or sooner if needed     Kenzie Blackman MD  Attending - Family Medicine / Geriatric Medicine  [facility]

## 2023-12-21 ENCOUNTER — TELEPHONE (OUTPATIENT)
Dept: FAMILY MEDICINE | Facility: CLINIC | Age: 73
End: 2023-12-21
Payer: MEDICARE

## 2023-12-21 NOTE — TELEPHONE ENCOUNTER
The order for the diabetes supplies was faxed to [facility] with successful confirmation.    Brittney Rolon RN    [facility]

## 2024-01-24 DIAGNOSIS — E78.00 HYPERCHOLESTEROLEMIA: Primary | ICD-10-CM

## 2024-01-26 ENCOUNTER — LAB VISIT (OUTPATIENT)
Dept: LAB | Facility: HOSPITAL | Age: 74
End: 2024-01-26
Attending: INTERNAL MEDICINE
Payer: MEDICARE

## 2024-01-26 DIAGNOSIS — E11.22 TYPE 2 DIABETES MELLITUS WITH STAGE 2 CHRONIC KIDNEY DISEASE, WITH LONG-TERM CURRENT USE OF INSULIN: ICD-10-CM

## 2024-01-26 DIAGNOSIS — Z79.4 TYPE 2 DIABETES MELLITUS WITH STAGE 2 CHRONIC KIDNEY DISEASE, WITH LONG-TERM CURRENT USE OF INSULIN: ICD-10-CM

## 2024-01-26 DIAGNOSIS — E06.3 HASHIMOTO'S THYROIDITIS: Chronic | ICD-10-CM

## 2024-01-26 DIAGNOSIS — E66.09 CLASS 1 OBESITY DUE TO EXCESS CALORIES WITH SERIOUS COMORBIDITY AND BODY MASS INDEX (BMI) OF 32.0 TO 32.9 IN ADULT: Chronic | ICD-10-CM

## 2024-01-26 DIAGNOSIS — E78.00 HYPERCHOLESTEROLEMIA: Chronic | ICD-10-CM

## 2024-01-26 DIAGNOSIS — N18.2 TYPE 2 DIABETES MELLITUS WITH STAGE 2 CHRONIC KIDNEY DISEASE, WITH LONG-TERM CURRENT USE OF INSULIN: ICD-10-CM

## 2024-01-26 LAB
ALBUMIN SERPL-MCNC: 3.3 G/DL (ref 3.4–4.8)
ALBUMIN/GLOB SERPL: 1 RATIO (ref 1.1–2)
ALP SERPL-CCNC: 79 UNIT/L (ref 40–150)
ALT SERPL-CCNC: 18 UNIT/L (ref 0–55)
AST SERPL-CCNC: 18 UNIT/L (ref 5–34)
BASOPHILS # BLD AUTO: 0.03 X10(3)/MCL
BASOPHILS NFR BLD AUTO: 0.3 %
BILIRUB SERPL-MCNC: 0.3 MG/DL
BUN SERPL-MCNC: 16.5 MG/DL (ref 9.8–20.1)
CALCIUM SERPL-MCNC: 8.4 MG/DL (ref 8.4–10.2)
CHLORIDE SERPL-SCNC: 110 MMOL/L (ref 98–107)
CHOLEST SERPL-MCNC: 80 MG/DL
CHOLEST/HDLC SERPL: 2 {RATIO} (ref 0–5)
CO2 SERPL-SCNC: 27 MMOL/L (ref 23–31)
CREAT SERPL-MCNC: 1.02 MG/DL (ref 0.55–1.02)
CREAT UR-MCNC: 50.7 MG/DL (ref 45–106)
DEPRECATED CALCIDIOL+CALCIFEROL SERPL-MC: 56.9 NG/ML (ref 30–80)
EOSINOPHIL # BLD AUTO: 0.25 X10(3)/MCL (ref 0–0.9)
EOSINOPHIL NFR BLD AUTO: 2.6 %
ERYTHROCYTE [DISTWIDTH] IN BLOOD BY AUTOMATED COUNT: 13.2 % (ref 11.5–17)
EST. AVERAGE GLUCOSE BLD GHB EST-MCNC: 191.5 MG/DL
GFR SERPLBLD CREATININE-BSD FMLA CKD-EPI: 59 MLS/MIN/1.73/M2
GLOBULIN SER-MCNC: 3.2 GM/DL (ref 2.4–3.5)
GLUCOSE SERPL-MCNC: 126 MG/DL (ref 82–115)
HBA1C MFR BLD: 8.3 %
HCT VFR BLD AUTO: 36.7 % (ref 37–47)
HDLC SERPL-MCNC: 34 MG/DL (ref 35–60)
HGB BLD-MCNC: 11.6 G/DL (ref 12–16)
IMM GRANULOCYTES # BLD AUTO: 0.04 X10(3)/MCL (ref 0–0.04)
IMM GRANULOCYTES NFR BLD AUTO: 0.4 %
LDLC SERPL CALC-MCNC: 29 MG/DL (ref 50–140)
LYMPHOCYTES # BLD AUTO: 2.69 X10(3)/MCL (ref 0.6–4.6)
LYMPHOCYTES NFR BLD AUTO: 27.8 %
MCH RBC QN AUTO: 28.6 PG (ref 27–31)
MCHC RBC AUTO-ENTMCNC: 31.6 G/DL (ref 33–36)
MCV RBC AUTO: 90.4 FL (ref 80–94)
MICROALBUMIN UR-MCNC: <5 UG/ML
MICROALBUMIN/CREAT RATIO PNL UR: NORMAL
MONOCYTES # BLD AUTO: 0.44 X10(3)/MCL (ref 0.1–1.3)
MONOCYTES NFR BLD AUTO: 4.5 %
NEUTROPHILS # BLD AUTO: 6.23 X10(3)/MCL (ref 2.1–9.2)
NEUTROPHILS NFR BLD AUTO: 64.4 %
NRBC BLD AUTO-RTO: 0 %
PLATELET # BLD AUTO: 295 X10(3)/MCL (ref 130–400)
PMV BLD AUTO: 9.7 FL (ref 7.4–10.4)
POTASSIUM SERPL-SCNC: 4.1 MMOL/L (ref 3.5–5.1)
PROT SERPL-MCNC: 6.5 GM/DL (ref 5.8–7.6)
RBC # BLD AUTO: 4.06 X10(6)/MCL (ref 4.2–5.4)
SODIUM SERPL-SCNC: 144 MMOL/L (ref 136–145)
TRIGL SERPL-MCNC: 85 MG/DL (ref 37–140)
TSH SERPL-ACNC: 4.77 UIU/ML (ref 0.35–4.94)
VLDLC SERPL CALC-MCNC: 17 MG/DL
WBC # SPEC AUTO: 9.68 X10(3)/MCL (ref 4.5–11.5)

## 2024-01-26 PROCEDURE — 82043 UR ALBUMIN QUANTITATIVE: CPT

## 2024-01-26 PROCEDURE — 36415 COLL VENOUS BLD VENIPUNCTURE: CPT

## 2024-01-26 PROCEDURE — 83036 HEMOGLOBIN GLYCOSYLATED A1C: CPT

## 2024-01-26 PROCEDURE — 80053 COMPREHEN METABOLIC PANEL: CPT

## 2024-01-26 PROCEDURE — 84443 ASSAY THYROID STIM HORMONE: CPT

## 2024-01-26 PROCEDURE — 82306 VITAMIN D 25 HYDROXY: CPT

## 2024-01-26 PROCEDURE — 80061 LIPID PANEL: CPT

## 2024-01-26 PROCEDURE — 85025 COMPLETE CBC W/AUTO DIFF WBC: CPT

## 2024-01-29 NOTE — PROGRESS NOTES
CHIEF COMPLAINT:   Chief Complaint   Patient presents with    f/u denies chest pain or sob since LV no questions                                                   HPI:  Marnie Briggs 73 y.o. female  with a PMH significant for HTN, HLD, DM II, CKD 3a, Hashimoto's, Obesity who presents to cardiology clinic for routine follow-up and results of testing.  The patient was initially referred to Cardiology Clinic for an abnormal EKG.  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  She denied any associated lightheadedness, dizziness, chest pain, shortness of breath or palpitations.  Echocardiogram obtained on 7.24.23 revealed preserved EF of 59%, normal diastolic function and no significant valvular abnormalities.  Carotid ultrasound obtained in December 2023 demonstrated no hemodynamically significant stenosis to bilateral internal carotid arteries.  Thirty day event monitor obtained from 9.13.23-10.13.23 was unremarkable, revealing underlying sinus rhythm with no significant arrhythmias noted.    Patient presents to clinic today denying any cardiac complaints of chest pain, shortness of breath, palpitations, orthopnea, PND, peripheral edema, claudication, lightheadedness, dizziness, near-syncope or syncope.  Patient states she is able to perform her basic activities and some housework around her home without experiencing any ischemic symptoms.  She reportedly tries to remain active and goes out routinely to play bingo and socialize with others.  She endorses compliance with her current medications and is currently tolerating without issue.      LEVEL OF EXERTION:  The patient does house work and does not have symptoms with this level of exertion.  The patient's level of exertion is good.                                                                                                                                                                                                                                                                                                                                                                                                                                                                         CARDIAC TESTING:    ECHO 7.24.23    Interpretation Summary  · Concentric hypertrophy and normal systolic function.  · The estimated ejection fraction is 59%.  · Normal left ventricular diastolic function.  · Normal central venous pressure (3 mmHg).  · With normal right ventricular systolic function.    Carotid US 12.6.23  The right internal carotid artery demonstrated no hemodynamically significant stenosis.  There is a mild amount of calcified plaque in the right carotid bulb.     The left internal carotid artery demonstrated no hemodynamically significant stenosis.  There is a minimal amount of plaque in the left carotid bulb.     The right vertebral artery is patent with dampened antegrade flow.  The left vertebral artery is dominant and patent with antegrade flow.    30 Day Event Monitor 9.13.23-10.13.23  Physician's interpretation   1. Underlying rhythm is sinus   2. No symptoms reported   3. No patient activated events   4. No significant arrhythmias noted       Patient Active Problem List   Diagnosis    Type 2 diabetes mellitus with stage 2 chronic kidney disease, with long-term current use of insulin    Essential hypertension    Hypercholesterolemia    Stage 2 chronic kidney disease due to type 2 diabetes mellitus    Chronic right-sided low back pain with right-sided sciatica    Depression    Class 1 obesity due to excess calories with serious comorbidity and body mass index (BMI) of 32.0 to 32.9 in adult    Anxiety    Lichen sclerosus et atrophicus    Cataract    Bulging lumbar disc    Cervical stenosis of spinal canal    Epiretinal membrane (ERM) of left eye    Gastroesophageal reflux disease    Insomnia    Neck pain     Neuropathy    Nuclear senile cataract    Severe acute respiratory syndrome coronavirus 2 (SARS-CoV-2) vaccination not indicated    Sinusitis    Needs flu shot    Hashimoto's thyroiditis    Retinoschisis of left eye    Bilateral carotid artery stenosis     Past Surgical History:   Procedure Laterality Date    ANKLE FUSION Right     CATARACT EXTRACTION Bilateral     CHOLECYSTECTOMY      COLONOSCOPY  11/30/2015    HYSTERECTOMY      NECK SURGERY      stint      Right kidney     Social History     Socioeconomic History    Marital status:     Number of children: 4   Occupational History    Occupation: disabled   Tobacco Use    Smoking status: Never     Passive exposure: Never    Smokeless tobacco: Never   Substance and Sexual Activity    Alcohol use: Never    Drug use: Never    Sexual activity: Not Currently     Partners: Male     Social Determinants of Health     Financial Resource Strain: Low Risk  (7/12/2023)    Overall Financial Resource Strain (CARDIA)     Difficulty of Paying Living Expenses: Not hard at all   Food Insecurity: No Food Insecurity (7/12/2023)    Hunger Vital Sign     Worried About Running Out of Food in the Last Year: Never true     Ran Out of Food in the Last Year: Never true   Transportation Needs: No Transportation Needs (7/12/2023)    PRAPARE - Transportation     Lack of Transportation (Medical): No     Lack of Transportation (Non-Medical): No   Physical Activity: Inactive (7/12/2023)    Exercise Vital Sign     Days of Exercise per Week: 0 days     Minutes of Exercise per Session: 0 min   Stress: No Stress Concern Present (7/12/2023)    [employer] - Occupational Stress Questionnaire     Feeling of Stress : Not at all   Social Connections: Moderately Integrated (7/12/2023)    Social Connection and Isolation Panel [NHANES]     Frequency of Communication with Friends and Family: Three times a week     Frequency of Social Gatherings with Friends and Family: More  "than three times a week     Attends Restorationism Services: More than 4 times per year     Active Member of Clubs or Organizations: No     Attends Club or Organization Meetings: More than 4 times per year     Marital Status:    Housing Stability: Low Risk  (7/12/2023)    Housing Stability Vital Sign     Unable to Pay for Housing in the Last Year: No     Number of Places Lived in the Last Year: 1     Unstable Housing in the Last Year: No        Family History   Problem Relation Age of Onset    Diabetes Mother     Stroke Mother     Hypertension Father     Diabetes Father     Heart disease Father     Cancer Brother     Diabetes Brother      Review of patient's allergies indicates:  No Known Allergies      ROS:  Review of Systems   Constitutional: Negative.    HENT: Negative.     Eyes: Negative.    Respiratory: Negative.  Negative for shortness of breath.    Cardiovascular: Negative.    Gastrointestinal: Negative.    Genitourinary: Negative.    Musculoskeletal: Negative.    Skin: Negative.    Neurological: Negative.    Endo/Heme/Allergies: Negative.    Psychiatric/Behavioral: Negative.                                                                                                                                                                                  Negative except as stated in the history of present illness. See HPI for details.    PHYSICAL EXAM:  Visit Vitals  /76 (BP Location: Left arm, Patient Position: Sitting, BP Method: Medium (Automatic))   Pulse 74   Temp 99 °F (37.2 °C) (Oral)   Resp 20   Ht 5' 2" (1.575 m)   Wt 78.5 kg (173 lb 1 oz)   SpO2 98%   BMI 31.65 kg/m²       Physical Exam  HENT:      Head: Normocephalic.      Nose: Nose normal.   Eyes:      Pupils: Pupils are equal, round, and reactive to light.   Cardiovascular:      Rate and Rhythm: Normal rate and regular rhythm.   Pulmonary:      Effort: Pulmonary effort is normal.   Abdominal:      General: Abdomen is flat. Bowel sounds are " "normal.      Palpations: Abdomen is soft.   Musculoskeletal:         General: Normal range of motion.      Cervical back: Normal range of motion.   Skin:     General: Skin is warm.   Neurological:      General: No focal deficit present.      Mental Status: She is alert.   Psychiatric:         Mood and Affect: Mood normal.       Current Outpatient Medications   Medication Instructions    acetaminophen (TYLENOL) 500 mg, Oral, Every 6 hours PRN    aspirin 81 mg, Oral, Daily    atorvastatin (LIPITOR) 80 mg, Oral, Daily    blood sugar diagnostic Strp 1 strip, Misc.(Non-Drug; Combo Route), 2 times daily, To check BG 2 times daily (AM morning fasting, and 2 hours after dinner), to use with insurance preferred meter    carvediloL (COREG) 12.5 mg, Oral, 2 times daily    clobetasol 0.05% (TEMOVATE) 0.05 % Oint Apply nightly x1 month, then every other night x1 month, then 2x/week going forward    ezetimibe (ZETIA) 10 mg, Oral, Daily    gabapentin (NEURONTIN) 600 mg, Oral, 2 times daily    lancets (LANCETS,THIN) Misc 1 Needle, Misc.(Non-Drug; Combo Route), 2 times daily    LANTUS SOLOSTAR U-100 INSULIN 26 Units, Subcutaneous, Daily    lisinopriL 10 mg, Oral, Daily    loratadine (CLARITIN) 10 mg, Oral, Daily    meloxicam (MOBIC) 7.5 mg, Oral, Daily    metFORMIN (GLUCOPHAGE) 1,000 mg, Oral, 2 times daily with meals    multivitamin capsule 1 capsule, Oral, Daily    omeprazole (PRILOSEC) 40 mg, Oral, Daily    pen needle, diabetic 29 gauge x 1/2" Ndle   Insulin pen needles, See Instructions, Inuslin pen needles for once a day Lantus injection  E11.65, # 90 EA, 3 Refill(s), Pharmacy: [facility] [id number], 162, cm, Height/Length Dosing, 12/10/21 9:34:00 CST, 90.2, kg, Weight Dosing, 12/10/21 9:34:00 CST    vitamin D (VITAMIN D3) 1,000 Units, Oral, Daily        All medications, laboratory studies, cardiac diagnostic imaging reviewed.     Lab Results   Component Value Date    LDL 29.00 (L) 01/26/2024    LDL 45.00 (L) 08/07/2023    TRIG " 85 01/26/2024    TRIG 127 08/07/2023    CREATININE 1.02 01/26/2024    MG 1.30 (L) 08/07/2023    K 4.1 01/26/2024        ASSESSMENT/PLAN:  Syncope- Denies any further syncopal episodes  - Carotid US -no hemodynamically significant stenosis to bilateral internal carotid arteries (12.6.23)  - 30 day monitor -underlying sinus rhythm with no significant arrhythmias noted (9.13.23-10.13.23)     Abnormal ECG/ Incomplete RBBB  - noted on EKG- Patient Asymptomatic  - no indication for further evaluation at this time   - EF-59% per ECHO 7.24.23      HTN   - BP at goal today     - Continue Coreg 12.5 mg and Lisinopril 10 mg   - Continue Low Na Diet and Exercise as tolerated      HTN  - LDL -29- at goal, total chol-80   - Continue atorvastatin 80mg. Will Discontinue Zetia at this time. Discussed with staff cardiologist   - Repeat FLP prior to next appointment  - Continue Diet and Exercise as tolerated     DM II  - A1C-8.3  - Management per PCP      Discontinue Zetia   Follow up in cardiology Clinic in 5 months with FLP or sooner if needed  Follow up with PCP as directed

## 2024-01-30 ENCOUNTER — OFFICE VISIT (OUTPATIENT)
Dept: CARDIOLOGY | Facility: CLINIC | Age: 74
End: 2024-01-30
Payer: MEDICARE

## 2024-01-30 VITALS
SYSTOLIC BLOOD PRESSURE: 119 MMHG | OXYGEN SATURATION: 98 % | HEIGHT: 62 IN | HEART RATE: 74 BPM | BODY MASS INDEX: 31.85 KG/M2 | WEIGHT: 173.06 LBS | DIASTOLIC BLOOD PRESSURE: 76 MMHG | TEMPERATURE: 99 F | RESPIRATION RATE: 20 BRPM

## 2024-01-30 DIAGNOSIS — I10 ESSENTIAL HYPERTENSION: Chronic | ICD-10-CM

## 2024-01-30 DIAGNOSIS — E78.00 HYPERCHOLESTEROLEMIA: Primary | Chronic | ICD-10-CM

## 2024-01-30 PROCEDURE — 99215 OFFICE O/P EST HI 40 MIN: CPT | Mod: PBBFAC | Performed by: NURSE PRACTITIONER

## 2024-01-30 PROCEDURE — 99214 OFFICE O/P EST MOD 30 MIN: CPT | Mod: S$PBB,,, | Performed by: NURSE PRACTITIONER

## 2024-01-30 NOTE — PATIENT INSTRUCTIONS
Discontinue Zetia   Follow up in cardiology Clinic in 5 months with FLP or sooner if needed  Follow up with PCP as directed

## 2024-03-06 ENCOUNTER — OFFICE VISIT (OUTPATIENT)
Dept: FAMILY MEDICINE | Facility: CLINIC | Age: 74
End: 2024-03-06
Payer: MEDICARE

## 2024-03-06 VITALS
BODY MASS INDEX: 31.95 KG/M2 | TEMPERATURE: 98 F | HEART RATE: 68 BPM | SYSTOLIC BLOOD PRESSURE: 130 MMHG | HEIGHT: 62 IN | WEIGHT: 173.63 LBS | DIASTOLIC BLOOD PRESSURE: 79 MMHG

## 2024-03-06 DIAGNOSIS — M79.604 PAIN IN BOTH LOWER EXTREMITIES: ICD-10-CM

## 2024-03-06 DIAGNOSIS — M51.36 BULGING LUMBAR DISC: ICD-10-CM

## 2024-03-06 DIAGNOSIS — M79.605 PAIN IN BOTH LOWER EXTREMITIES: ICD-10-CM

## 2024-03-06 DIAGNOSIS — Z79.899 ENCOUNTER FOR MEDICATION REVIEW: Primary | ICD-10-CM

## 2024-03-06 DIAGNOSIS — M25.561 ACUTE BILATERAL KNEE PAIN: ICD-10-CM

## 2024-03-06 DIAGNOSIS — Z01.89 ENCOUNTER FOR GERIATRIC ASSESSMENT: ICD-10-CM

## 2024-03-06 DIAGNOSIS — M54.41 CHRONIC MIDLINE LOW BACK PAIN WITH RIGHT-SIDED SCIATICA: Chronic | ICD-10-CM

## 2024-03-06 DIAGNOSIS — M25.562 ACUTE BILATERAL KNEE PAIN: ICD-10-CM

## 2024-03-06 DIAGNOSIS — R53.81 PHYSICAL DECONDITIONING: ICD-10-CM

## 2024-03-06 DIAGNOSIS — M54.16 LUMBAR RADICULOPATHY, CHRONIC: ICD-10-CM

## 2024-03-06 DIAGNOSIS — M15.9 PRIMARY OSTEOARTHRITIS INVOLVING MULTIPLE JOINTS: ICD-10-CM

## 2024-03-06 DIAGNOSIS — G89.29 CHRONIC MIDLINE LOW BACK PAIN WITH RIGHT-SIDED SCIATICA: Chronic | ICD-10-CM

## 2024-03-06 DIAGNOSIS — M54.9 DORSALGIA, UNSPECIFIED: ICD-10-CM

## 2024-03-06 PROCEDURE — 99215 OFFICE O/P EST HI 40 MIN: CPT | Mod: PBBFAC | Performed by: FAMILY MEDICINE

## 2024-03-06 RX ORDER — LIDOCAINE 50 MG/G
1 PATCH TOPICAL DAILY
Qty: 30 PATCH | Refills: 0 | Status: SHIPPED | OUTPATIENT
Start: 2024-03-06 | End: 2024-04-05

## 2024-03-06 RX ORDER — MELOXICAM 15 MG/1
15 TABLET ORAL DAILY
Qty: 30 TABLET | Refills: 0 | Status: SHIPPED | OUTPATIENT
Start: 2024-03-06 | End: 2024-04-05

## 2024-03-06 RX ORDER — METHOCARBAMOL 500 MG/1
500 TABLET, FILM COATED ORAL 3 TIMES DAILY
Qty: 30 TABLET | Refills: 0 | Status: SHIPPED | OUTPATIENT
Start: 2024-03-06 | End: 2024-03-06

## 2024-03-06 RX ORDER — METHOCARBAMOL 500 MG/1
500 TABLET, FILM COATED ORAL 2 TIMES DAILY PRN
Qty: 60 TABLET | Refills: 0 | Status: SHIPPED | OUTPATIENT
Start: 2024-03-06 | End: 2024-04-19 | Stop reason: SDUPTHER
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
## 2024-03-06 NOTE — PATIENT INSTRUCTIONS
TYLENOL - 1000mg (2 extra strength tabs) three times a day only    Lidocaine patches to be applied only daytime and when not sleeping, clean after removal   at [facility] pharmacy    Increase meloxicam to 15 mg but take   1/2 pill twice a day  Sent to [facility] pharmacy    We will start a low dose muscle relaxer Robaxin/Methacarbamol to see if that also helps with pain.     We will repeat xrays of the back, as well as get ultrasounds of your legs to rule out circulation problems.     We will send referral to the physical therapist in [place].

## 2024-03-12 ENCOUNTER — HOSPITAL ENCOUNTER (OUTPATIENT)
Dept: RADIOLOGY | Facility: HOSPITAL | Age: 74
Discharge: HOME | End: 2024-03-12
Attending: FAMILY MEDICINE
Payer: MEDICARE

## 2024-03-12 DIAGNOSIS — M79.605 PAIN IN BOTH LOWER EXTREMITIES: ICD-10-CM

## 2024-03-12 DIAGNOSIS — M79.604 PAIN IN BOTH LOWER EXTREMITIES: ICD-10-CM

## 2024-03-12 DIAGNOSIS — R53.81 PHYSICAL DECONDITIONING: ICD-10-CM

## 2024-03-12 LAB
ABI POST MINUTES1: 5 MIN
IMMEDIATE ARM BP: 159 MMHG
IMMEDIATE LEFT ABI: 1.06
IMMEDIATE LEFT TIBIAL: 168 MMHG
IMMEDIATE RIGHT ABI: 1.03
IMMEDIATE RIGHT TIBIAL: 163 MMHG
LEFT ABI: 1.12
LEFT ARM BP: 147 MMHG
LEFT DORSALIS PEDIS: 174 MMHG
LEFT POSTERIOR TIBIAL: 171 MMHG
POST1 ARM BP: 160 MMHG
POST1 LEFT ABI: 1.13
POST1 LEFT TIBIAL: 180 MMHG
POST1 RIGHT ABI: 1.15
POST1 RIGHT TIBIAL: 184 MMHG
RIGHT ABI: 1.14
RIGHT ARM BP: 155 MMHG
RIGHT DORSALIS PEDIS: 177 MMHG
RIGHT POSTERIOR TIBIAL: 155 MMHG
TOE RAISES: 35

## 2024-03-12 PROCEDURE — 93924 LWR XTR VASC STDY BILAT: CPT

## 2024-04-17 ENCOUNTER — OFFICE VISIT (OUTPATIENT)
Dept: FAMILY MEDICINE | Facility: CLINIC | Age: 74
End: 2024-04-17
Payer: MEDICARE

## 2024-04-17 ENCOUNTER — HOSPITAL ENCOUNTER (OUTPATIENT)
Dept: RADIOLOGY | Facility: HOSPITAL | Age: 74
Discharge: HOME | End: 2024-04-17
Attending: STUDENT IN AN ORGANIZED HEALTH CARE EDUCATION/TRAINING PROGRAM
Payer: MEDICARE

## 2024-04-17 VITALS
DIASTOLIC BLOOD PRESSURE: 75 MMHG | HEART RATE: 70 BPM | OXYGEN SATURATION: 96 % | WEIGHT: 176 LBS | TEMPERATURE: 98 F | RESPIRATION RATE: 18 BRPM | BODY MASS INDEX: 32.39 KG/M2 | HEIGHT: 62 IN | SYSTOLIC BLOOD PRESSURE: 124 MMHG

## 2024-04-17 DIAGNOSIS — Z79.899 ENCOUNTER FOR MEDICATION REVIEW: Primary | ICD-10-CM

## 2024-04-17 DIAGNOSIS — M54.9 DORSALGIA, UNSPECIFIED: ICD-10-CM

## 2024-04-17 DIAGNOSIS — Z23 NEED FOR VACCINATION: ICD-10-CM

## 2024-04-17 DIAGNOSIS — Z01.89 ENCOUNTER FOR GERIATRIC ASSESSMENT: ICD-10-CM

## 2024-04-17 DIAGNOSIS — M79.604 LEG PAIN, BILATERAL: ICD-10-CM

## 2024-04-17 DIAGNOSIS — Z86.39 HISTORY OF DIABETES MELLITUS, TYPE II: ICD-10-CM

## 2024-04-17 DIAGNOSIS — Z79.4 TYPE 2 DIABETES MELLITUS WITHOUT COMPLICATION, WITH LONG-TERM CURRENT USE OF INSULIN: ICD-10-CM

## 2024-04-17 DIAGNOSIS — M79.605 LEG PAIN, BILATERAL: ICD-10-CM

## 2024-04-17 DIAGNOSIS — M54.16 LUMBAR RADICULOPATHY, CHRONIC: ICD-10-CM

## 2024-04-17 DIAGNOSIS — M48.061 FORAMINAL STENOSIS OF LUMBAR REGION: ICD-10-CM

## 2024-04-17 DIAGNOSIS — M51.36 BULGING LUMBAR DISC: ICD-10-CM

## 2024-04-17 DIAGNOSIS — E11.9 TYPE 2 DIABETES MELLITUS WITHOUT COMPLICATION, WITH LONG-TERM CURRENT USE OF INSULIN: ICD-10-CM

## 2024-04-17 DIAGNOSIS — R29.6 FREQUENT FALLS: ICD-10-CM

## 2024-04-17 LAB
EST. AVERAGE GLUCOSE BLD GHB EST-MCNC: 157.1 MG/DL
HBA1C MFR BLD: 7.1 %
HCV AB SERPL QL IA: NONREACTIVE
HIV 1+2 AB+HIV1 P24 AG SERPL QL IA: NONREACTIVE
T PALLIDUM AB SER QL: NONREACTIVE
VIT B12 SERPL-MCNC: 500 PG/ML (ref 213–816)

## 2024-04-17 PROCEDURE — 99215 OFFICE O/P EST HI 40 MIN: CPT | Mod: PBBFAC,25 | Performed by: FAMILY MEDICINE

## 2024-04-17 PROCEDURE — 87389 HIV-1 AG W/HIV-1&-2 AB AG IA: CPT | Performed by: FAMILY MEDICINE

## 2024-04-17 PROCEDURE — 72110 X-RAY EXAM L-2 SPINE 4/>VWS: CPT | Mod: TC

## 2024-04-17 PROCEDURE — 86780 TREPONEMA PALLIDUM: CPT | Performed by: FAMILY MEDICINE

## 2024-04-17 PROCEDURE — 83036 HEMOGLOBIN GLYCOSYLATED A1C: CPT | Performed by: FAMILY MEDICINE

## 2024-04-17 PROCEDURE — 86803 HEPATITIS C AB TEST: CPT | Performed by: FAMILY MEDICINE

## 2024-04-17 PROCEDURE — 36415 COLL VENOUS BLD VENIPUNCTURE: CPT | Performed by: FAMILY MEDICINE

## 2024-04-17 PROCEDURE — 82607 VITAMIN B-12: CPT | Performed by: FAMILY MEDICINE

## 2024-04-17 RX ORDER — EZETIMIBE 10 MG/1
10 TABLET ORAL DAILY
COMMUNITY
Start: 2024-03-23

## 2024-04-17 RX ORDER — MELOXICAM 7.5 MG/1
7.5 TABLET ORAL 2 TIMES DAILY
COMMUNITY
Start: 2024-03-29 | End: 2024-05-15

## 2024-04-17 RX ORDER — LIDOCAINE 50 MG/G
1 PATCH TOPICAL
COMMUNITY

## 2024-04-17 RX ORDER — GABAPENTIN 300 MG/1
300 CAPSULE ORAL NIGHTLY
Qty: 30 CAPSULE | Refills: 0 | Status: SHIPPED | OUTPATIENT
Start: 2024-04-17 | End: 2024-05-15 | Stop reason: SDUPTHER
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
## 2024-04-19 RX ORDER — METHOCARBAMOL 500 MG/1
500 TABLET, FILM COATED ORAL 2 TIMES DAILY PRN
Qty: 60 TABLET | Refills: 0 | Status: SHIPPED | OUTPATIENT
Start: 2024-04-19 | End: 2024-06-19

## 2024-04-30 ENCOUNTER — LAB VISIT (OUTPATIENT)
Dept: LAB | Facility: HOSPITAL | Age: 74
End: 2024-04-30
Attending: INTERNAL MEDICINE
Payer: MEDICARE

## 2024-04-30 DIAGNOSIS — E78.00 HYPERCHOLESTEROLEMIA: Chronic | ICD-10-CM

## 2024-04-30 DIAGNOSIS — E11.22 STAGE 2 CHRONIC KIDNEY DISEASE DUE TO TYPE 2 DIABETES MELLITUS: ICD-10-CM

## 2024-04-30 DIAGNOSIS — N18.2 STAGE 2 CHRONIC KIDNEY DISEASE DUE TO TYPE 2 DIABETES MELLITUS: ICD-10-CM

## 2024-04-30 LAB
ALBUMIN SERPL-MCNC: 3.8 G/DL (ref 3.4–4.8)
ALBUMIN/GLOB SERPL: 1.2 RATIO (ref 1.1–2)
ALP SERPL-CCNC: 74 UNIT/L (ref 40–150)
ALT SERPL-CCNC: 18 UNIT/L (ref 0–55)
APPEARANCE UR: ABNORMAL
AST SERPL-CCNC: 21 UNIT/L (ref 5–34)
BACTERIA #/AREA URNS AUTO: ABNORMAL /HPF
BASOPHILS # BLD AUTO: 0.02 X10(3)/MCL
BASOPHILS NFR BLD AUTO: 0.2 %
BILIRUB SERPL-MCNC: 0.3 MG/DL
BILIRUB UR QL STRIP.AUTO: NEGATIVE
BUN SERPL-MCNC: 21.8 MG/DL (ref 9.8–20.1)
CALCIUM SERPL-MCNC: 8.9 MG/DL (ref 8.4–10.2)
CHLORIDE SERPL-SCNC: 109 MMOL/L (ref 98–107)
CHOLEST SERPL-MCNC: 88 MG/DL
CHOLEST/HDLC SERPL: 2 {RATIO} (ref 0–5)
CO2 SERPL-SCNC: 26 MMOL/L (ref 23–31)
COLOR UR AUTO: YELLOW
CREAT SERPL-MCNC: 1.4 MG/DL (ref 0.55–1.02)
CREAT UR-MCNC: 294 MG/DL (ref 45–106)
EOSINOPHIL # BLD AUTO: 0.3 X10(3)/MCL (ref 0–0.9)
EOSINOPHIL NFR BLD AUTO: 3.2 %
ERYTHROCYTE [DISTWIDTH] IN BLOOD BY AUTOMATED COUNT: 14 % (ref 11.5–17)
GFR SERPLBLD CREATININE-BSD FMLA CKD-EPI: 40 MLS/MIN/1.73/M2
GLOBULIN SER-MCNC: 3.3 GM/DL (ref 2.4–3.5)
GLUCOSE SERPL-MCNC: 125 MG/DL (ref 82–115)
GLUCOSE UR QL STRIP.AUTO: NORMAL
HCT VFR BLD AUTO: 38.7 % (ref 37–47)
HDLC SERPL-MCNC: 38 MG/DL (ref 35–60)
HGB BLD-MCNC: 12.1 G/DL (ref 12–16)
HYALINE CASTS #/AREA URNS LPF: ABNORMAL /LPF
IMM GRANULOCYTES # BLD AUTO: 0.03 X10(3)/MCL (ref 0–0.04)
IMM GRANULOCYTES NFR BLD AUTO: 0.3 %
KETONES UR QL STRIP.AUTO: NEGATIVE
LDLC SERPL CALC-MCNC: 33 MG/DL (ref 50–140)
LEUKOCYTE ESTERASE UR QL STRIP.AUTO: 500
LYMPHOCYTES # BLD AUTO: 2.51 X10(3)/MCL (ref 0.6–4.6)
LYMPHOCYTES NFR BLD AUTO: 27 %
MAGNESIUM SERPL-MCNC: 1.5 MG/DL (ref 1.6–2.6)
MCH RBC QN AUTO: 27.8 PG (ref 27–31)
MCHC RBC AUTO-ENTMCNC: 31.3 G/DL (ref 33–36)
MCV RBC AUTO: 89 FL (ref 80–94)
MONOCYTES # BLD AUTO: 0.51 X10(3)/MCL (ref 0.1–1.3)
MONOCYTES NFR BLD AUTO: 5.5 %
MUCOUS THREADS URNS QL MICRO: ABNORMAL /LPF
NEUTROPHILS # BLD AUTO: 5.93 X10(3)/MCL (ref 2.1–9.2)
NEUTROPHILS NFR BLD AUTO: 63.8 %
NITRITE UR QL STRIP.AUTO: NEGATIVE
NON-SQ EPI CELLS URNS QL MICRO: ABNORMAL /HPF
NRBC BLD AUTO-RTO: 0 %
PH UR STRIP.AUTO: 6 [PH]
PHOSPHATE SERPL-MCNC: 3.2 MG/DL (ref 2.3–4.7)
PLATELET # BLD AUTO: 266 X10(3)/MCL (ref 130–400)
PMV BLD AUTO: 9.8 FL (ref 7.4–10.4)
POTASSIUM SERPL-SCNC: 4.8 MMOL/L (ref 3.5–5.1)
PROT SERPL-MCNC: 7.1 GM/DL (ref 5.8–7.6)
PROT UR QL STRIP.AUTO: ABNORMAL
PROT UR STRIP-MCNC: 23 MG/DL
RBC # BLD AUTO: 4.35 X10(6)/MCL (ref 4.2–5.4)
RBC #/AREA URNS AUTO: ABNORMAL /HPF
RBC UR QL AUTO: ABNORMAL
SODIUM SERPL-SCNC: 143 MMOL/L (ref 136–145)
SP GR UR STRIP.AUTO: 1.03 (ref 1–1.03)
SQUAMOUS #/AREA URNS LPF: ABNORMAL /HPF
TRIGL SERPL-MCNC: 83 MG/DL (ref 37–140)
URINE PROTEIN/CREATININE RATIO (OLG): 0.1
UROBILINOGEN UR STRIP-ACNC: ABNORMAL
VLDLC SERPL CALC-MCNC: 17 MG/DL
WBC # SPEC AUTO: 9.3 X10(3)/MCL (ref 4.5–11.5)
WBC #/AREA URNS AUTO: >100 /HPF

## 2024-04-30 PROCEDURE — 36415 COLL VENOUS BLD VENIPUNCTURE: CPT

## 2024-04-30 PROCEDURE — 85025 COMPLETE CBC W/AUTO DIFF WBC: CPT

## 2024-04-30 PROCEDURE — 83735 ASSAY OF MAGNESIUM: CPT

## 2024-04-30 PROCEDURE — 80061 LIPID PANEL: CPT

## 2024-04-30 PROCEDURE — 84100 ASSAY OF PHOSPHORUS: CPT

## 2024-04-30 PROCEDURE — 80053 COMPREHEN METABOLIC PANEL: CPT

## 2024-04-30 PROCEDURE — 87086 URINE CULTURE/COLONY COUNT: CPT

## 2024-04-30 PROCEDURE — 81001 URINALYSIS AUTO W/SCOPE: CPT

## 2024-04-30 PROCEDURE — 82570 ASSAY OF URINE CREATININE: CPT

## 2024-05-02 LAB — BACTERIA UR CULT: ABNORMAL

## 2024-05-02 RX ORDER — CEFUROXIME AXETIL 500 MG/1
500 TABLET ORAL 2 TIMES DAILY
Qty: 8 TABLET | Refills: 0 | Status: SHIPPED | OUTPATIENT
Start: 2024-05-02 | End: 2024-05-06

## 2024-05-02 NOTE — PROGRESS NOTES
"[facility] NEPHROLOGY  OUTPATIENT OFFICE VISIT NOTE    SUBJECTIVE:      HPI: Ms. Briggs is a 71-year-old  female with past medical history of diabetes mellitus type 2 (diagnosed in her 20s), chronic kidney disease, hypertension, dyslipidemia, and obesity.  Presents for follow-up appointment in Nephrology Clinic.     Patient states that she is doing well overall with no acute complaints today.  She reports that she still continue to have lower back/bilateral leg pain.  She states that she has been taking meloxicam on top of other medications for pain control.  Patient reports no urinary symptoms such as dysuria, frequency, hesitancy, hematuria, or nocturia. Denies suprapubic tenderness    ROS:  Constitutional: no fever, fatigue, weakness  Respiratory: no cough, no wheezing, no shortness of breath  Cardiovascular: no chest pain, no palpitations, no edema  Gastrointestinal: no nausea, vomiting, or diarrhea. No abdominal pain  Genitourinary: no dysuria, no urinary frequency or urgency, no hematuria  Musculoskeletal: +chronic lower back/leg pain  Integumentary: no skin rash or abnormal lesion  Neurologic: no headache, no dizziness, no weakness or numbness    OBJECTIVE:     Vital signs:   /85 (BP Location: Left arm, Patient Position: Sitting, BP Method: Medium (Automatic))   Pulse 72   Temp 98 °F (36.7 °C) (Oral)   Resp 18   Ht 5' 1.81" (1.57 m)   Wt 78.4 kg (172 lb 13.5 oz)   SpO2 99%   BMI 31.81 kg/m²      Physical Exam  Constitutional:       General: She is not in acute distress.  Cardiovascular:      Rate and Rhythm: Normal rate.      Pulses: Normal pulses.      Heart sounds: No murmur heard.     No friction rub. No gallop.   Pulmonary:      Breath sounds: No wheezing, rhonchi or rales.   Abdominal:      General: There is no distension.      Palpations: Abdomen is soft.      Tenderness: There is no abdominal tenderness. There is no right CVA tenderness or left CVA tenderness.      Comments: No " suprapubic tenderness   Skin:     General: Skin is warm and dry.   Neurological:      General: No focal deficit present.      Mental Status: She is alert. Mental status is at baseline.       ASSESSMENT & PLAN:     CKD Stage IIIb  - BUN/Cr displayed interval decline since last appointment with eGFR now at 40  - Urine protein/creatinine ratio WNL  - Vit D 56.9, intact PTH 64  - recommend cessation of meloxicam due to acute decrease in renal function since last appointment  - follow up in 5 months with labs prior  - Continue:    -follow 2 g a day dietary sodium restriction    -controlled diabetes (goal A1c less than 7%)    -control high blood pressure (goal blood pressure is less than 130/80, please check blood pressure twice a week and bring blood pressure logs to office visit)    -exercise at least 30 minutes a day, 5 days a week    -maintain healthy weight    -decrease or stop alcohol use    -do not smoke    -stay well hydrated (drink water only, avoid juices, sweet tea, and sodas)    -ask about staying up-to-date on vaccinations (flu vaccine, pneumonia vaccine, hepatitis B vaccine)    -avoid excessive use of NSAIDs (ibuprofen, naproxen, Aleve, Advil, Toradol, Mobic), take Tylenol as needed for headache or mild pain    -take cholesterol lowering medications if prescribed (LDL goal less than 100)    Diabetes Mellitus type II  - A1c 7.1% on 4/17/24. Well controlled for age  - Continue on diabetic regimen per PCP   - Educated on healthy diet and exercise     HTN  - /85 today in clinic  - Continue on Lisinopril and coreg  - Educated on DASH diet and exercise     HLD  - Continue to monitor, LDL at goal <70  - Last LDL 33    Obesity  - Encouraged healthy low fat diet and exercise   - Continue to monitor     Asymptomatic bacteriuria  - UA on 04/30 indicative of UTI.  However, patient reports that she is currently asymptomatic and denies all symptoms related to UTI.  - on no current antibiotic therapy  - urine culture  positive for 50 K to 70 K colonies of E coli pansensitive    Lewis Chacon DO  Internal Medicine - PGY-1

## 2024-05-03 ENCOUNTER — OFFICE VISIT (OUTPATIENT)
Dept: NEPHROLOGY | Facility: CLINIC | Age: 74
End: 2024-05-03
Payer: MEDICARE

## 2024-05-03 VITALS
WEIGHT: 172.81 LBS | HEIGHT: 62 IN | OXYGEN SATURATION: 99 % | HEART RATE: 72 BPM | DIASTOLIC BLOOD PRESSURE: 85 MMHG | TEMPERATURE: 98 F | SYSTOLIC BLOOD PRESSURE: 124 MMHG | BODY MASS INDEX: 31.8 KG/M2 | RESPIRATION RATE: 18 BRPM

## 2024-05-03 DIAGNOSIS — E11.22 STAGE 2 CHRONIC KIDNEY DISEASE DUE TO TYPE 2 DIABETES MELLITUS: Primary | Chronic | ICD-10-CM

## 2024-05-03 DIAGNOSIS — N18.2 STAGE 2 CHRONIC KIDNEY DISEASE DUE TO TYPE 2 DIABETES MELLITUS: Primary | Chronic | ICD-10-CM

## 2024-05-03 PROCEDURE — 99215 OFFICE O/P EST HI 40 MIN: CPT | Mod: PBBFAC | Performed by: INTERNAL MEDICINE

## 2024-05-03 RX ORDER — MIRTAZAPINE 15 MG/1
15 TABLET, FILM COATED ORAL NIGHTLY
COMMUNITY
Start: 2024-02-01

## 2024-05-15 ENCOUNTER — OFFICE VISIT (OUTPATIENT)
Dept: FAMILY MEDICINE | Facility: CLINIC | Age: 74
End: 2024-05-15
Payer: MEDICARE

## 2024-05-15 VITALS
TEMPERATURE: 98 F | SYSTOLIC BLOOD PRESSURE: 121 MMHG | BODY MASS INDEX: 32.01 KG/M2 | WEIGHT: 173.94 LBS | HEIGHT: 62 IN | OXYGEN SATURATION: 99 % | HEART RATE: 74 BPM | DIASTOLIC BLOOD PRESSURE: 75 MMHG | RESPIRATION RATE: 20 BRPM

## 2024-05-15 DIAGNOSIS — M54.41 CHRONIC RIGHT-SIDED LOW BACK PAIN WITH RIGHT-SIDED SCIATICA: Chronic | ICD-10-CM

## 2024-05-15 DIAGNOSIS — M51.36 BULGING LUMBAR DISC: ICD-10-CM

## 2024-05-15 DIAGNOSIS — G89.29 CHRONIC CERVICAL PAIN: ICD-10-CM

## 2024-05-15 DIAGNOSIS — M79.605 LEG PAIN, BILATERAL: ICD-10-CM

## 2024-05-15 DIAGNOSIS — M79.604 LEG PAIN, BILATERAL: ICD-10-CM

## 2024-05-15 DIAGNOSIS — Z01.89 ENCOUNTER FOR GERIATRIC ASSESSMENT: ICD-10-CM

## 2024-05-15 DIAGNOSIS — Z09 HOSPITAL DISCHARGE FOLLOW-UP: Primary | ICD-10-CM

## 2024-05-15 DIAGNOSIS — G89.29 CHRONIC RIGHT-SIDED LOW BACK PAIN WITH RIGHT-SIDED SCIATICA: Chronic | ICD-10-CM

## 2024-05-15 DIAGNOSIS — E78.00 HYPERCHOLESTEROLEMIA: Chronic | ICD-10-CM

## 2024-05-15 DIAGNOSIS — Z98.1 HISTORY OF FUSION OF CERVICAL SPINE: ICD-10-CM

## 2024-05-15 DIAGNOSIS — R29.6 FREQUENT FALLS: ICD-10-CM

## 2024-05-15 DIAGNOSIS — I65.23 BILATERAL CAROTID ARTERY STENOSIS: ICD-10-CM

## 2024-05-15 DIAGNOSIS — M54.9 DORSALGIA, UNSPECIFIED: ICD-10-CM

## 2024-05-15 DIAGNOSIS — M48.061 FORAMINAL STENOSIS OF LUMBAR REGION: ICD-10-CM

## 2024-05-15 DIAGNOSIS — Z79.899 ENCOUNTER FOR MEDICATION REVIEW: ICD-10-CM

## 2024-05-15 DIAGNOSIS — M54.16 LUMBAR RADICULOPATHY, CHRONIC: ICD-10-CM

## 2024-05-15 DIAGNOSIS — M54.2 CHRONIC CERVICAL PAIN: ICD-10-CM

## 2024-05-15 PROCEDURE — 99215 OFFICE O/P EST HI 40 MIN: CPT | Mod: PBBFAC | Performed by: FAMILY MEDICINE

## 2024-05-15 RX ORDER — GABAPENTIN 300 MG/1
300 CAPSULE ORAL NIGHTLY
Start: 2024-05-15 | End: 2024-05-21 | Stop reason: SDUPTHER

## 2024-05-15 RX ORDER — HYDROCODONE BITARTRATE AND ACETAMINOPHEN 5; 325 MG/1; MG/1
1 TABLET ORAL 2 TIMES DAILY PRN
Qty: 60 TABLET | Refills: 0 | Status: SHIPPED | OUTPATIENT
Start: 2024-05-15 | End: 2024-06-19 | Stop reason: SDUPTHER
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
## 2024-05-15 NOTE — PATIENT INSTRUCTIONS
Red flags, go to the ER:   Genital numbness  Incontinence  Leg weakness     gabapentin changed today to 300am and 900pm  Cont acetaminophen 1000 TID sched  Avoid NSAIDS. Toradol injection  stopped methacarbamol/Robaxin 500mg BID; may take the urgent care MD prescription at 750mg pm only      Cont Rx lidocaine 5% patches only daytime/nonsleeping use and clean area after removal   Alternate lidocaine with Voltaren    Cont home exercside program   use back brace   use walker   Cont  PT with Sourav Greene  Requested records from [facility]    MRI Lspine ordered today   Referral neurosurg placed   Pain management referral placed with [facility]

## 2024-05-17 DIAGNOSIS — R29.6 FREQUENT FALLS: ICD-10-CM

## 2024-05-17 DIAGNOSIS — M54.9 DORSALGIA, UNSPECIFIED: ICD-10-CM

## 2024-05-17 DIAGNOSIS — M48.061 FORAMINAL STENOSIS OF LUMBAR REGION: ICD-10-CM

## 2024-05-20 RX ORDER — GABAPENTIN 300 MG/1
300 CAPSULE ORAL NIGHTLY
Start: 2024-05-20 | End: 2024-06-19

## 2024-05-20 NOTE — PROGRESS NOTES
Date of Service: 3/6/2024    Attending Attestation: Patient discussed with geriatric fellow Dr. DELFINO Buckner . The chart was reviewed thoroughly including pertinent vitals, labs, imaging, medications, prior notes, and consultant/specialist recommendations.  I participated in the management of the patient, examined the patient, reviewed the summary of the plan, and was immediately available at all times throughout the encounter. Services were furnished in a primary care center located in the outpatient department of a teaching hospital. I agree with the fellow's findings and plan as documented in their note.    Kenzie Blackman MD  Attending - Family Medicine / Geriatric Medicine  [facility] [place], [facility]

## 2024-05-21 DIAGNOSIS — R29.6 FREQUENT FALLS: ICD-10-CM

## 2024-05-21 DIAGNOSIS — M48.061 FORAMINAL STENOSIS OF LUMBAR REGION: ICD-10-CM

## 2024-05-21 DIAGNOSIS — M54.9 DORSALGIA, UNSPECIFIED: ICD-10-CM

## 2024-05-21 NOTE — PROGRESS NOTES
Date of Service: 4/17/2024    Attending Attestation: Patient discussed with geriatric fellow Dr. DELFINO Buckner . The chart was reviewed thoroughly including pertinent vitals, labs, imaging, medications, prior notes, and consultant/specialist recommendations.  I participated in the management of the patient, examined the patient, reviewed the summary of the plan, and was immediately available at all times throughout the encounter. Services were furnished in a primary care center located in the outpatient department of a teaching hospital. I agree with the fellow's findings and plan as documented in their note.    Kenzie Blackman MD  Attending - Family Medicine / Geriatric Medicine  [facility] [place], [facility]

## 2024-05-22 RX ORDER — GABAPENTIN 300 MG/1
300 CAPSULE ORAL NIGHTLY
Qty: 30 CAPSULE | Refills: 2 | Status: SHIPPED | OUTPATIENT
Start: 2024-05-22 | End: 2024-06-19

## 2024-06-05 ENCOUNTER — HOSPITAL ENCOUNTER (OUTPATIENT)
Dept: RADIOLOGY | Facility: HOSPITAL | Age: 74
Discharge: HOME | End: 2024-06-05
Attending: STUDENT IN AN ORGANIZED HEALTH CARE EDUCATION/TRAINING PROGRAM
Payer: MEDICARE

## 2024-06-05 DIAGNOSIS — M48.061 FORAMINAL STENOSIS OF LUMBAR REGION: ICD-10-CM

## 2024-06-05 DIAGNOSIS — M54.9 DORSALGIA, UNSPECIFIED: ICD-10-CM

## 2024-06-05 DIAGNOSIS — R29.6 FREQUENT FALLS: ICD-10-CM

## 2024-06-05 PROCEDURE — 72148 MRI LUMBAR SPINE W/O DYE: CPT | Mod: TC

## 2024-06-06 DIAGNOSIS — I10 PRIMARY HYPERTENSION: Chronic | ICD-10-CM

## 2024-06-06 RX ORDER — LISINOPRIL 10 MG/1
10 TABLET ORAL DAILY
Qty: 90 TABLET | Refills: 0 | Status: SHIPPED | OUTPATIENT
Start: 2024-06-06 | End: 2024-09-04

## 2024-06-06 NOTE — PROGRESS NOTES
Spoke with pt in regards to MRI results    She has a pain management visit scheduled in July 2024 and has addresss, time to attend     We referred to neurosurg at last visit as well, although she has not received a call yet  She will FU with [facility] later this month, will verify appt at that time     Msg sent to support staff requesting FU with neurosurg office about referral    DOUGLAS Buckner MD HO-VIII  [facility] Family Medicine Geriatric Fellow

## 2024-06-19 ENCOUNTER — OFFICE VISIT (OUTPATIENT)
Dept: FAMILY MEDICINE | Facility: CLINIC | Age: 74
End: 2024-06-19
Payer: MEDICARE

## 2024-06-19 VITALS
BODY MASS INDEX: 31.88 KG/M2 | WEIGHT: 168.88 LBS | TEMPERATURE: 98 F | RESPIRATION RATE: 18 BRPM | HEART RATE: 69 BPM | DIASTOLIC BLOOD PRESSURE: 81 MMHG | SYSTOLIC BLOOD PRESSURE: 119 MMHG | OXYGEN SATURATION: 98 % | HEIGHT: 61 IN

## 2024-06-19 DIAGNOSIS — M51.36 BULGING LUMBAR DISC: ICD-10-CM

## 2024-06-19 DIAGNOSIS — M48.061 LUMBAR FORAMINAL STENOSIS: ICD-10-CM

## 2024-06-19 DIAGNOSIS — Z79.899 ENCOUNTER FOR MEDICATION REVIEW: Primary | ICD-10-CM

## 2024-06-19 DIAGNOSIS — E11.22 STAGE 2 CHRONIC KIDNEY DISEASE DUE TO TYPE 2 DIABETES MELLITUS: ICD-10-CM

## 2024-06-19 DIAGNOSIS — Z01.89 ENCOUNTER FOR GERIATRIC ASSESSMENT: ICD-10-CM

## 2024-06-19 DIAGNOSIS — Z98.1 HISTORY OF FUSION OF CERVICAL SPINE: ICD-10-CM

## 2024-06-19 DIAGNOSIS — M79.605 LOWER EXTREMITY PAIN, BILATERAL: ICD-10-CM

## 2024-06-19 DIAGNOSIS — M79.604 LOWER EXTREMITY PAIN, BILATERAL: ICD-10-CM

## 2024-06-19 DIAGNOSIS — M54.9 DORSALGIA, UNSPECIFIED: ICD-10-CM

## 2024-06-19 DIAGNOSIS — M54.16 LUMBAR RADICULOPATHY, CHRONIC: ICD-10-CM

## 2024-06-19 DIAGNOSIS — M48.061 FORAMINAL STENOSIS OF LUMBAR REGION: ICD-10-CM

## 2024-06-19 DIAGNOSIS — N18.2 STAGE 2 CHRONIC KIDNEY DISEASE DUE TO TYPE 2 DIABETES MELLITUS: ICD-10-CM

## 2024-06-19 DIAGNOSIS — R29.6 FREQUENT FALLS: ICD-10-CM

## 2024-06-19 PROCEDURE — 99215 OFFICE O/P EST HI 40 MIN: CPT | Mod: PBBFAC | Performed by: FAMILY MEDICINE

## 2024-06-19 RX ORDER — GABAPENTIN 300 MG/1
300 CAPSULE ORAL SEE ADMIN INSTRUCTIONS
Start: 2024-06-19 | End: 2024-09-17

## 2024-06-19 RX ORDER — HYDROCODONE BITARTRATE AND ACETAMINOPHEN 5; 325 MG/1; MG/1
1 TABLET ORAL 2 TIMES DAILY PRN
Qty: 60 TABLET | Refills: 0 | Status: SHIPPED | OUTPATIENT
Start: 2024-06-19 | End: 2024-07-19
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
## 2024-06-19 NOTE — PATIENT INSTRUCTIONS
Red flags, go to the ER:   Genital numbness  Incontinence  Leg weakness      gabapentin 300am and 900pm  Cont acetaminophen 1000 three times a day    Avoid NSAIDS. Toradol injection  stopped methacarbamol/Robaxin      Cont Rx lidocaine 5% patches only daytime/nonsleeping use and clean area after removal   Alternate lidocaine with Voltaren     Cont home exercise program   use back brace   use walker   Cont home exercise program     Referral neurosurg placed   Pain management referral placed with [facility]

## 2024-07-01 ENCOUNTER — OFFICE VISIT (OUTPATIENT)
Dept: CARDIOLOGY | Facility: CLINIC | Age: 74
End: 2024-07-01
Payer: MEDICARE

## 2024-07-01 VITALS
BODY MASS INDEX: 30.34 KG/M2 | OXYGEN SATURATION: 100 % | HEIGHT: 61 IN | RESPIRATION RATE: 20 BRPM | TEMPERATURE: 98 F | HEART RATE: 72 BPM | SYSTOLIC BLOOD PRESSURE: 132 MMHG | WEIGHT: 160.69 LBS | DIASTOLIC BLOOD PRESSURE: 78 MMHG

## 2024-07-01 DIAGNOSIS — I10 PRIMARY HYPERTENSION: Chronic | ICD-10-CM

## 2024-07-01 DIAGNOSIS — N18.2 STAGE 2 CHRONIC KIDNEY DISEASE DUE TO TYPE 2 DIABETES MELLITUS: Chronic | ICD-10-CM

## 2024-07-01 DIAGNOSIS — I10 ESSENTIAL HYPERTENSION: Primary | Chronic | ICD-10-CM

## 2024-07-01 DIAGNOSIS — E78.00 HYPERCHOLESTEROLEMIA: Chronic | ICD-10-CM

## 2024-07-01 DIAGNOSIS — E11.22 STAGE 2 CHRONIC KIDNEY DISEASE DUE TO TYPE 2 DIABETES MELLITUS: Chronic | ICD-10-CM

## 2024-07-01 PROBLEM — I65.23 BILATERAL CAROTID ARTERY STENOSIS: Status: RESOLVED | Noted: 2023-09-07 | Resolved: 2024-07-01

## 2024-07-01 LAB
OHS QRS DURATION: 100 MS
OHS QTC CALCULATION: 470 MS

## 2024-07-01 PROCEDURE — 99214 OFFICE O/P EST MOD 30 MIN: CPT | Mod: S$PBB,,, | Performed by: NURSE PRACTITIONER

## 2024-07-01 PROCEDURE — 4010F ACE/ARB THERAPY RXD/TAKEN: CPT | Mod: CPTII,,, | Performed by: NURSE PRACTITIONER

## 2024-07-01 PROCEDURE — 3066F NEPHROPATHY DOC TX: CPT | Mod: CPTII,,, | Performed by: NURSE PRACTITIONER

## 2024-07-01 PROCEDURE — 3008F BODY MASS INDEX DOCD: CPT | Mod: CPTII,,, | Performed by: NURSE PRACTITIONER

## 2024-07-01 PROCEDURE — 3072F LOW RISK FOR RETINOPATHY: CPT | Mod: CPTII,,, | Performed by: NURSE PRACTITIONER

## 2024-07-01 PROCEDURE — 3061F NEG MICROALBUMINURIA REV: CPT | Mod: CPTII,,, | Performed by: NURSE PRACTITIONER

## 2024-07-01 PROCEDURE — 1160F RVW MEDS BY RX/DR IN RCRD: CPT | Mod: CPTII,,, | Performed by: NURSE PRACTITIONER

## 2024-07-01 PROCEDURE — 93005 ELECTROCARDIOGRAM TRACING: CPT

## 2024-07-01 PROCEDURE — 3288F FALL RISK ASSESSMENT DOCD: CPT | Mod: CPTII,,, | Performed by: NURSE PRACTITIONER

## 2024-07-01 PROCEDURE — 3075F SYST BP GE 130 - 139MM HG: CPT | Mod: CPTII,,, | Performed by: NURSE PRACTITIONER

## 2024-07-01 PROCEDURE — 3051F HG A1C>EQUAL 7.0%<8.0%: CPT | Mod: CPTII,,, | Performed by: NURSE PRACTITIONER

## 2024-07-01 PROCEDURE — 99215 OFFICE O/P EST HI 40 MIN: CPT | Mod: PBBFAC,25 | Performed by: NURSE PRACTITIONER

## 2024-07-01 PROCEDURE — 3078F DIAST BP <80 MM HG: CPT | Mod: CPTII,,, | Performed by: NURSE PRACTITIONER

## 2024-07-01 PROCEDURE — 1159F MED LIST DOCD IN RCRD: CPT | Mod: CPTII,,, | Performed by: NURSE PRACTITIONER

## 2024-07-01 PROCEDURE — 1101F PT FALLS ASSESS-DOCD LE1/YR: CPT | Mod: CPTII,,, | Performed by: NURSE PRACTITIONER

## 2024-07-01 RX ORDER — LISINOPRIL 10 MG/1
10 TABLET ORAL DAILY
Qty: 90 TABLET | Refills: 3 | Status: SHIPPED | OUTPATIENT
Start: 2024-07-01 | End: 2025-07-01

## 2024-07-01 RX ORDER — CARVEDILOL 12.5 MG/1
12.5 TABLET ORAL 2 TIMES DAILY
Qty: 180 TABLET | Refills: 3 | Status: SHIPPED | OUTPATIENT
Start: 2024-07-01 | End: 2025-07-01

## 2024-07-01 RX ORDER — ATORVASTATIN CALCIUM 80 MG/1
80 TABLET, FILM COATED ORAL DAILY
Qty: 90 TABLET | Refills: 3 | Status: SHIPPED | OUTPATIENT
Start: 2024-07-01 | End: 2025-07-01

## 2024-07-01 NOTE — PROGRESS NOTES
CHIEF COMPLAINT:   Chief Complaint   Patient presents with    Follow-up     Denies any cardiac problems                                                  HPI:  Marnie Briggs 73 y.o. female  with a PMH significant for HTN, HLD, DM II, CKD, Hashimoto's, Obesity who presents to cardiology clinic for routine follow-up and ongoing care.  Latest Echocardiogram obtained on 7.24.23 revealed a preserved EF of 59%, normal diastolic function and no significant valvular abnormalities.  Thirty day event monitor obtained from 9.13.23-10.13.23 was unremarkable, revealing underlying sinus rhythm with no significant arrhythmias noted.    Patient presents to clinic today reporting that she feels good.  She denies any cardiovascular complaints of chest pain, shortness of breath, palpitations, orthopnea, PND, peripheral edema, claudication, lightheadedness, dizziness, near-syncope or syncope.  The patient is able to perform her routine housework and activities without experiencing any ischemic symptoms.  However, the patient does have some activity limitation secondary to chronic back pain.  She remains compliant with her current medications and is currently tolerating without any issues.    LEVEL OF EXERTION:  The patient does house work and does not have symptoms with this level of exertion.                                                                                                                                                                                                                                                                                                   CARDIAC TESTING:  NICHOLE of BLE 3.12.24  There were multiphasic waveforms at the ankles bilaterally.  The resting ABIs were normal.  The post stress ABIs were normal.     There was no significant arterial insufficiency identified on this study.         ECHO 7.24.23    Interpretation Summary  · Concentric hypertrophy and normal systolic function.  · The estimated  ejection fraction is 59%.  · Normal left ventricular diastolic function.  · Normal central venous pressure (3 mmHg).  · With normal right ventricular systolic function.    Carotid US 12.6.23  The right internal carotid artery demonstrated no hemodynamically significant stenosis.  There is a mild amount of calcified plaque in the right carotid bulb.     The left internal carotid artery demonstrated no hemodynamically significant stenosis.  There is a minimal amount of plaque in the left carotid bulb.     The right vertebral artery is patent with dampened antegrade flow.  The left vertebral artery is dominant and patent with antegrade flow.    30 Day Event Monitor 9.13.23-10.13.23  Physician's interpretation   1. Underlying rhythm is sinus   2. No symptoms reported   3. No patient activated events   4. No significant arrhythmias noted       Patient Active Problem List   Diagnosis    Type 2 diabetes mellitus with stage 2 chronic kidney disease, with long-term current use of insulin    Essential hypertension    Hypercholesterolemia    Stage 2 chronic kidney disease due to type 2 diabetes mellitus    Chronic right-sided low back pain with right-sided sciatica    Depression    Class 1 obesity due to excess calories with serious comorbidity and body mass index (BMI) of 32.0 to 32.9 in adult    Anxiety    Lichen sclerosus et atrophicus    Cataract    Bulging lumbar disc    Cervical stenosis of spinal canal    Epiretinal membrane (ERM) of left eye    Gastroesophageal reflux disease    Insomnia    Neck pain    Neuropathy    Nuclear senile cataract    Severe acute respiratory syndrome coronavirus 2 (SARS-CoV-2) vaccination not indicated    Sinusitis    Needs flu shot    Hashimoto's thyroiditis    Retinoschisis of left eye     Past Surgical History:   Procedure Laterality Date    ANKLE FUSION Right     CATARACT EXTRACTION Bilateral     CHOLECYSTECTOMY      COLONOSCOPY  11/30/2015    HYSTERECTOMY      NECK SURGERY      stint       Right kidney     Social History     Socioeconomic History    Marital status:     Number of children: 4   Occupational History    Occupation: disabled   Tobacco Use    Smoking status: Never     Passive exposure: Never    Smokeless tobacco: Never   Substance and Sexual Activity    Alcohol use: Never    Drug use: Never    Sexual activity: Not Currently     Partners: Male     Social Determinants of Health     Financial Resource Strain: Low Risk  (7/12/2023)    Overall Financial Resource Strain (CARDIA)     Difficulty of Paying Living Expenses: Not hard at all   Food Insecurity: No Food Insecurity (7/12/2023)    Hunger Vital Sign     Worried About Running Out of Food in the Last Year: Never true     Ran Out of Food in the Last Year: Never true   Transportation Needs: No Transportation Needs (7/12/2023)    PRAPARE - Transportation     Lack of Transportation (Medical): No     Lack of Transportation (Non-Medical): No   Physical Activity: Inactive (7/12/2023)    Exercise Vital Sign     Days of Exercise per Week: 0 days     Minutes of Exercise per Session: 0 min   Stress: No Stress Concern Present (7/12/2023)    [employer] - Occupational Stress Questionnaire     Feeling of Stress : Not at all   Housing Stability: Low Risk  (7/12/2023)    Housing Stability Vital Sign     Unable to Pay for Housing in the Last Year: No     Number of Places Lived in the Last Year: 1     Unstable Housing in the Last Year: No        Family History   Problem Relation Name Age of Onset    Diabetes Mother      Stroke Mother      Hypertension Father      Diabetes Father      Heart disease Father      Cancer Brother      Diabetes Brother       Review of patient's allergies indicates:  No Known Allergies      ROS:  Review of Systems   Constitutional: Negative.    HENT: Negative.     Eyes: Negative.    Respiratory: Negative.  Negative for shortness of breath.    Cardiovascular: Negative.    Gastrointestinal:  "Negative.    Genitourinary: Negative.    Musculoskeletal: Negative.    Skin: Negative.    Neurological: Negative.    Endo/Heme/Allergies: Negative.    Psychiatric/Behavioral: Negative.                                                                                                                                                                                  Negative except as stated in the history of present illness. See HPI for details.    PHYSICAL EXAM:  Visit Vitals  /78 (BP Location: Left arm, Patient Position: Sitting, BP Method: Large (Automatic))   Pulse 72   Temp 97.9 °F (36.6 °C) (Oral)   Resp 20   Ht 5' 1" (1.549 m)   Wt 72.9 kg (160 lb 11.5 oz)   SpO2 100%   BMI 30.37 kg/m²         Physical Exam  HENT:      Head: Normocephalic.      Nose: Nose normal.   Eyes:      Pupils: Pupils are equal, round, and reactive to light.   Cardiovascular:      Rate and Rhythm: Normal rate and regular rhythm.   Pulmonary:      Effort: Pulmonary effort is normal.   Abdominal:      General: Abdomen is flat. Bowel sounds are normal.      Palpations: Abdomen is soft.   Musculoskeletal:         General: Normal range of motion.      Cervical back: Normal range of motion.   Skin:     General: Skin is warm.   Neurological:      General: No focal deficit present.      Mental Status: She is alert.   Psychiatric:         Mood and Affect: Mood normal.       Current Outpatient Medications   Medication Instructions    acetaminophen (TYLENOL) 500 mg, Oral, Every 6 hours PRN    aspirin 81 mg, Oral, Daily    atorvastatin (LIPITOR) 80 mg, Oral, Daily    blood sugar diagnostic Strp 1 strip, Misc.(Non-Drug; Combo Route), 2 times daily, To check BG 2 times daily (AM morning fasting, and 2 hours after dinner), to use with insurance preferred meter    carvediloL (COREG) 12.5 mg, Oral, 2 times daily    clobetasol 0.05% (TEMOVATE) 0.05 % Oint Apply nightly x1 month, then every other night x1 month, then 2x/week going forward    " "cyanocobalamin (vitamin B-12) (VITAMIN B-12) 50 mcg, Oral, Daily    gabapentin (NEURONTIN) 300 mg, Oral, See admin instructions, 300mg in the morning and 300mg in the afternoon, take 600mg at night    HYDROcodone-acetaminophen (NORCO) 5-325 mg per tablet 1 tablet, Oral, 2 times daily PRN    lancets (LANCETS,THIN) Misc 1 Needle, Misc.(Non-Drug; Combo Route), 2 times daily    LANTUS SOLOSTAR U-100 INSULIN 26 Units, Subcutaneous, Daily    LIDOcaine (LIDODERM) 5 % 1 patch, Transdermal, Every 24 hours (non-standard times), Remove & Discard patch within 12 hours or as directed by MD    lisinopriL 10 mg, Oral, Daily    loratadine (CLARITIN) 10 mg, Oral, Daily    metFORMIN (GLUCOPHAGE) 1,000 mg, Oral, 2 times daily with meals    mirtazapine (REMERON) 15 mg, Oral, Nightly    multivitamin capsule 1 capsule, Oral, Daily    pen needle, diabetic 29 gauge x 1/2" Ndle   Insulin pen needles, See Instructions, Inuslin pen needles for once a day Lantus injection  E11.65, # 90 EA, 3 Refill(s), Pharmacy: [id number], 162, cm, Height/Length Dosing, 12/10/21 9:34:00 CST, 90.2, kg, Weight Dosing, 12/10/21 9:34:00 CST    vitamin D (VITAMIN D3) 1,000 Units, Oral, Daily        All medications, laboratory studies, cardiac diagnostic imaging reviewed.     Lab Results   Component Value Date    LDL 33.00 (L) 04/30/2024    LDL 29.00 (L) 01/26/2024    TRIG 83 04/30/2024    TRIG 85 01/26/2024    CREATININE 1.40 (H) 04/30/2024    MG 1.50 (L) 04/30/2024    K 4.8 04/30/2024        ASSESSMENT/PLAN:  Syncope- Denies any further syncopal episodes  - Carotid US -no hemodynamically significant stenosis to bilateral internal carotid arteries (12.6.23)  - 30 day monitor -underlying sinus rhythm with no significant arrhythmias noted (9.13.23-10.13.23)  - EF- preserved at 59% with no significant structural or valvular abnormalities per ECHO 7.24.23  - EKG today      HTN   - BP at goal today     - Continue Coreg 12.5 mg and Lisinopril 10 mg   - " Continue Low Na Diet and Exercise as tolerated      HTN  - LDL -33- at goal, total chol-88  - Continue atorvastatin 80mg. Zetia previously discontinued, however the patient continue with medication.  Instructed patient to discontinue ezetimibe at this time.  - Counseled on low-cholesterol diet and Exercise as tolerated     DM II  - A1C-7.1  - Management per PCP      EKG  Discontinue Zetia   Follow up in cardiology Clinic in 6 months with FLP  or sooner if needed  Follow up with PCP as directed

## 2024-07-01 NOTE — PATIENT INSTRUCTIONS
EKG  Discontinue Zetia   Follow up in cardiology Clinic in 6 months with FLP  or sooner if needed  Follow up with PCP as directed

## 2024-07-02 ENCOUNTER — OFFICE VISIT (OUTPATIENT)
Dept: PAIN MEDICINE | Facility: CLINIC | Age: 74
End: 2024-07-02
Payer: MEDICARE

## 2024-07-02 VITALS
BODY MASS INDEX: 30.34 KG/M2 | TEMPERATURE: 99 F | WEIGHT: 160.69 LBS | HEIGHT: 61 IN | SYSTOLIC BLOOD PRESSURE: 118 MMHG | DIASTOLIC BLOOD PRESSURE: 73 MMHG | HEART RATE: 71 BPM

## 2024-07-02 DIAGNOSIS — M54.16 LUMBAR RADICULITIS: ICD-10-CM

## 2024-07-02 DIAGNOSIS — M54.9 CHRONIC BACK PAIN GREATER THAN 3 MONTHS DURATION: Primary | ICD-10-CM

## 2024-07-02 DIAGNOSIS — M47.816 LUMBAR SPONDYLOSIS: ICD-10-CM

## 2024-07-02 DIAGNOSIS — M54.2 CHRONIC CERVICAL PAIN: ICD-10-CM

## 2024-07-02 DIAGNOSIS — Z98.1 HISTORY OF FUSION OF CERVICAL SPINE: ICD-10-CM

## 2024-07-02 DIAGNOSIS — G89.29 CHRONIC BACK PAIN GREATER THAN 3 MONTHS DURATION: Primary | ICD-10-CM

## 2024-07-02 DIAGNOSIS — G89.29 CHRONIC RIGHT-SIDED LOW BACK PAIN WITH RIGHT-SIDED SCIATICA: Chronic | ICD-10-CM

## 2024-07-02 DIAGNOSIS — M51.36 LUMBAR DEGENERATIVE DISC DISEASE: ICD-10-CM

## 2024-07-02 DIAGNOSIS — G89.29 CHRONIC CERVICAL PAIN: ICD-10-CM

## 2024-07-02 DIAGNOSIS — M54.41 CHRONIC RIGHT-SIDED LOW BACK PAIN WITH RIGHT-SIDED SCIATICA: Chronic | ICD-10-CM

## 2024-07-02 PROCEDURE — 3074F SYST BP LT 130 MM HG: CPT | Mod: CPTII,,, | Performed by: ANESTHESIOLOGY

## 2024-07-02 PROCEDURE — 4010F ACE/ARB THERAPY RXD/TAKEN: CPT | Mod: CPTII,,, | Performed by: ANESTHESIOLOGY

## 2024-07-02 PROCEDURE — 3061F NEG MICROALBUMINURIA REV: CPT | Mod: CPTII,,, | Performed by: ANESTHESIOLOGY

## 2024-07-02 PROCEDURE — 1160F RVW MEDS BY RX/DR IN RCRD: CPT | Mod: CPTII,,, | Performed by: ANESTHESIOLOGY

## 2024-07-02 PROCEDURE — 1125F AMNT PAIN NOTED PAIN PRSNT: CPT | Mod: CPTII,,, | Performed by: ANESTHESIOLOGY

## 2024-07-02 PROCEDURE — 3072F LOW RISK FOR RETINOPATHY: CPT | Mod: CPTII,,, | Performed by: ANESTHESIOLOGY

## 2024-07-02 PROCEDURE — 3066F NEPHROPATHY DOC TX: CPT | Mod: CPTII,,, | Performed by: ANESTHESIOLOGY

## 2024-07-02 PROCEDURE — 3078F DIAST BP <80 MM HG: CPT | Mod: CPTII,,, | Performed by: ANESTHESIOLOGY

## 2024-07-02 PROCEDURE — 3051F HG A1C>EQUAL 7.0%<8.0%: CPT | Mod: CPTII,,, | Performed by: ANESTHESIOLOGY

## 2024-07-02 PROCEDURE — 3288F FALL RISK ASSESSMENT DOCD: CPT | Mod: CPTII,,, | Performed by: ANESTHESIOLOGY

## 2024-07-02 PROCEDURE — 99203 OFFICE O/P NEW LOW 30 MIN: CPT | Mod: ,,, | Performed by: ANESTHESIOLOGY

## 2024-07-02 PROCEDURE — 1101F PT FALLS ASSESS-DOCD LE1/YR: CPT | Mod: CPTII,,, | Performed by: ANESTHESIOLOGY

## 2024-07-02 PROCEDURE — 3008F BODY MASS INDEX DOCD: CPT | Mod: CPTII,,, | Performed by: ANESTHESIOLOGY

## 2024-07-02 PROCEDURE — 1159F MED LIST DOCD IN RCRD: CPT | Mod: CPTII,,, | Performed by: ANESTHESIOLOGY

## 2024-07-02 NOTE — PROGRESS NOTES
Marycruz Atwood MD        PATIENT NAME: Marnie Briggs  : 1950  DATE: 24  MRN: [id number]      Billing Provider: Marycruz Atwood MD  Level of Service:   Patient PCP Information       Provider PCP Type    Kenzie Blackman MD General            Reason for Visit / Chief Complaint: Back Pain (Referred by Dr Espino, chronic right side sciatica, prior cervical fusion, no prior lumbar surgery had injections in the post, completed P.T about 1 month ago, norco, gabapentin  and tylenol for relief, pain level 8/10)       Update PCP  Update Chief Complaint         History of Present Illness / Problem Focused Workflow     Marnie Briggs presents to the clinic with Back Pain (Referred by Dr Espino, chronic right side sciatica, prior cervical fusion, no prior lumbar surgery had injections in the post, completed P.T about 1 month ago, norco, gabapentin  and tylenol for relief, pain level 8/10)     This is a 73-year-old female who presents to clinic today for her initial consultation as a referral from her primary care physician.  She complains of low back pain that has been present since she fell in the .  She has not undergone any lumbar spine surgery, although she did have cervical spine surgery several years ago.  She currently rates her pain as 8/10 on the NRS.  It gets down to 5/10 at best.  She finished a course of physical therapy about 1 month ago, which helped somewhat while she was going to it.  She is trying to continue doing the exercises and stretches at home.  She does recall undergoing some injections in her back a long time ago, but does not remember any details about it.  Pain radiates down the bilateral lower extremities into the posterior thighs.  She also reports numbness and tingling in her feet.      Back Pain  Associated symptoms include leg pain and numbness.       Review of Systems     Review of Systems   Musculoskeletal:  Positive for back pain and leg pain.   Neurological:  Positive for  numbness.   Psychiatric/Behavioral:  Positive for sleep disturbance.    All other systems reviewed and are negative.       Medical / Social / Family History     Past Medical History:   Diagnosis Date    CKD (chronic kidney disease) stage 2, GFR 60-89 ml/min     DM (diabetes mellitus)     HLD (hyperlipidemia)     HTN (hypertension)        Past Surgical History:   Procedure Laterality Date    ANKLE FUSION Right     CATARACT EXTRACTION Bilateral     CHOLECYSTECTOMY      COLONOSCOPY  11/30/2015    HYSTERECTOMY      NECK SURGERY      stint      Right kidney       Social History  Ms. Briggs  reports that she has never smoked. She has never been exposed to tobacco smoke. She has never used smokeless tobacco. She reports that she does not drink alcohol and does not use drugs.    Family History  Ms.'s Briggs family history includes Cancer in her brother; Diabetes in her brother, father, and mother; Heart disease in her father; Hypertension in her father; Stroke in her mother.    Medications and Allergies     Medications  Outpatient Medications Marked as Taking for the 7/2/24 encounter (Office Visit) with Marycruz Atwood MD   Medication Sig Dispense Refill    acetaminophen (TYLENOL) 500 MG tablet Take 500 mg by mouth every 6 (six) hours as needed for Pain.      aspirin 81 MG Chew Take 81 mg by mouth once daily at 6am.      atorvastatin (LIPITOR) 80 MG tablet Take 1 tablet (80 mg total) by mouth once daily. 90 tablet 3    blood sugar diagnostic Strp 1 strip by Misc.(Non-Drug; Combo Route) route 2 (two) times a day. To check BG 2 times daily (AM morning fasting, and 2 hours after dinner), to use with insurance preferred meter 99 each 2    carvediloL (COREG) 12.5 MG tablet Take 1 tablet (12.5 mg total) by mouth 2 (two) times daily. 180 tablet 3    clobetasol 0.05% (TEMOVATE) 0.05 % Oint Apply nightly x1 month, then every other night x1 month, then 2x/week going forward 60 g 6    cyanocobalamin, vitamin B-12, (VITAMIN B-12) 50  "mcg tablet Take 50 mcg by mouth once daily.      gabapentin (NEURONTIN) 300 MG capsule Take 1 capsule (300 mg total) by mouth As instructed (back pain). 300mg in the morning and 300mg in the afternoon, take 600mg at night      HYDROcodone-acetaminophen (NORCO) 5-325 mg per tablet Take 1 tablet by mouth 2 (two) times daily as needed for Pain. 60 tablet 0    lancets (LANCETS,THIN) Misc 1 Needle by Misc.(Non-Drug; Combo Route) route 2 (two) times a day. 100 each 5    LANTUS SOLOSTAR U-100 INSULIN glargine 100 units/mL SubQ pen Inject 26 Units into the skin once daily. 23.4 mL 2    LIDOcaine (LIDODERM) 5 % Place 1 patch onto the skin every 24 hours. Remove & Discard patch within 12 hours or as directed by MD      lisinopriL 10 MG tablet Take 1 tablet (10 mg total) by mouth once daily. 90 tablet 3    loratadine (CLARITIN) 10 mg tablet Take 10 mg by mouth once daily.      metFORMIN (GLUCOPHAGE) 1000 MG tablet Take 1 tablet (1,000 mg total) by mouth 2 (two) times daily with meals. 180 tablet 2    mirtazapine (REMERON) 15 MG tablet Take 15 mg by mouth every evening.      multivitamin capsule Take 1 capsule by mouth once daily.      pen needle, diabetic 29 gauge x 1/2" Ndle   Insulin pen needles, See Instructions, Inuslin pen needles for once a day Lantus injection  E11.65, # 90 EA, 3 Refill(s), Pharmacy: [id number] [id number], 162, cm, Height/Length Dosing, 12/10/21 9:34:00 CST, 90.2, kg, Weight Dosing, 12/10/21 9:34:00 CST      vitamin D (VITAMIN D3) 1000 units Tab Take 1,000 Units by mouth once daily.         Allergies  Review of patient's allergies indicates:  No Known Allergies    Physical Examination     Vitals:    07/02/24 1057   BP: 118/73   Pulse: 71   Temp: 98.5 °F (36.9 °C)     Pain Disability Index (PDI): 48       Spine Musculoskeletal Exam    Gait    Gait is normal.    Inspection    Thoracolumbar    Thoracolumbar inspection is normal.    Palpation    Thoracolumbar    Tenderness: none    Right      Masses: none   "    Spasms: none      Crepitus: none      Muscle tone: normal    Left      Masses: none      Spasms: none      Crepitus: none      Muscle tone: normal    Range of Motion    Thoracolumbar        Thoracolumbar range of motion additional comments: Limited range of motion in lumbar spine due to pain.    Strength    Thoracolumbar    Thoracolumbar motor exam is normal.       Sensory    Thoracolumbar    Thoracolumbar sensation is normal.    General      Constitutional: appears stated age, well-developed and well-nourished    Scleral icterus: no    Labored breathing: no    Psychiatric: normal mood and affect and no acute distress    Neurological: alert and oriented x3    Skin: intact    Lymphadenopathy: none  CV: extremities warm, well-perfused  Resp: nonlabored breathing    Assessment and Plan (including Health Maintenance)      Problem List  Smart Sets  Document Outside HM   :    Plan:   Chronic back pain greater than 3 months duration    Chronic right-sided low back pain with right-sided sciatica  -     Ambulatory referral/consult to Pain Clinic    History of fusion of cervical spine  -     Ambulatory referral/consult to Pain Clinic    Chronic cervical pain  -     Ambulatory referral/consult to Pain Clinic    Lumbar degenerative disc disease    Lumbar radiculitis       She is being scheduled for right L4 and left L5 transforaminal epidural steroid injections today to help with her chronic low back pain radiating down the bilateral lower extremities, consistent with findings of degenerative disc disease and foraminal stenosis seen on her MRI.  The plan was discussed with the patient and she wishes to proceed.    Problem List Items Addressed This Visit          Neuro    Lumbar degenerative disc disease    Lumbar radiculitis       Orthopedic    Chronic right-sided low back pain with right-sided sciatica (Chronic)     Other Visit Diagnoses       Chronic back pain greater than 3 months duration    -  Primary    History of  fusion of cervical spine        Chronic cervical pain                  Future Appointments   Date Time Provider Department Center   8/22/2024 10:15 AM Pavithra Hernandez, NP [facility] ENDOCR [place]    9/18/2024 10:00 AM Kenzie Blackman MD [facility] FM RES [place] [facility]   9/25/2024  2:00 PM PROVIDERS, [facility] [place]    9/30/2024  7:30 AM Margaret Briones, ANP [facility] GYN [place] [facility]   10/4/2024  8:00 AM Susan Varma MD [facility] NEPHR [place]    1/2/2025  9:15 AM Tony Bhatti, LUCIENP [facility] CARD [place]         There are no Patient Instructions on file for this visit.  No follow-ups on file.     Signature:  Marycruz Atwood MD      Date of encounter: 7/2/24

## 2024-07-07 ENCOUNTER — HOSPITAL ENCOUNTER (EMERGENCY)
Facility: HOSPITAL | Age: 74
Discharge: HOME | End: 2024-07-07
Attending: FAMILY MEDICINE
Payer: MEDICARE

## 2024-07-07 VITALS
HEIGHT: 64 IN | RESPIRATION RATE: 18 BRPM | TEMPERATURE: 99 F | DIASTOLIC BLOOD PRESSURE: 85 MMHG | OXYGEN SATURATION: 97 % | HEART RATE: 95 BPM | BODY MASS INDEX: 30.22 KG/M2 | WEIGHT: 177 LBS | SYSTOLIC BLOOD PRESSURE: 135 MMHG

## 2024-07-07 DIAGNOSIS — T78.1XXA ALLERGIC REACTION TO FOOD, INITIAL ENCOUNTER: Primary | ICD-10-CM

## 2024-07-07 DIAGNOSIS — R11.2 NAUSEA & VOMITING: ICD-10-CM

## 2024-07-07 DIAGNOSIS — E86.0 DEHYDRATION: ICD-10-CM

## 2024-07-07 DIAGNOSIS — N30.00 ACUTE CYSTITIS WITHOUT HEMATURIA: ICD-10-CM

## 2024-07-07 LAB
ALBUMIN SERPL-MCNC: 3.8 G/DL (ref 3.4–4.8)
ALBUMIN/GLOB SERPL: 1.3 RATIO (ref 1.1–2)
ALP SERPL-CCNC: 72 UNIT/L (ref 40–150)
ALT SERPL-CCNC: 15 UNIT/L (ref 0–55)
ANION GAP SERPL CALC-SCNC: 11 MEQ/L
AST SERPL-CCNC: 15 UNIT/L (ref 5–34)
BACTERIA #/AREA URNS AUTO: ABNORMAL /HPF
BASOPHILS # BLD AUTO: 0.01 X10(3)/MCL
BASOPHILS NFR BLD AUTO: 0.1 %
BILIRUB SERPL-MCNC: 0.9 MG/DL
BILIRUB UR QL STRIP.AUTO: NEGATIVE
BUN SERPL-MCNC: 21 MG/DL (ref 9.8–20.1)
CALCIUM SERPL-MCNC: 9 MG/DL (ref 8.4–10.2)
CHLORIDE SERPL-SCNC: 105 MMOL/L (ref 98–107)
CK MB SERPL-MCNC: 1.9 NG/ML
CK SERPL-CCNC: 87 U/L (ref 29–168)
CLARITY UR: ABNORMAL
CO2 SERPL-SCNC: 25 MMOL/L (ref 23–31)
COLOR UR AUTO: YELLOW
CREAT SERPL-MCNC: 1.33 MG/DL (ref 0.55–1.02)
CREAT/UREA NIT SERPL: 16
EOSINOPHIL # BLD AUTO: 0.09 X10(3)/MCL (ref 0–0.9)
EOSINOPHIL NFR BLD AUTO: 1 %
ERYTHROCYTE [DISTWIDTH] IN BLOOD BY AUTOMATED COUNT: 14.3 % (ref 11.5–17)
FLUAV AG UPPER RESP QL IA.RAPID: NOT DETECTED
FLUBV AG UPPER RESP QL IA.RAPID: NOT DETECTED
GFR SERPLBLD CREATININE-BSD FMLA CKD-EPI: 43 ML/MIN/1.73/M2
GLOBULIN SER-MCNC: 3 GM/DL (ref 2.4–3.5)
GLUCOSE SERPL-MCNC: 240 MG/DL (ref 82–115)
GLUCOSE UR QL STRIP: ABNORMAL
HCT VFR BLD AUTO: 38.7 % (ref 37–47)
HGB BLD-MCNC: 12.1 G/DL (ref 12–16)
HGB UR QL STRIP: ABNORMAL
IMM GRANULOCYTES # BLD AUTO: 0.02 X10(3)/MCL (ref 0–0.04)
IMM GRANULOCYTES NFR BLD AUTO: 0.2 %
KETONES UR QL STRIP: NEGATIVE
LEUKOCYTE ESTERASE UR QL STRIP: ABNORMAL
LYMPHOCYTES # BLD AUTO: 0.94 X10(3)/MCL (ref 0.6–4.6)
LYMPHOCYTES NFR BLD AUTO: 10 %
MCH RBC QN AUTO: 27.3 PG (ref 27–31)
MCHC RBC AUTO-ENTMCNC: 31.3 G/DL (ref 33–36)
MCV RBC AUTO: 87.2 FL (ref 80–94)
MONOCYTES # BLD AUTO: 0.2 X10(3)/MCL (ref 0.1–1.3)
MONOCYTES NFR BLD AUTO: 2.1 %
NEUTROPHILS # BLD AUTO: 8.13 X10(3)/MCL (ref 2.1–9.2)
NEUTROPHILS NFR BLD AUTO: 86.6 %
NITRITE UR QL STRIP: POSITIVE
NRBC BLD AUTO-RTO: 0 %
PH UR STRIP: 6 [PH]
PLATELET # BLD AUTO: 211 X10(3)/MCL (ref 130–400)
PMV BLD AUTO: 9.8 FL (ref 7.4–10.4)
POCT GLUCOSE: 172 MG/DL (ref 70–110)
POTASSIUM SERPL-SCNC: 4.2 MMOL/L (ref 3.5–5.1)
PROT SERPL-MCNC: 6.8 GM/DL (ref 5.8–7.6)
PROT UR QL STRIP: ABNORMAL
RBC # BLD AUTO: 4.44 X10(6)/MCL (ref 4.2–5.4)
RBC #/AREA URNS AUTO: ABNORMAL /HPF
RSV A 5' UTR RNA NPH QL NAA+PROBE: NOT DETECTED
SARS-COV-2 RNA RESP QL NAA+PROBE: NOT DETECTED
SODIUM SERPL-SCNC: 141 MMOL/L (ref 136–145)
SP GR UR STRIP.AUTO: 1.02 (ref 1–1.03)
SQUAMOUS #/AREA URNS AUTO: ABNORMAL /HPF
TROPONIN I SERPL-MCNC: 0.01 NG/ML (ref 0–0.04)
UROBILINOGEN UR STRIP-ACNC: 0.2
WBC # BLD AUTO: 9.39 X10(3)/MCL (ref 4.5–11.5)
WBC #/AREA URNS AUTO: ABNORMAL /HPF

## 2024-07-07 PROCEDURE — 99900031 HC PATIENT EDUCATION (STAT)

## 2024-07-07 PROCEDURE — 82550 ASSAY OF CK (CPK): CPT | Performed by: FAMILY MEDICINE

## 2024-07-07 PROCEDURE — 87086 URINE CULTURE/COLONY COUNT: CPT | Performed by: FAMILY MEDICINE

## 2024-07-07 PROCEDURE — 99284 EMERGENCY DEPT VISIT MOD MDM: CPT | Mod: 25

## 2024-07-07 PROCEDURE — 87077 CULTURE AEROBIC IDENTIFY: CPT | Mod: 59 | Performed by: FAMILY MEDICINE

## 2024-07-07 PROCEDURE — 87040 BLOOD CULTURE FOR BACTERIA: CPT | Performed by: FAMILY MEDICINE

## 2024-07-07 PROCEDURE — 96376 TX/PRO/DX INJ SAME DRUG ADON: CPT

## 2024-07-07 PROCEDURE — 84484 ASSAY OF TROPONIN QUANT: CPT | Performed by: FAMILY MEDICINE

## 2024-07-07 PROCEDURE — 87186 SC STD MICRODIL/AGAR DIL: CPT | Mod: 59 | Performed by: FAMILY MEDICINE

## 2024-07-07 PROCEDURE — 93005 ELECTROCARDIOGRAM TRACING: CPT

## 2024-07-07 PROCEDURE — 0241U COVID/RSV/FLU A&B PCR: CPT | Performed by: FAMILY MEDICINE

## 2024-07-07 PROCEDURE — 25000003 PHARM REV CODE 250: Performed by: FAMILY MEDICINE

## 2024-07-07 PROCEDURE — 80053 COMPREHEN METABOLIC PANEL: CPT | Performed by: FAMILY MEDICINE

## 2024-07-07 PROCEDURE — 96361 HYDRATE IV INFUSION ADD-ON: CPT

## 2024-07-07 PROCEDURE — 81001 URINALYSIS AUTO W/SCOPE: CPT | Performed by: FAMILY MEDICINE

## 2024-07-07 PROCEDURE — 82553 CREATINE MB FRACTION: CPT | Performed by: FAMILY MEDICINE

## 2024-07-07 PROCEDURE — 96375 TX/PRO/DX INJ NEW DRUG ADDON: CPT

## 2024-07-07 PROCEDURE — 96365 THER/PROPH/DIAG IV INF INIT: CPT

## 2024-07-07 PROCEDURE — 85025 COMPLETE CBC W/AUTO DIFF WBC: CPT | Performed by: FAMILY MEDICINE

## 2024-07-07 PROCEDURE — 63600175 PHARM REV CODE 636 W HCPCS: Performed by: FAMILY MEDICINE

## 2024-07-07 RX ORDER — ONDANSETRON HYDROCHLORIDE 2 MG/ML
4 INJECTION, SOLUTION INTRAVENOUS
Status: COMPLETED | OUTPATIENT
Start: 2024-07-07 | End: 2024-07-07

## 2024-07-07 RX ORDER — DIPHENHYDRAMINE HYDROCHLORIDE 50 MG/ML
25 INJECTION INTRAMUSCULAR; INTRAVENOUS
Status: COMPLETED | OUTPATIENT
Start: 2024-07-07 | End: 2024-07-07

## 2024-07-07 RX ORDER — FAMOTIDINE 10 MG/ML
40 INJECTION INTRAVENOUS
Status: COMPLETED | OUTPATIENT
Start: 2024-07-07 | End: 2024-07-07

## 2024-07-07 RX ORDER — DICYCLOMINE HYDROCHLORIDE 20 MG/1
20 TABLET ORAL 2 TIMES DAILY PRN
Qty: 20 TABLET | Refills: 0 | Status: SHIPPED | OUTPATIENT
Start: 2024-07-07

## 2024-07-07 RX ORDER — CIPROFLOXACIN 500 MG/1
500 TABLET ORAL 2 TIMES DAILY
Qty: 20 TABLET | Refills: 0 | Status: SHIPPED | OUTPATIENT
Start: 2024-07-07 | End: 2024-07-17

## 2024-07-07 RX ORDER — DIPHENHYDRAMINE HCL 25 MG
25 CAPSULE ORAL EVERY 12 HOURS PRN
Qty: 12 CAPSULE | Refills: 0 | Status: SHIPPED | OUTPATIENT
Start: 2024-07-07

## 2024-07-07 RX ORDER — ONDANSETRON 4 MG/1
4 TABLET, FILM COATED ORAL EVERY 6 HOURS
Qty: 12 TABLET | Refills: 0 | Status: SHIPPED | OUTPATIENT
Start: 2024-07-07

## 2024-07-07 RX ADMIN — DIPHENHYDRAMINE HYDROCHLORIDE 25 MG: 50 INJECTION INTRAMUSCULAR; INTRAVENOUS at 04:07

## 2024-07-07 RX ADMIN — SODIUM CHLORIDE 1000 ML: 9 INJECTION, SOLUTION INTRAVENOUS at 02:07

## 2024-07-07 RX ADMIN — ONDANSETRON 4 MG: 2 INJECTION INTRAMUSCULAR; INTRAVENOUS at 04:07

## 2024-07-07 RX ADMIN — ONDANSETRON 4 MG: 2 INJECTION INTRAMUSCULAR; INTRAVENOUS at 02:07

## 2024-07-07 RX ADMIN — FAMOTIDINE 40 MG: 10 INJECTION, SOLUTION INTRAVENOUS at 04:07

## 2024-07-07 RX ADMIN — CEFTRIAXONE SODIUM 1 G: 1 INJECTION, POWDER, FOR SOLUTION INTRAMUSCULAR; INTRAVENOUS at 03:07

## 2024-07-07 NOTE — ED NOTES
Pt ambulated to room 3 w/steady gait. Pt states she was in the car when she developed the shakes. Pt was unable to check her glucose so her family thought it would be a good idea to come in. Pt denies any pain, fever, or sinus problems. While getting vitals pt began throwing up. No fever upon arrival. Pt states she did take all of her morning medications. Denies anyone being sick around her.

## 2024-07-07 NOTE — ED PROVIDER NOTES
Encounter Date: 7/7/2024       History     Chief Complaint   Patient presents with    Chills     C/o chills started today. Denies any other symptoms. Patient thought it was her sugar.. Nausea with vomiting started during triage.       This patient is a 73-year-old female with a history of diabetes.  Patient states that her and her daughter were on their way to [place] when she felt bad.  Patient states that she is starting it chills and nausea.  They decided to come to the emergency room.  Patient did not complain of any pain.  But after approximately 15 minutes of being in the emergency room, patient had an episode of vomiting.  Patient states that she had some shrimp stew for lunch, but everybody had the same thing in nobody sick accept her.  In the emergency room she continued to have 2 more episodes of vomiting and started having diarrhea as well.  Patient states that she has not allergic to shrimp however I believe that she may be having a reaction to the shrimp    The history is provided by the patient.     Review of patient's allergies indicates:  No Known Allergies  Past Medical History:   Diagnosis Date    CKD (chronic kidney disease) stage 2, GFR 60-89 ml/min     DM (diabetes mellitus)     HLD (hyperlipidemia)     HTN (hypertension)      Past Surgical History:   Procedure Laterality Date    ANKLE FUSION Right     CATARACT EXTRACTION Bilateral     CHOLECYSTECTOMY      COLONOSCOPY  11/30/2015    HYSTERECTOMY      NECK SURGERY      stint      Right kidney     Family History   Problem Relation Name Age of Onset    Diabetes Mother      Stroke Mother      Hypertension Father      Diabetes Father      Heart disease Father      Cancer Brother      Diabetes Brother       Social History     Tobacco Use    Smoking status: Never     Passive exposure: Never    Smokeless tobacco: Never   Substance Use Topics    Alcohol use: Never    Drug use: Never     Review of Systems   Constitutional:  Positive for chills.    HENT: Negative.     Respiratory: Negative.     Cardiovascular: Negative.    Gastrointestinal:  Positive for diarrhea, nausea and vomiting.   Endocrine: Negative.    Genitourinary: Negative.    Musculoskeletal: Negative.    Neurological:  Positive for weakness.   Psychiatric/Behavioral: Negative.     All other systems reviewed and are negative.      Physical Exam     Initial Vitals [07/07/24 1353]   BP Pulse Resp Temp SpO2   (!) 157/104 105 20 98.8 °F (37.1 °C) 97 %      MAP       --         Physical Exam    Nursing note and vitals reviewed.  Constitutional: She appears well-developed.   HENT:   Head: Normocephalic.   Eyes: Pupils are equal, round, and reactive to light.   Neck:   Normal range of motion.  Cardiovascular:  Normal rate, regular rhythm and normal heart sounds.           Pulmonary/Chest: Breath sounds normal.   Abdominal: Abdomen is soft. Bowel sounds are increased. There is no abdominal tenderness.   Musculoskeletal:         General: Normal range of motion.      Cervical back: Normal range of motion.     Neurological: She is alert and oriented to person, place, and time.   Skin: Skin is warm and dry.   Psychiatric: She has a normal mood and affect.         ED Course   Critical Care    Date/Time: 7/7/2024 5:25 PM    Performed by: Michele Liang MD  Authorized by: Michele Liang MD  Direct patient critical care time: 20 minutes  Additional history critical care time: 10 minutes  Ordering / reviewing critical care time: 10 minutes  Documentation critical care time: 10 minutes  Consulting other physicians critical care time: 10 minutes  Consult with family critical care time: 10 minutes  Total critical care time (exclusive of procedural time) : 70 minutes  Critical care was necessary to treat or prevent imminent or life-threatening deterioration of the following conditions: dehydration and sepsis.  Critical care was time spent personally by me on the following activities:  examination of patient, obtaining history from patient or surrogate, ordering and performing treatments and interventions, ordering and review of laboratory studies and ordering and review of radiographic studies.        Labs Reviewed   COMPREHENSIVE METABOLIC PANEL - Abnormal; Notable for the following components:       Result Value    Glucose 240 (*)     Blood Urea Nitrogen 21.0 (*)     Creatinine 1.33 (*)     All other components within normal limits   URINALYSIS, REFLEX TO URINE CULTURE - Abnormal; Notable for the following components:    Appearance, UA SL CLOUDY (*)     Protein, UA 1+ (*)     Glucose, UA Trace (*)     Blood, UA Trace (*)     Nitrites, UA Positive (*)     Leukocyte Esterase, UA Trace (*)     All other components within normal limits   CBC WITH DIFFERENTIAL - Abnormal; Notable for the following components:    MCHC 31.3 (*)     All other components within normal limits   URINALYSIS, MICROSCOPIC - Abnormal; Notable for the following components:    Bacteria, UA Many (*)     WBC, UA 21-50 (*)     Squamous Epithelial Cells, UA Few (*)     All other components within normal limits   POCT GLUCOSE - Abnormal; Notable for the following components:    POCT Glucose 172 (*)     All other components within normal limits   COVID/RSV/FLU A&B PCR - Normal    Narrative:     The Xpert Xpress SARS-CoV-2/FLU/RSV plus is a rapid, multiplexed real-time PCR test intended for the simultaneous qualitative detection and differentiation of SARS-CoV-2, Influenza A, Influenza B, and respiratory syncytial virus (RSV) viral RNA in either nasopharyngeal swab or nasal swab specimens.         CK - Normal   CK-MB - Normal   TROPONIN I - Normal   CULTURE, URINE   BLOOD CULTURE OLG   BLOOD CULTURE OLG   CBC W/ AUTO DIFFERENTIAL    Narrative:     The following orders were created for panel order CBC auto differential.  Procedure                               Abnormality         Status                     ---------                                -----------         ------                     CBC with Differential[[id number]]       Abnormal            Final result                 Please view results for these tests on the individual orders.   POCT GLUCOSE, HAND-HELD DEVICE     EKG Readings: (Independently Interpreted)   Initial Reading: No STEMI. Rhythm: Sinus Tachycardia. Heart Rate: 104. Ectopy: No Ectopy. ST Segments: Normal ST Segments.     ECG Results              EKG 12-lead (In process)        Collection Time Result Time QRS Duration OHS QTC Calculation    07/07/24 14:12:41 07/07/24 16:43:21 108 462                     In process by Interface, Lab In [facility] (07/07/24 16:43:26)                   Narrative:    Test Reason : R11.2,    Vent. Rate : 104 BPM     Atrial Rate : 104 BPM     P-R Int : 148 ms          QRS Dur : 108 ms      QT Int : 352 ms       P-R-T Axes : 044 -49 017 degrees     QTc Int : 462 ms    Sinus tachycardia with Premature supraventricular complexes  Incomplete right bundle branch block  Left anterior fascicular block  Abnormal ECG  When compared with ECG of 01-JUL-2024 09:09,  Premature supraventricular complexes are now Present  Left anterior fascicular block is now Present    Referred By: [email or web address]   SELF           Confirmed By:                                   Imaging Results    None          Medications   sodium chloride 0.9% bolus 1,000 mL 1,000 mL (0 mLs Intravenous Stopped 7/7/24 1601)   ondansetron injection 4 mg (4 mg Intravenous Given 7/7/24 1400)   cefTRIAXone (Rocephin) 1 g in D5W 100 mL IVPB (MB+) (0 g Intravenous Stopped 7/7/24 1616)   ondansetron injection 4 mg (4 mg Intravenous Given 7/7/24 1601)   famotidine (PF) injection 40 mg (40 mg Intravenous Given 7/7/24 1608)   diphenhydrAMINE injection 25 mg (25 mg Intravenous Given 7/7/24 1608)     Medical Decision Making  This patient is a 73-year-old female who comes in with weakness, and chills.  Patient states that she ate for lunch as did her family members.   Upon arrival to the ER patient is started vomiting, had come total of 3 episodes of vomiting.  She also had diarrhea  .  Differential diagnosis:  Dehydration, urinary tract infection, food poisoning, reaction to shrimp, flu, COVID    Amount and/or Complexity of Data Reviewed  Labs: ordered.     Details: Patient's lab work shows that she has many bacteria with a cloudy urine, 1+ protein, trace glucose trace blood positive for nitrites and trace leukocyte esterase.  Patient has 21-50 WBCs.  She was given 1 g of Rocephin in the emergency room.  Patient's cardiac workup is negative, her glucose is 240, BUN is 21 with a creatinine of 1.33 and her CBC is normal.  Patient says negative for the flu and COVID    Risk  Prescription drug management.                                      Clinical Impression:  Final diagnoses:  [R11.2] Nausea & vomiting  [T78.1XXA] Allergic reaction to food, initial encounter (Primary)  [E86.0] Dehydration  [N30.00] Acute cystitis without hematuria          ED Disposition Condition    Discharge Stable          ED Prescriptions       Medication Sig Dispense Start Date End Date Auth. Provider    diphenhydrAMINE (BENADRYL) 25 mg capsule Take 1 capsule (25 mg total) by mouth every 12 (twelve) hours as needed for Itching or Allergies. 12 capsule 7/7/2024 -- Michele Liang MD    dicyclomine (BENTYL) 20 mg tablet Take 1 tablet (20 mg total) by mouth 2 (two) times daily as needed (diarrhea). 20 tablet 7/7/2024 -- Michele Liang MD    ondansetron (ZOFRAN) 4 MG tablet Take 1 tablet (4 mg total) by mouth every 6 (six) hours. 12 tablet 7/7/2024 -- Michele Liang MD    ciprofloxacin HCl (CIPRO) 500 MG tablet Take 1 tablet (500 mg total) by mouth 2 (two) times daily. for 10 days 20 tablet 7/7/2024 7/17/2024 Michele Liang MD          Follow-up Information       Follow up With Specialties Details Why Contact Info    Kenzie Blackman MD Family Medicine Schedule  an appointment as soon as possible for a visit in 2 days  [address] [facility] [address]  [phone]               Michele Liang MD  07/07/24 5952

## 2024-07-07 NOTE — ED NOTES
Pt had a diarrhea episode while vomiting. The stool was loose w/no blood. Pt was cleaned, clothes changed and new trey placed on bed. Pts clothes were placed in belonging bags and given to family. MD underwood.

## 2024-07-08 ENCOUNTER — HOSPITAL ENCOUNTER (INPATIENT)
Facility: HOSPITAL | Age: 74
LOS: 1 days | Discharge: HOME | DRG: 690 | End: 2024-07-10
Attending: STUDENT IN AN ORGANIZED HEALTH CARE EDUCATION/TRAINING PROGRAM | Admitting: INTERNAL MEDICINE
Payer: MEDICARE

## 2024-07-08 ENCOUNTER — TELEPHONE (OUTPATIENT)
Dept: EMERGENCY MEDICINE | Facility: HOSPITAL | Age: 74
End: 2024-07-08
Payer: MEDICARE

## 2024-07-08 DIAGNOSIS — R78.81 BACTEREMIA: Primary | ICD-10-CM

## 2024-07-08 LAB
ALBUMIN SERPL-MCNC: 3.5 G/DL (ref 3.4–4.8)
ALBUMIN/GLOB SERPL: 1.1 RATIO (ref 1.1–2)
ALP SERPL-CCNC: 60 UNIT/L (ref 40–150)
ALT SERPL-CCNC: 16 UNIT/L (ref 0–55)
ANION GAP SERPL CALC-SCNC: 10 MEQ/L
AST SERPL-CCNC: 18 UNIT/L (ref 5–34)
BASOPHILS # BLD AUTO: 0.02 X10(3)/MCL
BASOPHILS NFR BLD AUTO: 0.2 %
BILIRUB SERPL-MCNC: 0.5 MG/DL
BUN SERPL-MCNC: 20 MG/DL (ref 9.8–20.1)
CALCIUM SERPL-MCNC: 9 MG/DL (ref 8.4–10.2)
CHLORIDE SERPL-SCNC: 107 MMOL/L (ref 98–107)
CO2 SERPL-SCNC: 24 MMOL/L (ref 23–31)
CREAT SERPL-MCNC: 1.3 MG/DL (ref 0.55–1.02)
CREAT/UREA NIT SERPL: 15
EOSINOPHIL # BLD AUTO: 0.17 X10(3)/MCL (ref 0–0.9)
EOSINOPHIL NFR BLD AUTO: 1.8 %
ERYTHROCYTE [DISTWIDTH] IN BLOOD BY AUTOMATED COUNT: 14.1 % (ref 11.5–17)
GFR SERPLBLD CREATININE-BSD FMLA CKD-EPI: 44 ML/MIN/1.73/M2
GLOBULIN SER-MCNC: 3.3 GM/DL (ref 2.4–3.5)
GLUCOSE SERPL-MCNC: 208 MG/DL (ref 82–115)
HCT VFR BLD AUTO: 34.2 % (ref 37–47)
HGB BLD-MCNC: 10.8 G/DL (ref 12–16)
IMM GRANULOCYTES # BLD AUTO: 0.02 X10(3)/MCL (ref 0–0.04)
IMM GRANULOCYTES NFR BLD AUTO: 0.2 %
LYMPHOCYTES # BLD AUTO: 2.4 X10(3)/MCL (ref 0.6–4.6)
LYMPHOCYTES NFR BLD AUTO: 26 %
MCH RBC QN AUTO: 27.4 PG (ref 27–31)
MCHC RBC AUTO-ENTMCNC: 31.6 G/DL (ref 33–36)
MCV RBC AUTO: 86.8 FL (ref 80–94)
MONOCYTES # BLD AUTO: 0.65 X10(3)/MCL (ref 0.1–1.3)
MONOCYTES NFR BLD AUTO: 7 %
NEUTROPHILS # BLD AUTO: 5.96 X10(3)/MCL (ref 2.1–9.2)
NEUTROPHILS NFR BLD AUTO: 64.8 %
NRBC BLD AUTO-RTO: 0 %
OHS QRS DURATION: 108 MS
OHS QTC CALCULATION: 462 MS
PLATELET # BLD AUTO: 180 X10(3)/MCL (ref 130–400)
PMV BLD AUTO: 10 FL (ref 7.4–10.4)
POTASSIUM SERPL-SCNC: 4 MMOL/L (ref 3.5–5.1)
PROT SERPL-MCNC: 6.8 GM/DL (ref 5.8–7.6)
RBC # BLD AUTO: 3.94 X10(6)/MCL (ref 4.2–5.4)
SODIUM SERPL-SCNC: 141 MMOL/L (ref 136–145)
WBC # BLD AUTO: 9.22 X10(3)/MCL (ref 4.5–11.5)

## 2024-07-08 PROCEDURE — G0378 HOSPITAL OBSERVATION PER HR: HCPCS

## 2024-07-08 PROCEDURE — 25000003 PHARM REV CODE 250: Performed by: STUDENT IN AN ORGANIZED HEALTH CARE EDUCATION/TRAINING PROGRAM

## 2024-07-08 PROCEDURE — 85025 COMPLETE CBC W/AUTO DIFF WBC: CPT | Performed by: STUDENT IN AN ORGANIZED HEALTH CARE EDUCATION/TRAINING PROGRAM

## 2024-07-08 PROCEDURE — 63600175 PHARM REV CODE 636 W HCPCS: Performed by: STUDENT IN AN ORGANIZED HEALTH CARE EDUCATION/TRAINING PROGRAM

## 2024-07-08 PROCEDURE — 80053 COMPREHEN METABOLIC PANEL: CPT | Performed by: STUDENT IN AN ORGANIZED HEALTH CARE EDUCATION/TRAINING PROGRAM

## 2024-07-08 RX ORDER — SODIUM CHLORIDE 0.9 % (FLUSH) 0.9 %
10 SYRINGE (ML) INJECTION
Status: DISCONTINUED | OUTPATIENT
Start: 2024-07-08 | End: 2024-07-10 | Stop reason: HOSPADM

## 2024-07-08 RX ORDER — HYDROCODONE BITARTRATE AND ACETAMINOPHEN 5; 325 MG/1; MG/1
1 TABLET ORAL EVERY 12 HOURS PRN
Status: DISCONTINUED | OUTPATIENT
Start: 2024-07-08 | End: 2024-07-10 | Stop reason: HOSPADM

## 2024-07-08 RX ORDER — GLUCAGON 1 MG
1 KIT INJECTION
Status: DISCONTINUED | OUTPATIENT
Start: 2024-07-08 | End: 2024-07-10 | Stop reason: HOSPADM

## 2024-07-08 RX ORDER — INSULIN ASPART 100 [IU]/ML
0-5 INJECTION, SOLUTION INTRAVENOUS; SUBCUTANEOUS
Status: DISCONTINUED | OUTPATIENT
Start: 2024-07-08 | End: 2024-07-10 | Stop reason: HOSPADM

## 2024-07-08 RX ORDER — IBUPROFEN 200 MG
16 TABLET ORAL
Status: DISCONTINUED | OUTPATIENT
Start: 2024-07-08 | End: 2024-07-10 | Stop reason: HOSPADM

## 2024-07-08 RX ORDER — IBUPROFEN 200 MG
24 TABLET ORAL
Status: DISCONTINUED | OUTPATIENT
Start: 2024-07-08 | End: 2024-07-10 | Stop reason: HOSPADM

## 2024-07-08 RX ADMIN — CEFTRIAXONE SODIUM 1 G: 1 INJECTION, POWDER, FOR SOLUTION INTRAMUSCULAR; INTRAVENOUS at 09:07

## 2024-07-09 PROBLEM — R78.81 BACTEREMIA: Status: ACTIVE | Noted: 2024-07-09

## 2024-07-09 LAB
ANION GAP SERPL CALC-SCNC: 8 MEQ/L
BASOPHILS # BLD AUTO: 0.02 X10(3)/MCL
BASOPHILS NFR BLD AUTO: 0.2 %
BUN SERPL-MCNC: 18 MG/DL (ref 9.8–20.1)
CALCIUM SERPL-MCNC: 8.7 MG/DL (ref 8.4–10.2)
CHLORIDE SERPL-SCNC: 111 MMOL/L (ref 98–107)
CO2 SERPL-SCNC: 25 MMOL/L (ref 23–31)
CREAT SERPL-MCNC: 1.15 MG/DL (ref 0.55–1.02)
CREAT/UREA NIT SERPL: 16
EOSINOPHIL # BLD AUTO: 0.14 X10(3)/MCL (ref 0–0.9)
EOSINOPHIL NFR BLD AUTO: 1.7 %
ERYTHROCYTE [DISTWIDTH] IN BLOOD BY AUTOMATED COUNT: 14.1 % (ref 11.5–17)
GFR SERPLBLD CREATININE-BSD FMLA CKD-EPI: 51 ML/MIN/1.73/M2
GLUCOSE SERPL-MCNC: 148 MG/DL (ref 82–115)
HCT VFR BLD AUTO: 32.8 % (ref 37–47)
HGB BLD-MCNC: 10.1 G/DL (ref 12–16)
IMM GRANULOCYTES # BLD AUTO: 0.02 X10(3)/MCL (ref 0–0.04)
IMM GRANULOCYTES NFR BLD AUTO: 0.2 %
LYMPHOCYTES # BLD AUTO: 2.8 X10(3)/MCL (ref 0.6–4.6)
LYMPHOCYTES NFR BLD AUTO: 33.7 %
MCH RBC QN AUTO: 27.3 PG (ref 27–31)
MCHC RBC AUTO-ENTMCNC: 30.8 G/DL (ref 33–36)
MCV RBC AUTO: 88.6 FL (ref 80–94)
MONOCYTES # BLD AUTO: 0.59 X10(3)/MCL (ref 0.1–1.3)
MONOCYTES NFR BLD AUTO: 7.1 %
NEUTROPHILS # BLD AUTO: 4.74 X10(3)/MCL (ref 2.1–9.2)
NEUTROPHILS NFR BLD AUTO: 57.1 %
NRBC BLD AUTO-RTO: 0 %
PLATELET # BLD AUTO: 174 X10(3)/MCL (ref 130–400)
PMV BLD AUTO: 9.8 FL (ref 7.4–10.4)
POCT GLUCOSE: 152 MG/DL (ref 70–110)
POCT GLUCOSE: 161 MG/DL (ref 70–110)
POCT GLUCOSE: 162 MG/DL (ref 70–110)
POCT GLUCOSE: 227 MG/DL (ref 70–110)
POTASSIUM SERPL-SCNC: 4 MMOL/L (ref 3.5–5.1)
RBC # BLD AUTO: 3.7 X10(6)/MCL (ref 4.2–5.4)
SODIUM SERPL-SCNC: 144 MMOL/L (ref 136–145)
WBC # BLD AUTO: 8.31 X10(3)/MCL (ref 4.5–11.5)

## 2024-07-09 PROCEDURE — G0378 HOSPITAL OBSERVATION PER HR: HCPCS

## 2024-07-09 PROCEDURE — 85025 COMPLETE CBC W/AUTO DIFF WBC: CPT | Performed by: STUDENT IN AN ORGANIZED HEALTH CARE EDUCATION/TRAINING PROGRAM

## 2024-07-09 PROCEDURE — 25000003 PHARM REV CODE 250: Performed by: INTERNAL MEDICINE

## 2024-07-09 PROCEDURE — 25000003 PHARM REV CODE 250: Performed by: STUDENT IN AN ORGANIZED HEALTH CARE EDUCATION/TRAINING PROGRAM

## 2024-07-09 PROCEDURE — 94761 N-INVAS EAR/PLS OXIMETRY MLT: CPT

## 2024-07-09 PROCEDURE — 80048 BASIC METABOLIC PNL TOTAL CA: CPT | Performed by: STUDENT IN AN ORGANIZED HEALTH CARE EDUCATION/TRAINING PROGRAM

## 2024-07-09 PROCEDURE — 63600175 PHARM REV CODE 636 W HCPCS: Performed by: INTERNAL MEDICINE

## 2024-07-09 PROCEDURE — 96372 THER/PROPH/DIAG INJ SC/IM: CPT | Performed by: INTERNAL MEDICINE

## 2024-07-09 PROCEDURE — 63600175 PHARM REV CODE 636 W HCPCS: Performed by: STUDENT IN AN ORGANIZED HEALTH CARE EDUCATION/TRAINING PROGRAM

## 2024-07-09 PROCEDURE — 96372 THER/PROPH/DIAG INJ SC/IM: CPT | Performed by: STUDENT IN AN ORGANIZED HEALTH CARE EDUCATION/TRAINING PROGRAM

## 2024-07-09 PROCEDURE — 36415 COLL VENOUS BLD VENIPUNCTURE: CPT | Performed by: STUDENT IN AN ORGANIZED HEALTH CARE EDUCATION/TRAINING PROGRAM

## 2024-07-09 RX ORDER — LISINOPRIL 5 MG/1
10 TABLET ORAL DAILY
Status: DISCONTINUED | OUTPATIENT
Start: 2024-07-09 | End: 2024-07-10 | Stop reason: HOSPADM

## 2024-07-09 RX ORDER — INSULIN GLARGINE 100 [IU]/ML
26 INJECTION, SOLUTION SUBCUTANEOUS DAILY
Status: DISCONTINUED | OUTPATIENT
Start: 2024-07-09 | End: 2024-07-09

## 2024-07-09 RX ORDER — GABAPENTIN 300 MG/1
300 CAPSULE ORAL SEE ADMIN INSTRUCTIONS
Status: DISCONTINUED | OUTPATIENT
Start: 2024-07-09 | End: 2024-07-10 | Stop reason: HOSPADM

## 2024-07-09 RX ORDER — NAPROXEN SODIUM 220 MG/1
81 TABLET, FILM COATED ORAL DAILY
Status: DISCONTINUED | OUTPATIENT
Start: 2024-07-09 | End: 2024-07-10 | Stop reason: HOSPADM

## 2024-07-09 RX ORDER — ATORVASTATIN CALCIUM 40 MG/1
80 TABLET, FILM COATED ORAL NIGHTLY
Status: DISCONTINUED | OUTPATIENT
Start: 2024-07-10 | End: 2024-07-10 | Stop reason: HOSPADM

## 2024-07-09 RX ORDER — CARVEDILOL 12.5 MG/1
12.5 TABLET ORAL 2 TIMES DAILY
Status: DISCONTINUED | OUTPATIENT
Start: 2024-07-09 | End: 2024-07-10 | Stop reason: HOSPADM

## 2024-07-09 RX ORDER — ATORVASTATIN CALCIUM 40 MG/1
80 TABLET, FILM COATED ORAL DAILY
Status: DISCONTINUED | OUTPATIENT
Start: 2024-07-09 | End: 2024-07-09

## 2024-07-09 RX ORDER — INSULIN GLARGINE 100 [IU]/ML
26 INJECTION, SOLUTION SUBCUTANEOUS NIGHTLY
Status: DISCONTINUED | OUTPATIENT
Start: 2024-07-09 | End: 2024-07-10 | Stop reason: HOSPADM

## 2024-07-09 RX ORDER — METFORMIN HYDROCHLORIDE 500 MG/1
1000 TABLET ORAL 2 TIMES DAILY WITH MEALS
Status: DISCONTINUED | OUTPATIENT
Start: 2024-07-09 | End: 2024-07-10 | Stop reason: HOSPADM

## 2024-07-09 RX ORDER — INSULIN GLARGINE 100 [IU]/ML
26 INJECTION, SOLUTION SUBCUTANEOUS NIGHTLY
Status: DISCONTINUED | OUTPATIENT
Start: 2024-07-09 | End: 2024-07-09

## 2024-07-09 RX ADMIN — ASPIRIN 81 MG 81 MG: 81 TABLET ORAL at 11:07

## 2024-07-09 RX ADMIN — HYDROCODONE BITARTRATE AND ACETAMINOPHEN 1 TABLET: 5; 325 TABLET ORAL at 10:07

## 2024-07-09 RX ADMIN — INSULIN GLARGINE 26 UNITS: 100 INJECTION, SOLUTION SUBCUTANEOUS at 10:07

## 2024-07-09 RX ADMIN — INSULIN ASPART 1 UNITS: 100 INJECTION, SOLUTION INTRAVENOUS; SUBCUTANEOUS at 08:07

## 2024-07-09 RX ADMIN — CARVEDILOL 12.5 MG: 12.5 TABLET, FILM COATED ORAL at 08:07

## 2024-07-09 RX ADMIN — METFORMIN HYDROCHLORIDE 1000 MG: 500 TABLET, FILM COATED ORAL at 05:07

## 2024-07-09 RX ADMIN — CARVEDILOL 12.5 MG: 12.5 TABLET, FILM COATED ORAL at 11:07

## 2024-07-09 RX ADMIN — CEFTRIAXONE SODIUM 1 G: 1 INJECTION, POWDER, FOR SOLUTION INTRAMUSCULAR; INTRAVENOUS at 10:07

## 2024-07-09 RX ADMIN — ATORVASTATIN CALCIUM 80 MG: 40 TABLET, FILM COATED ORAL at 11:07

## 2024-07-09 RX ADMIN — LISINOPRIL 10 MG: 5 TABLET ORAL at 11:07

## 2024-07-09 NOTE — ED NOTES
7/8/2024  0705: notified by [facility] lab about positive blood cultures for gram negative rods aerobic. Notified Dr Whittington. Attempted to call pt x 4. No answer. Attempted to call pt's daughter Gail, no answer. MD aware and reports he will attempt to call again tonight.

## 2024-07-09 NOTE — ASSESSMENT & PLAN NOTE
Creatine stable for now. BMP reviewed- noted Estimated Creatinine Clearance: 44.8 mL/min (A) (based on SCr of 1.15 mg/dL (H)). according to latest data. Based on current GFR, CKD stage is stage 2 - GFR 60-89.  Monitor UOP and serial BMP and adjust therapy as needed. Renally dose meds. Avoid nephrotoxic medications and procedures.

## 2024-07-09 NOTE — NURSING
Pt. Ciprofloxacin 500mg, Bentyle 20mg, Benadryl 25mg and Zofran 4mg home medications in [facility] where prescribed on 7/7/2024.  Pt. Was compliant with these medication while at home.

## 2024-07-09 NOTE — PLAN OF CARE
07/09/24 1112   Discharge Assessment   Assessment Type Discharge Planning Assessment   Confirmed/corrected address, phone number and insurance Yes   Confirmed Demographics Correct on Facesheet   Source of Information patient   Does patient/caregiver understand observation status Yes   Communicated HUGO with patient/caregiver Date not available/Unable to determine   Reason For Admission Bacteremia   Facility Arrived From: Home   Do you expect to return to your current living situation? Yes   Do you have help at home or someone to help you manage your care at home? Yes   Who are your caregiver(s) and their phone number(s)? Gail Ureña (Daughter)  [phone]   Prior to hospitilization cognitive status: Alert/Oriented   Current cognitive status: Alert/Oriented   Walking or Climbing Stairs Difficulty no   Dressing/Bathing Difficulty no   Equipment Currently Used at Home none   Readmission within 30 days? No   Do you currently have service(s) that help you manage your care at home? No   Do you take prescription medications? Yes   Do you have prescription coverage? Yes   Coverage [employer]   Do you have any problems affording any of your prescribed medications? No   Is the patient taking medications as prescribed? yes   Who is going to help you get home at discharge? Gail Ureña (Daughter)  [phone]   How do you get to doctors appointments? car, drives self;family or friend will provide   Are you on dialysis? No   Do you take coumadin? No   Discharge Plan A Home   DME Needed Upon Discharge  none   Discharge Plan discussed with: Patient   Transition of Care Barriers None   Physical Activity   On average, how many days per week do you engage in moderate to strenuous exercise (like a brisk walk)? 0 days   On average, how many minutes do you engage in exercise at this level? 0 min   Financial Resource Strain   How hard is it for you to pay for the very basics like food, housing, medical care, and heating? Not  hard   Housing Stability   In the last 12 months, was there a time when you were not able to pay the mortgage or rent on time? N   At any time in the past 12 months, were you homeless or living in a shelter (including now)? N   Transportation Needs   Has the lack of transportation kept you from medical appointments, meetings, work or from getting things needed for daily living? No   Food Insecurity   Within the past 12 months, you worried that your food would run out before you got the money to buy more. Never true   Within the past 12 months, the food you bought just didn't last and you didn't have money to get more. Never true   Stress   Do you feel stress - tense, restless, nervous, or anxious, or unable to sleep at night because your mind is troubled all the time - these days? Not at all   Social Isolation   How often do you feel lonely or isolated from those around you?  Never   Alcohol Use   Q1: How often do you have a drink containing alcohol? Never   Q2: How many drinks containing alcohol do you have on a typical day when you are drinking? None   Q3: How often do you have six or more drinks on one occasion? Never   Utilities   In the past 12 months has the electric, gas, oil, or water company threatened to shut off services in your home? No     Currently does not use home health or DME in the home. Does  not feel she will need on discharge. Will defer to Dr. Duffy for further discharge plan.

## 2024-07-09 NOTE — NURSING
Nurses Note -- 4 Eyes      7/8/2024   11:50 PM      Skin assessed during: Admit      [x] No Altered Skin Integrity Present    []Prevention Measures Documented      [] Yes- Altered Skin Integrity Present or Discovered   [] LDA Added if Not in Epic (Describe Wound)   [] New Altered Skin Integrity was Present on Admit and Documented in LDA   [] Wound Image Taken    Wound Care Consulted? No    Attending Nurse:  Echo Camacho RN     Second RN/Staff Member:   Awa Hanley RN

## 2024-07-09 NOTE — PLAN OF CARE
Problem: Adult Inpatient Plan of Care  Goal: Plan of Care Review  Outcome: Progressing  Goal: Patient-Specific Goal (Individualized)  Outcome: Progressing  Goal: Absence of Hospital-Acquired Illness or Injury  Outcome: Progressing  Goal: Optimal Comfort and Wellbeing  Outcome: Progressing  Goal: Readiness for Transition of Care  Outcome: Progressing     Problem: Diabetes Comorbidity  Goal: Blood Glucose Level Within Targeted Range  Outcome: Progressing     Problem: Infection  Goal: Absence of Infection Signs and Symptoms  Outcome: Progressing     Problem: Comorbidity Management  Goal: Blood Pressure in Desired Range  Outcome: Progressing     Problem: Pain Chronic (Persistent)  Goal: Optimal Pain Control and Function  Outcome: Progressing     Problem: Fall Injury Risk  Goal: Absence of Fall and Fall-Related Injury  Outcome: Progressing

## 2024-07-09 NOTE — ASSESSMENT & PLAN NOTE
Chronic, controlled. Latest blood pressure and vitals reviewed-     Temp:  [97.6 °F (36.4 °C)-98.2 °F (36.8 °C)]   Pulse:  [72-88]   Resp:  [18-20]   BP: (116-157)/(74-99)   SpO2:  [95 %-98 %] .   Home meds for hypertension were reviewed and noted below.   Hypertension Medications               carvediloL (COREG) 12.5 MG tablet Take 1 tablet (12.5 mg total) by mouth 2 (two) times daily.    lisinopriL 10 MG tablet Take 1 tablet (10 mg total) by mouth once daily.            While in the hospital, will manage blood pressure as follows; Continue home antihypertensive regimen    Will utilize p.r.n. blood pressure medication only if patient's blood pressure greater than 140/90 and she develops symptoms such as worsening chest pain or shortness of breath.

## 2024-07-09 NOTE — TELEPHONE ENCOUNTER
Notified by Valerie MICHELLE of positive blood cultures with GNR.  Patient diagnosed with a UTI yesterday and placed on antibiotics.  Plan will be to call patient back to the emergency room for bacteremia rule out.  Attempted to call all numbers on file with no answer. Will attempt again later.    Chano Whittington MD

## 2024-07-09 NOTE — ED PROVIDER NOTES
Encounter Date: 7/8/2024       History     Chief Complaint   Patient presents with    Dysuria     Pt was seen in ER yesterday , + blood cultures resulted to day with + gram -rods      Patient is a 73-year-old  female with a past medical history prediabetes, hyperlipidemia, hypertension and CKD who presented to the ER today for abnormal blood cultures.  She was seen here yesterday for chills, nausea, vomiting and diarrhea which he states that is subsided today.  She was diagnosed with a UTI and dehydration.  Blood cultures were drawn at that time and noted some both positive for gram-negative rods.  We called patient back to the ER to come back for admission and IV antibiotics.  She states she was not had chills and has been compliant with the antibiotics prescribed.  She denies any nausea, vomiting, abdominal pain, chest pain, shortness of breath, dysuria today.      Review of patient's allergies indicates:  No Known Allergies  Past Medical History:   Diagnosis Date    CKD (chronic kidney disease) stage 2, GFR 60-89 ml/min     DM (diabetes mellitus)     HLD (hyperlipidemia)     HTN (hypertension)      Past Surgical History:   Procedure Laterality Date    ANKLE FUSION Right     CATARACT EXTRACTION Bilateral     CHOLECYSTECTOMY      COLONOSCOPY  11/30/2015    HYSTERECTOMY      NECK SURGERY      stint      Right kidney     Family History   Problem Relation Name Age of Onset    Diabetes Mother      Stroke Mother      Hypertension Father      Diabetes Father      Heart disease Father      Cancer Brother      Diabetes Brother       Social History     Tobacco Use    Smoking status: Never     Passive exposure: Never    Smokeless tobacco: Never   Substance Use Topics    Alcohol use: Never    Drug use: Never     Review of Systems   Constitutional:  Negative for chills, fatigue and fever.   HENT:  Negative for congestion, sore throat and trouble swallowing.    Eyes:  Negative for pain and visual disturbance.    Respiratory:  Negative for cough, shortness of breath and wheezing.    Cardiovascular:  Negative for chest pain and palpitations.   Gastrointestinal:  Negative for abdominal pain, blood in stool, constipation, diarrhea, nausea and vomiting.   Genitourinary:  Negative for dysuria and hematuria.   Musculoskeletal:  Negative for back pain and myalgias.   Skin:  Negative for rash and wound.   Neurological:  Negative for seizures, syncope and headaches.   Psychiatric/Behavioral:  Negative for confusion. The patient is not nervous/anxious.        Physical Exam     Initial Vitals [07/08/24 2028]   BP Pulse Resp Temp SpO2   (!) 157/99 88 18 98 °F (36.7 °C) 96 %      MAP       --         Physical Exam    Nursing note and vitals reviewed.  Constitutional: She appears well-developed and well-nourished. She is not diaphoretic. No distress.   HENT:   Head: Normocephalic.   Right Ear: External ear normal.   Left Ear: External ear normal.   Nose: Nose normal.   Eyes: Conjunctivae and EOM are normal. Right eye exhibits no discharge. Left eye exhibits no discharge. No scleral icterus.   Neck:   Normal range of motion.  Cardiovascular:  Normal rate, regular rhythm and normal heart sounds.     Exam reveals no gallop and no friction rub.       No murmur heard.  Pulmonary/Chest: Breath sounds normal. No stridor. No respiratory distress. She has no wheezes. She has no rhonchi. She has no rales.   Abdominal: Abdomen is soft. She exhibits no distension. There is no abdominal tenderness. There is no rebound and no guarding.   Musculoskeletal:         General: Normal range of motion.      Cervical back: Normal range of motion.     Neurological: She is alert.   Skin: Skin is warm. No rash noted. No erythema.   Psychiatric: She has a normal mood and affect. Her behavior is normal.         ED Course   Procedures  Labs Reviewed   COMPREHENSIVE METABOLIC PANEL - Abnormal; Notable for the following components:       Result Value    Glucose 208  (*)     Creatinine 1.30 (*)     All other components within normal limits   CBC WITH DIFFERENTIAL - Abnormal; Notable for the following components:    RBC 3.94 (*)     Hgb 10.8 (*)     Hct 34.2 (*)     MCHC 31.6 (*)     All other components within normal limits   BLOOD CULTURE OLG   BLOOD CULTURE OLG   CBC W/ AUTO DIFFERENTIAL    Narrative:     The following orders were created for panel order CBC Auto Differential.  Procedure                               Abnormality         Status                     ---------                               -----------         ------                     CBC with Differential[[id number]]       Abnormal            Final result                 Please view results for these tests on the individual orders.          Imaging Results    None          Medications   cefTRIAXone (Rocephin) 1 g in D5W 100 mL IVPB (MB+) (1 g Intravenous New Bag 7/8/24 2120)     Medical Decision Making  Differentials: Bacteremia, UTI rashes she was   History in his the patient   73-year-old well-appearing female with stable vital signs presents asymptomatically for 2 positive blood cultures with Gram-negative rods.  Presumed source to be the urine.  Basic labs were obtained with no leukocytosis appreciated.  She was hemodynamically stable.  Repeat blood cultures drawn.  Rocephin given after blood cultures.  Discussed with hospitalist  who accepted for observation admission.    Amount and/or Complexity of Data Reviewed  Labs: ordered. Decision-making details documented in ED Course.                                      Clinical Impression:  Final diagnoses:  [R78.81] Bacteremia (Primary)          ED Disposition Condition    Observation Stable                Chano Whittington MD  07/08/24 7234

## 2024-07-09 NOTE — SUBJECTIVE & OBJECTIVE
Past Medical History:   Diagnosis Date    CKD (chronic kidney disease) stage 2, GFR 60-89 ml/min     DM (diabetes mellitus)     HLD (hyperlipidemia)     HTN (hypertension)        Past Surgical History:   Procedure Laterality Date    ANKLE FUSION Right     CATARACT EXTRACTION Bilateral     CHOLECYSTECTOMY      COLONOSCOPY  11/30/2015    HYSTERECTOMY      NECK SURGERY      stint      Right kidney       Review of patient's allergies indicates:  No Known Allergies    No current facility-administered medications on file prior to encounter.     Current Outpatient Medications on File Prior to Encounter   Medication Sig    acetaminophen (TYLENOL) 500 MG tablet Take 500 mg by mouth every 6 (six) hours as needed for Pain.    aspirin 81 MG Chew Take 81 mg by mouth once daily at 6am.    atorvastatin (LIPITOR) 80 MG tablet Take 1 tablet (80 mg total) by mouth once daily.    carvediloL (COREG) 12.5 MG tablet Take 1 tablet (12.5 mg total) by mouth 2 (two) times daily.    clobetasol 0.05% (TEMOVATE) 0.05 % Oint Apply nightly x1 month, then every other night x1 month, then 2x/week going forward    gabapentin (NEURONTIN) 300 MG capsule Take 1 capsule (300 mg total) by mouth As instructed (back pain). 300mg in the morning and 300mg in the afternoon, take 600mg at night    HYDROcodone-acetaminophen (NORCO) 5-325 mg per tablet Take 1 tablet by mouth 2 (two) times daily as needed for Pain.    LANTUS SOLOSTAR U-100 INSULIN glargine 100 units/mL SubQ pen Inject 26 Units into the skin once daily.    LIDOcaine (LIDODERM) 5 % Place 1 patch onto the skin every 24 hours. Remove & Discard patch within 12 hours or as directed by MD    lisinopriL 10 MG tablet Take 1 tablet (10 mg total) by mouth once daily.    loratadine (CLARITIN) 10 mg tablet Take 10 mg by mouth once daily.    metFORMIN (GLUCOPHAGE) 1000 MG tablet Take 1 tablet (1,000 mg total) by mouth 2 (two) times daily with meals.    multivitamin capsule Take 1 capsule by mouth once daily.  "   vitamin D (VITAMIN D3) 1000 units Tab Take 1,000 Units by mouth once daily.    blood sugar diagnostic Strp 1 strip by Misc.(Non-Drug; Combo Route) route 2 (two) times a day. To check BG 2 times daily (AM morning fasting, and 2 hours after dinner), to use with insurance preferred meter    ciprofloxacin HCl (CIPRO) 500 MG tablet Take 1 tablet (500 mg total) by mouth 2 (two) times daily. for 10 days    cyanocobalamin, vitamin B-12, (VITAMIN B-12) 50 mcg tablet Take 50 mcg by mouth once daily.    dicyclomine (BENTYL) 20 mg tablet Take 1 tablet (20 mg total) by mouth 2 (two) times daily as needed (diarrhea).    diphenhydrAMINE (BENADRYL) 25 mg capsule Take 1 capsule (25 mg total) by mouth every 12 (twelve) hours as needed for Itching or Allergies.    lancets (LANCETS,THIN) Misc 1 Needle by Misc.(Non-Drug; Combo Route) route 2 (two) times a day.    ondansetron (ZOFRAN) 4 MG tablet Take 1 tablet (4 mg total) by mouth every 6 (six) hours.    pen needle, diabetic 29 gauge x 1/2" Ndle   Insulin pen needles, See Instructions, Inuslin pen needles for once a day Lantus injection  E11.65, # 90 EA, 3 Refill(s), Pharmacy: [id number], 162, cm, Height/Length Dosing, 12/10/21 9:34:00 CST, 90.2, kg, Weight Dosing, 12/10/21 9:34:00 CST    [DISCONTINUED] mirtazapine (REMERON) 15 MG tablet Take 15 mg by mouth every evening.     Family History       Problem Relation (Age of Onset)    Cancer Brother    Diabetes Mother, Father, Brother    Heart disease Father    Hypertension Father    Stroke Mother          Tobacco Use    Smoking status: Never     Passive exposure: Never    Smokeless tobacco: Never   Substance and Sexual Activity    Alcohol use: Never    Drug use: Never    Sexual activity: Not Currently     Partners: Male     Review of Systems   Constitutional: Negative.    HENT: Negative.     Eyes: Negative.    Respiratory: Negative.     Cardiovascular: Negative.    Gastrointestinal: Negative.    Endocrine: Negative.  "   Genitourinary: Negative.    Musculoskeletal: Negative.    Skin: Negative.    Allergic/Immunologic: Negative.    Neurological: Negative.    Hematological: Negative.    Psychiatric/Behavioral: Negative.       Objective:     Vital Signs (Most Recent):  Temp: 98.1 °F (36.7 °C) (07/09/24 0756)  Pulse: 74 (07/09/24 0756)  Resp: 18 (07/09/24 1024)  BP: (!) 148/82 (07/09/24 0756)  SpO2: 96 % (07/09/24 0756) Vital Signs (24h Range):  Temp:  [97.6 °F (36.4 °C)-98.2 °F (36.8 °C)] 98.1 °F (36.7 °C)  Pulse:  [72-88] 74  Resp:  [18-20] 18  SpO2:  [95 %-98 %] 96 %  BP: (116-157)/(74-99) 148/82     Weight: 76 kg (167 lb 8.8 oz)  Body mass index is 28.76 kg/m².     Physical Exam  Constitutional:       Appearance: Normal appearance. She is normal weight.   HENT:      Head: Normocephalic and atraumatic.      Nose: Nose normal.      Mouth/Throat:      Mouth: Mucous membranes are moist.      Pharynx: Oropharynx is clear.   Eyes:      Extraocular Movements: Extraocular movements intact and EOM normal.      Conjunctiva/sclera: Conjunctivae normal.      Pupils: Pupils are equal, round, and reactive to light.   Cardiovascular:      Rate and Rhythm: Normal rate and regular rhythm.      Pulses: Normal pulses.      Heart sounds: Normal heart sounds.   Pulmonary:      Effort: Pulmonary effort is normal.      Breath sounds: Normal breath sounds.   Abdominal:      General: Bowel sounds are normal.      Palpations: Abdomen is soft.   Musculoskeletal:         General: Normal range of motion.      Cervical back: Normal range of motion and neck supple.   Skin:     General: Skin is warm and dry.      Capillary Refill: Capillary refill takes 2 to 3 seconds.   Neurological:      General: No focal deficit present.      Mental Status: She is alert and oriented to person, place, and time. Mental status is at baseline.   Psychiatric:         Mood and Affect: Mood normal.         Behavior: Behavior normal.         Thought Content: Thought content normal.    "      Judgment: Judgment normal.              CRANIAL NERVES     CN I  cranial nerve I not tested    CN II   Visual fields full to confrontation.     CN III, IV, VI   Pupils are equal, round, and reactive to light.  Extraocular motions are normal.   CN III: no CN III palsy  CN VI: no CN VI palsy    CN V   Facial sensation intact.     CN VII   Facial expression full, symmetric.     CN VIII   CN VIII normal.     CN IX, X   CN IX normal.   CN X normal.     CN XI   CN XI normal.     CN XII   CN XII normal.        Significant Labs: All pertinent labs within the past 24 hours have been reviewed.  BMP:   Recent Labs   Lab 07/09/24  0400      K 4.0   *   CO2 25   BUN 18.0   CREATININE 1.15*   CALCIUM 8.7     CBC:   Recent Labs   Lab 07/07/24 1402 07/08/24 2039 07/09/24  0400   WBC 9.39 9.22 8.31   HGB 12.1 10.8* 10.1*   HCT 38.7 34.2* 32.8*    180 174     CMP:   Recent Labs   Lab 07/07/24 1402 07/08/24 2039 07/09/24  0400    141 144   K 4.2 4.0 4.0    107 111*   CO2 25 24 25   BUN 21.0* 20.0 18.0   CREATININE 1.33* 1.30* 1.15*   CALCIUM 9.0 9.0 8.7   ALBUMIN 3.8 3.5  --    BILITOT 0.9 0.5  --    ALKPHOS 72 60  --    AST 15 18  --    ALT 15 16  --      Magnesium: No results for input(s): "MG" in the last 48 hours.  Urine Culture:   Recent Labs   Lab 07/07/24  1450   LABURIN >/= 100,000 colonies/ml Gram-negative Rods*     Urine Studies:   Recent Labs   Lab 07/07/24  1450   COLORU Yellow   APPEARANCEUA SL CLOUDY*   PHUR 6.0   PROTEINUA 1+*   GLUCUA Trace*   BILIRUBINUA Negative   OCCULTUA Trace*   NITRITE Positive*   UROBILINOGEN 0.2   LEUKOCYTESUR Trace*   RBCUA 0-5   WBCUA 21-50*   BACTERIA Many*       Significant Imaging: I have reviewed all pertinent imaging results/findings within the past 24 hours.  "

## 2024-07-09 NOTE — H&P
[facility] Surgical Unit  [facility]  History & Physical    Patient Name: Marnie Briggs  MRN: [id number]  Patient Class: OP- Observation  Admission Date: 7/8/2024  Attending Physician: Yosef Duffy MD   Primary Care Provider: Kenzie Blackman MD         Patient information was obtained from patient and ER records.     Subjective:     Principal Problem:Bacteremia    Chief Complaint:   Chief Complaint   Patient presents with    Dysuria     Pt was seen in ER yesterday , + blood cultures resulted to day with + gram -rods         HPI: 74 yo female presents to ED due to positive blood cultures from the day before.  She had presented originally due to chills with N/V/D.  She was found to have a UTI and was sent home.  Blood cultures are growing GNR's so she was called back to the ED for admission.  No WBC elevation and no fever.  The patient states that she feels back to normal.  Currently no fever/chills.  No N/V/D/C.  No chest pain/SOB.  No dysuria.  She will be placed on IV antibiotics and admitted to observation until cultures result.    Past Medical History:   Diagnosis Date    CKD (chronic kidney disease) stage 2, GFR 60-89 ml/min     DM (diabetes mellitus)     HLD (hyperlipidemia)     HTN (hypertension)        Past Surgical History:   Procedure Laterality Date    ANKLE FUSION Right     CATARACT EXTRACTION Bilateral     CHOLECYSTECTOMY      COLONOSCOPY  11/30/2015    HYSTERECTOMY      NECK SURGERY      stint      Right kidney       Review of patient's allergies indicates:  No Known Allergies    No current facility-administered medications on file prior to encounter.     Current Outpatient Medications on File Prior to Encounter   Medication Sig    acetaminophen (TYLENOL) 500 MG tablet Take 500 mg by mouth every 6 (six) hours as needed for Pain.    aspirin 81 MG Chew Take 81 mg by mouth once daily at 6am.    atorvastatin (LIPITOR) 80 MG tablet Take 1 tablet (80 mg total) by mouth  once daily.    carvediloL (COREG) 12.5 MG tablet Take 1 tablet (12.5 mg total) by mouth 2 (two) times daily.    clobetasol 0.05% (TEMOVATE) 0.05 % Oint Apply nightly x1 month, then every other night x1 month, then 2x/week going forward    gabapentin (NEURONTIN) 300 MG capsule Take 1 capsule (300 mg total) by mouth As instructed (back pain). 300mg in the morning and 300mg in the afternoon, take 600mg at night    HYDROcodone-acetaminophen (NORCO) 5-325 mg per tablet Take 1 tablet by mouth 2 (two) times daily as needed for Pain.    LANTUS SOLOSTAR U-100 INSULIN glargine 100 units/mL SubQ pen Inject 26 Units into the skin once daily.    LIDOcaine (LIDODERM) 5 % Place 1 patch onto the skin every 24 hours. Remove & Discard patch within 12 hours or as directed by MD    lisinopriL 10 MG tablet Take 1 tablet (10 mg total) by mouth once daily.    loratadine (CLARITIN) 10 mg tablet Take 10 mg by mouth once daily.    metFORMIN (GLUCOPHAGE) 1000 MG tablet Take 1 tablet (1,000 mg total) by mouth 2 (two) times daily with meals.    multivitamin capsule Take 1 capsule by mouth once daily.    vitamin D (VITAMIN D3) 1000 units Tab Take 1,000 Units by mouth once daily.    blood sugar diagnostic Strp 1 strip by Misc.(Non-Drug; Combo Route) route 2 (two) times a day. To check BG 2 times daily (AM morning fasting, and 2 hours after dinner), to use with insurance preferred meter    ciprofloxacin HCl (CIPRO) 500 MG tablet Take 1 tablet (500 mg total) by mouth 2 (two) times daily. for 10 days    cyanocobalamin, vitamin B-12, (VITAMIN B-12) 50 mcg tablet Take 50 mcg by mouth once daily.    dicyclomine (BENTYL) 20 mg tablet Take 1 tablet (20 mg total) by mouth 2 (two) times daily as needed (diarrhea).    diphenhydrAMINE (BENADRYL) 25 mg capsule Take 1 capsule (25 mg total) by mouth every 12 (twelve) hours as needed for Itching or Allergies.    lancets (LANCETS,THIN) Misc 1 Needle by Misc.(Non-Drug; Combo Route) route 2 (two) times a day.     "ondansetron (ZOFRAN) 4 MG tablet Take 1 tablet (4 mg total) by mouth every 6 (six) hours.    pen needle, diabetic 29 gauge x 1/2" Ndle   Insulin pen needles, See Instructions, Inuslin pen needles for once a day Lantus injection  E11.65, # 90 EA, 3 Refill(s), Pharmacy: [id number] [id number], 162, cm, Height/Length Dosing, 12/10/21 9:34:00 CST, 90.2, kg, Weight Dosing, 12/10/21 9:34:00 CST    [DISCONTINUED] mirtazapine (REMERON) 15 MG tablet Take 15 mg by mouth every evening.     Family History       Problem Relation (Age of Onset)    Cancer Brother    Diabetes Mother, Father, Brother    Heart disease Father    Hypertension Father    Stroke Mother          Tobacco Use    Smoking status: Never     Passive exposure: Never    Smokeless tobacco: Never   Substance and Sexual Activity    Alcohol use: Never    Drug use: Never    Sexual activity: Not Currently     Partners: Male     Review of Systems   Constitutional: Negative.    HENT: Negative.     Eyes: Negative.    Respiratory: Negative.     Cardiovascular: Negative.    Gastrointestinal: Negative.    Endocrine: Negative.    Genitourinary: Negative.    Musculoskeletal: Negative.    Skin: Negative.    Allergic/Immunologic: Negative.    Neurological: Negative.    Hematological: Negative.    Psychiatric/Behavioral: Negative.       Objective:     Vital Signs (Most Recent):  Temp: 98.1 °F (36.7 °C) (07/09/24 0756)  Pulse: 74 (07/09/24 0756)  Resp: 18 (07/09/24 1024)  BP: (!) 148/82 (07/09/24 0756)  SpO2: 96 % (07/09/24 0756) Vital Signs (24h Range):  Temp:  [97.6 °F (36.4 °C)-98.2 °F (36.8 °C)] 98.1 °F (36.7 °C)  Pulse:  [72-88] 74  Resp:  [18-20] 18  SpO2:  [95 %-98 %] 96 %  BP: (116-157)/(74-99) 148/82     Weight: 76 kg (167 lb 8.8 oz)  Body mass index is 28.76 kg/m².     Physical Exam  Constitutional:       Appearance: Normal appearance. She is normal weight.   HENT:      Head: Normocephalic and atraumatic.      Nose: Nose normal.      Mouth/Throat:      Mouth: Mucous " membranes are moist.      Pharynx: Oropharynx is clear.   Eyes:      Extraocular Movements: Extraocular movements intact and EOM normal.      Conjunctiva/sclera: Conjunctivae normal.      Pupils: Pupils are equal, round, and reactive to light.   Cardiovascular:      Rate and Rhythm: Normal rate and regular rhythm.      Pulses: Normal pulses.      Heart sounds: Normal heart sounds.   Pulmonary:      Effort: Pulmonary effort is normal.      Breath sounds: Normal breath sounds.   Abdominal:      General: Bowel sounds are normal.      Palpations: Abdomen is soft.   Musculoskeletal:         General: Normal range of motion.      Cervical back: Normal range of motion and neck supple.   Skin:     General: Skin is warm and dry.      Capillary Refill: Capillary refill takes 2 to 3 seconds.   Neurological:      General: No focal deficit present.      Mental Status: She is alert and oriented to person, place, and time. Mental status is at baseline.   Psychiatric:         Mood and Affect: Mood normal.         Behavior: Behavior normal.         Thought Content: Thought content normal.         Judgment: Judgment normal.              CRANIAL NERVES     CN I  cranial nerve I not tested    CN II   Visual fields full to confrontation.     CN III, IV, VI   Pupils are equal, round, and reactive to light.  Extraocular motions are normal.   CN III: no CN III palsy  CN VI: no CN VI palsy    CN V   Facial sensation intact.     CN VII   Facial expression full, symmetric.     CN VIII   CN VIII normal.     CN IX, X   CN IX normal.   CN X normal.     CN XI   CN XI normal.     CN XII   CN XII normal.        Significant Labs: All pertinent labs within the past 24 hours have been reviewed.  BMP:   Recent Labs   Lab 07/09/24  0400      K 4.0   *   CO2 25   BUN 18.0   CREATININE 1.15*   CALCIUM 8.7     CBC:   Recent Labs   Lab 07/07/24  1402 07/08/24  2039 07/09/24  0400   WBC 9.39 9.22 8.31   HGB 12.1 10.8* 10.1*   HCT 38.7 34.2* 32.8*  "   180 174     CMP:   Recent Labs   Lab 07/07/24  1402 07/08/24  2039 07/09/24  0400    141 144   K 4.2 4.0 4.0    107 111*   CO2 25 24 25   BUN 21.0* 20.0 18.0   CREATININE 1.33* 1.30* 1.15*   CALCIUM 9.0 9.0 8.7   ALBUMIN 3.8 3.5  --    BILITOT 0.9 0.5  --    ALKPHOS 72 60  --    AST 15 18  --    ALT 15 16  --      Magnesium: No results for input(s): "MG" in the last 48 hours.  Urine Culture:   Recent Labs   Lab 07/07/24  1450   LABURIN >/= 100,000 colonies/ml Gram-negative Rods*     Urine Studies:   Recent Labs   Lab 07/07/24  1450   COLORU Yellow   APPEARANCEUA SL CLOUDY*   PHUR 6.0   PROTEINUA 1+*   GLUCUA Trace*   BILIRUBINUA Negative   OCCULTUA Trace*   NITRITE Positive*   UROBILINOGEN 0.2   LEUKOCYTESUR Trace*   RBCUA 0-5   WBCUA 21-50*   BACTERIA Many*       Significant Imaging: I have reviewed all pertinent imaging results/findings within the past 24 hours.  Assessment/Plan:     * Bacteremia  Admit to observation  IV Rocephin  Follow labs  OOB  Cultures pending      Essential hypertension  Chronic, controlled. Latest blood pressure and vitals reviewed-     Temp:  [97.6 °F (36.4 °C)-98.2 °F (36.8 °C)]   Pulse:  [72-88]   Resp:  [18-20]   BP: (116-157)/(74-99)   SpO2:  [95 %-98 %] .   Home meds for hypertension were reviewed and noted below.   Hypertension Medications               carvediloL (COREG) 12.5 MG tablet Take 1 tablet (12.5 mg total) by mouth 2 (two) times daily.    lisinopriL 10 MG tablet Take 1 tablet (10 mg total) by mouth once daily.            While in the hospital, will manage blood pressure as follows; Continue home antihypertensive regimen    Will utilize p.r.n. blood pressure medication only if patient's blood pressure greater than 140/90 and she develops symptoms such as worsening chest pain or shortness of breath.    Type 2 diabetes mellitus with stage 2 chronic kidney disease, with long-term current use of insulin  Creatine stable for now. BMP reviewed- noted " Estimated Creatinine Clearance: 44.8 mL/min (A) (based on SCr of 1.15 mg/dL (H)). according to latest data. Based on current GFR, CKD stage is stage 2 - GFR 60-89.  Monitor UOP and serial BMP and adjust therapy as needed. Renally dose meds. Avoid nephrotoxic medications and procedures.      VTE Risk Mitigation (From admission, onward)           Ordered     IP VTE HIGH RISK PATIENT  Once         07/08/24 2251     Place sequential compression device  Until discontinued         07/08/24 2251                       On 07/09/2024, patient should be placed in hospital observation services under my care.         Patient screened for food insecurity, housing instability, transportation needs, utility difficulties, and interpersonal safety.   No needs at this time       Yosef Duffy MD  Department of Hospital Medicine  [facility]

## 2024-07-09 NOTE — HPI
74 yo female presents to ED due to positive blood cultures from the day before.  She had presented originally due to chills with N/V/D.  She was found to have a UTI and was sent home.  Blood cultures are growing GNR's so she was called back to the ED for admission.  No WBC elevation and no fever.  The patient states that she feels back to normal.  Currently no fever/chills.  No N/V/D/C.  No chest pain/SOB.  No dysuria.  She will be placed on IV antibiotics and admitted to observation until cultures result.

## 2024-07-10 VITALS
BODY MASS INDEX: 28.6 KG/M2 | DIASTOLIC BLOOD PRESSURE: 93 MMHG | HEIGHT: 64 IN | RESPIRATION RATE: 18 BRPM | TEMPERATURE: 99 F | OXYGEN SATURATION: 96 % | SYSTOLIC BLOOD PRESSURE: 164 MMHG | WEIGHT: 167.56 LBS | HEART RATE: 69 BPM

## 2024-07-10 LAB
BACTERIA BLD CULT: ABNORMAL
BACTERIA BLD CULT: ABNORMAL
GRAM STN SPEC: ABNORMAL
POCT GLUCOSE: 165 MG/DL (ref 70–110)
POCT GLUCOSE: 216 MG/DL (ref 70–110)

## 2024-07-10 PROCEDURE — 94760 N-INVAS EAR/PLS OXIMETRY 1: CPT

## 2024-07-10 PROCEDURE — 63600175 PHARM REV CODE 636 W HCPCS: Performed by: INTERNAL MEDICINE

## 2024-07-10 PROCEDURE — 99900031 HC PATIENT EDUCATION (STAT)

## 2024-07-10 PROCEDURE — 63600175 PHARM REV CODE 636 W HCPCS: Performed by: STUDENT IN AN ORGANIZED HEALTH CARE EDUCATION/TRAINING PROGRAM

## 2024-07-10 PROCEDURE — 11000001 HC ACUTE MED/SURG PRIVATE ROOM

## 2024-07-10 PROCEDURE — 25000003 PHARM REV CODE 250: Performed by: INTERNAL MEDICINE

## 2024-07-10 RX ORDER — ONDANSETRON HYDROCHLORIDE 2 MG/ML
4 INJECTION, SOLUTION INTRAVENOUS EVERY 6 HOURS PRN
Status: DISCONTINUED | OUTPATIENT
Start: 2024-07-10 | End: 2024-07-10 | Stop reason: HOSPADM

## 2024-07-10 RX ADMIN — LISINOPRIL 10 MG: 5 TABLET ORAL at 09:07

## 2024-07-10 RX ADMIN — CARVEDILOL 12.5 MG: 12.5 TABLET, FILM COATED ORAL at 09:07

## 2024-07-10 RX ADMIN — INSULIN ASPART 2 UNITS: 100 INJECTION, SOLUTION INTRAVENOUS; SUBCUTANEOUS at 11:07

## 2024-07-10 RX ADMIN — ONDANSETRON 4 MG: 2 INJECTION INTRAMUSCULAR; INTRAVENOUS at 06:07

## 2024-07-10 RX ADMIN — HYDROCODONE BITARTRATE AND ACETAMINOPHEN 1 TABLET: 5; 325 TABLET ORAL at 12:07

## 2024-07-10 RX ADMIN — ASPIRIN 81 MG 81 MG: 81 TABLET ORAL at 09:07

## 2024-07-10 NOTE — PROGRESS NOTES
[facility] Medical Surgical Unit  [facility] Medicine  Progress Note    Patient Name: Marnie Briggs  MRN: [id number]  Patient Class: IP- Inpatient   Admission Date: 7/8/2024  Length of Stay: 0 days  Attending Physician: Yosef Duffy MD  Primary Care Provider: Kenzie Blackman MD        Subjective:     Principal Problem:Bacteremia        HPI:  72 yo female presents to ED due to positive blood cultures from the day before.  She had presented originally due to chills with N/V/D.  She was found to have a UTI and was sent home.  Blood cultures are growing GNR's so she was called back to the ED for admission.  No WBC elevation and no fever.  The patient states that she feels back to normal.  Currently no fever/chills.  No N/V/D/C.  No chest pain/SOB.  No dysuria.  She will be placed on IV antibiotics and admitted to observation until cultures result.    Overview/Hospital Course:  7/10/24-Cultures resulted with E.coli in blood and urine.  Patient had some N/V this morning.  Will monitor.  Currently on IV Rocephin.    Interval History:     Review of Systems   Constitutional: Negative.    HENT: Negative.     Eyes: Negative.    Respiratory: Negative.     Cardiovascular: Negative.    Gastrointestinal:  Positive for nausea and vomiting.   Endocrine: Negative.    Genitourinary: Negative.    Musculoskeletal: Negative.    Skin: Negative.    Allergic/Immunologic: Negative.    Neurological: Negative.    Hematological: Negative.    Psychiatric/Behavioral: Negative.       Objective:     Vital Signs (Most Recent):  Temp: 98.5 °F (36.9 °C) (07/10/24 0800)  Pulse: 71 (07/10/24 0800)  Resp: 18 (07/10/24 0800)  BP: (!) 149/86 (07/10/24 0800)  SpO2: 97 % (07/10/24 0800) Vital Signs (24h Range):  Temp:  [98.5 °F (36.9 °C)-98.6 °F (37 °C)] 98.5 °F (36.9 °C)  Pulse:  [69-80] 71  Resp:  [18] 18  SpO2:  [95 %-97 %] 97 %  BP: (131-153)/(80-89) 149/86     Weight: 76 kg (167 lb 8.8 oz)  Body mass index is 28.76 kg/m².  No intake  or output data in the 24 hours ending 07/10/24 1201      Physical Exam  Constitutional:       Appearance: Normal appearance. She is normal weight.   HENT:      Head: Normocephalic and atraumatic.      Nose: Nose normal.      Mouth/Throat:      Mouth: Mucous membranes are moist.      Pharynx: Oropharynx is clear.   Eyes:      Extraocular Movements: Extraocular movements intact.      Conjunctiva/sclera: Conjunctivae normal.      Pupils: Pupils are equal, round, and reactive to light.   Cardiovascular:      Rate and Rhythm: Normal rate and regular rhythm.      Pulses: Normal pulses.      Heart sounds: Normal heart sounds.   Pulmonary:      Effort: Pulmonary effort is normal.      Breath sounds: Normal breath sounds.   Abdominal:      General: Bowel sounds are normal.      Palpations: Abdomen is soft.   Musculoskeletal:         General: Normal range of motion.      Cervical back: Normal range of motion and neck supple.   Skin:     General: Skin is warm and dry.      Capillary Refill: Capillary refill takes 2 to 3 seconds.   Neurological:      General: No focal deficit present.      Mental Status: She is alert and oriented to person, place, and time. Mental status is at baseline.   Psychiatric:         Mood and Affect: Mood normal.         Behavior: Behavior normal.         Thought Content: Thought content normal.         Judgment: Judgment normal.             Significant Labs: All pertinent labs within the past 24 hours have been reviewed.    Significant Imaging: I have reviewed all pertinent imaging results/findings within the past 24 hours.    Assessment/Plan:      * Bacteremia  Admit to observation  IV Rocephin  Follow labs  OOB  Cultures pending      Essential hypertension  Chronic, controlled. Latest blood pressure and vitals reviewed-     Temp:  [97.6 °F (36.4 °C)-98.2 °F (36.8 °C)]   Pulse:  [72-88]   Resp:  [18-20]   BP: (116-157)/(74-99)   SpO2:  [95 %-98 %] .   Home meds for hypertension were reviewed and  noted below.   Hypertension Medications               carvediloL (COREG) 12.5 MG tablet Take 1 tablet (12.5 mg total) by mouth 2 (two) times daily.    lisinopriL 10 MG tablet Take 1 tablet (10 mg total) by mouth once daily.            While in the hospital, will manage blood pressure as follows; Continue home antihypertensive regimen    Will utilize p.r.n. blood pressure medication only if patient's blood pressure greater than 140/90 and she develops symptoms such as worsening chest pain or shortness of breath.    Type 2 diabetes mellitus with stage 2 chronic kidney disease, with long-term current use of insulin  Creatine stable for now. BMP reviewed- noted Estimated Creatinine Clearance: 44.8 mL/min (A) (based on SCr of 1.15 mg/dL (H)). according to latest data. Based on current GFR, CKD stage is stage 2 - GFR 60-89.  Monitor UOP and serial BMP and adjust therapy as needed. Renally dose meds. Avoid nephrotoxic medications and procedures.      VTE Risk Mitigation (From admission, onward)           Ordered     IP VTE HIGH RISK PATIENT  Once         07/08/24 2251     Place sequential compression device  Until discontinued         07/08/24 2251                  IV Rocephin  Check labs in am  Zofran for N/V  OOB  D/C soon  Discharge Planning   HUGO:      Code Status: Full Code   Is the patient medically ready for discharge?:     Reason for patient still in hospital (select all that apply): Patient trending condition, Laboratory test, and Treatment  Discharge Plan A: Home                  Yosef Duffy MD  Department of Hospital Medicine   [facility]

## 2024-07-10 NOTE — DISCHARGE SUMMARY
[facility]  Hospital Medicine  Discharge Summary      Patient Name: Marnie Briggs  MRN: [id number]  NAM: [id number]  Patient Class: IP- Inpatient  Admission Date: 7/8/2024  Hospital Length of Stay: 0 days  Discharge Date and Time:  07/10/2024 2:25 PM  Attending Physician: Yosef Duffy MD   Discharging Provider: Yosef Duffy MD  Primary Care Provider: Kenzie Blackman MD    Primary Care Team: Networked reference to record PCT     HPI:   72 yo female presents to ED due to positive blood cultures from the day before.  She had presented originally due to chills with N/V/D.  She was found to have a UTI and was sent home.  Blood cultures are growing GNR's so she was called back to the ED for admission.  No WBC elevation and no fever.  The patient states that she feels back to normal.  Currently no fever/chills.  No N/V/D/C.  No chest pain/SOB.  No dysuria.  She will be placed on IV antibiotics and admitted to observation until cultures result.    * No surgery found *      Hospital Course:   7/10/24-Cultures resulted with E.coli in blood and urine.  Patient had some N/V this morning.  Will monitor.  Currently on IV Rocephin.    7/10/24-Patient is feeling better.  She was able to tolerate a diet for lunch and is asking to be discharged home.  Exam(A&O, NAD, RRR, CTA, BS +, NTTP, ROM I)     Goals of Care Treatment Preferences:  Code Status: Full Code      Consults:     No new Assessment & Plan notes have been filed under this hospital service since the last note was generated.  Service: Hospital Medicine    Final Active Diagnoses:    Diagnosis Date Noted POA    PRINCIPAL PROBLEM:  Bacteremia [R78.81] 07/09/2024 Yes    Essential hypertension [I10] 03/05/2018 Yes     Chronic    Type 2 diabetes mellitus with stage 2 chronic kidney disease, with long-term current use of insulin [E11.22, N18.2, Z79.4] 06/15/2016 Not Applicable     Chronic      Problems Resolved During this  Admission:       Discharged Condition: stable    Disposition: Home or Self Care    Follow Up:  PCP in 1 week    Patient Instructions:   No discharge procedures on file.    Significant Diagnostic Studies: Labs: BMP:   Recent Labs   Lab 07/08/24 2039 07/09/24  0400    144   K 4.0 4.0    111*   CO2 24 25   BUN 20.0 18.0   CREATININE 1.30* 1.15*   CALCIUM 9.0 8.7   , CMP   Recent Labs   Lab 07/08/24 2039 07/09/24  0400    144   K 4.0 4.0    111*   CO2 24 25   BUN 20.0 18.0   CREATININE 1.30* 1.15*   CALCIUM 9.0 8.7   ALBUMIN 3.5  --    BILITOT 0.5  --    ALKPHOS 60  --    AST 18  --    ALT 16  --    , and CBC   Recent Labs   Lab 07/08/24 2039 07/09/24  0400   WBC 9.22 8.31   HGB 10.8* 10.1*   HCT 34.2* 32.8*    174       Pending Diagnostic Studies:       None           Medications:  Reconciled Home Medications:      Medication List        CONTINUE taking these medications      acetaminophen 500 MG tablet  Commonly known as: TYLENOL  Take 500 mg by mouth every 6 (six) hours as needed for Pain.     aspirin 81 MG Chew  Take 81 mg by mouth once daily at 6am.     atorvastatin 80 MG tablet  Commonly known as: LIPITOR  Take 1 tablet (80 mg total) by mouth once daily.     blood sugar diagnostic Strp  1 strip by Misc.(Non-Drug; Combo Route) route 2 (two) times a day. To check BG 2 times daily (AM morning fasting, and 2 hours after dinner), to use with insurance preferred meter     carvediloL 12.5 MG tablet  Commonly known as: COREG  Take 1 tablet (12.5 mg total) by mouth 2 (two) times daily.     ciprofloxacin HCl 500 MG tablet  Commonly known as: CIPRO  Take 1 tablet (500 mg total) by mouth 2 (two) times daily. for 10 days     clobetasol 0.05% 0.05 % Oint  Commonly known as: TEMOVATE  Apply nightly x1 month, then every other night x1 month, then 2x/week going forward     dicyclomine 20 mg tablet  Commonly known as: BENTYL  Take 1 tablet (20 mg total) by mouth 2 (two) times daily as needed  "(diarrhea).     diphenhydrAMINE 25 mg capsule  Commonly known as: BENADRYL  Take 1 capsule (25 mg total) by mouth every 12 (twelve) hours as needed for Itching or Allergies.     gabapentin 300 MG capsule  Commonly known as: NEURONTIN  Take 1 capsule (300 mg total) by mouth As instructed (back pain). 300mg in the morning and 300mg in the afternoon, take 600mg at night     HYDROcodone-acetaminophen 5-325 mg per tablet  Commonly known as: NORCO  Take 1 tablet by mouth 2 (two) times daily as needed for Pain.     lancets Misc  Commonly known as: LANCETS,THIN  1 Needle by Misc.(Non-Drug; Combo Route) route 2 (two) times a day.     LANTUS SOLOSTAR U-100 INSULIN 100 unit/mL (3 mL) Inpn pen  Generic drug: insulin glargine U-100 (Lantus)  Inject 26 Units into the skin once daily.     LIDOcaine 5 %  Commonly known as: LIDODERM  Place 1 patch onto the skin every 24 hours. Remove & Discard patch within 12 hours or as directed by MD     lisinopriL 10 MG tablet  Take 1 tablet (10 mg total) by mouth once daily.     loratadine 10 mg tablet  Commonly known as: CLARITIN  Take 10 mg by mouth once daily.     metFORMIN 1000 MG tablet  Commonly known as: GLUCOPHAGE  Take 1 tablet (1,000 mg total) by mouth 2 (two) times daily with meals.     multivitamin capsule  Take 1 capsule by mouth once daily.     ondansetron 4 MG tablet  Commonly known as: ZOFRAN  Take 1 tablet (4 mg total) by mouth every 6 (six) hours.     pen needle, diabetic 29 gauge x 1/2" Ndle  Insulin pen needles, See Instructions, Inuslin pen needles for once a day Lantus injection  E11.65, # 90 EA, 3 Refill(s), Pharmacy: [facility] [id number], 162, cm, Height/Length Dosing, 12/10/21 9:34:00 CST, 90.2, kg, Weight Dosing, 12/10/21 9:34:00 CST     VITAMIN B-12 50 mcg tablet  Generic drug: cyanocobalamin (vitamin B-12)  Take 50 mcg by mouth once daily.     vitamin D 1000 units Tab  Commonly known as: VITAMIN D3  Take 1,000 Units by mouth once daily.              Indwelling " Lines/Drains at time of discharge:   Lines/Drains/Airways       None                   Time spent on the discharge of patient: 38 minutes          Patient screened for food insecurity, housing instability, transportation needs, utility difficulties, and interpersonal safety.   No needs  Yosef Duffy MD  Department of Hospital Medicine  [facility]

## 2024-07-10 NOTE — SUBJECTIVE & OBJECTIVE
Interval History:     Review of Systems   Constitutional: Negative.    HENT: Negative.     Eyes: Negative.    Respiratory: Negative.     Cardiovascular: Negative.    Gastrointestinal:  Positive for nausea and vomiting.   Endocrine: Negative.    Genitourinary: Negative.    Musculoskeletal: Negative.    Skin: Negative.    Allergic/Immunologic: Negative.    Neurological: Negative.    Hematological: Negative.    Psychiatric/Behavioral: Negative.       Objective:     Vital Signs (Most Recent):  Temp: 98.5 °F (36.9 °C) (07/10/24 0800)  Pulse: 71 (07/10/24 0800)  Resp: 18 (07/10/24 0800)  BP: (!) 149/86 (07/10/24 0800)  SpO2: 97 % (07/10/24 0800) Vital Signs (24h Range):  Temp:  [98.5 °F (36.9 °C)-98.6 °F (37 °C)] 98.5 °F (36.9 °C)  Pulse:  [69-80] 71  Resp:  [18] 18  SpO2:  [95 %-97 %] 97 %  BP: (131-153)/(80-89) 149/86     Weight: 76 kg (167 lb 8.8 oz)  Body mass index is 28.76 kg/m².  No intake or output data in the 24 hours ending 07/10/24 1201      Physical Exam  Constitutional:       Appearance: Normal appearance. She is normal weight.   HENT:      Head: Normocephalic and atraumatic.      Nose: Nose normal.      Mouth/Throat:      Mouth: Mucous membranes are moist.      Pharynx: Oropharynx is clear.   Eyes:      Extraocular Movements: Extraocular movements intact.      Conjunctiva/sclera: Conjunctivae normal.      Pupils: Pupils are equal, round, and reactive to light.   Cardiovascular:      Rate and Rhythm: Normal rate and regular rhythm.      Pulses: Normal pulses.      Heart sounds: Normal heart sounds.   Pulmonary:      Effort: Pulmonary effort is normal.      Breath sounds: Normal breath sounds.   Abdominal:      General: Bowel sounds are normal.      Palpations: Abdomen is soft.   Musculoskeletal:         General: Normal range of motion.      Cervical back: Normal range of motion and neck supple.   Skin:     General: Skin is warm and dry.      Capillary Refill: Capillary refill takes 2 to 3 seconds.    Neurological:      General: No focal deficit present.      Mental Status: She is alert and oriented to person, place, and time. Mental status is at baseline.   Psychiatric:         Mood and Affect: Mood normal.         Behavior: Behavior normal.         Thought Content: Thought content normal.         Judgment: Judgment normal.             Significant Labs: All pertinent labs within the past 24 hours have been reviewed.    Significant Imaging: I have reviewed all pertinent imaging results/findings within the past 24 hours.

## 2024-07-10 NOTE — PLAN OF CARE
07/10/24 1433   Final Note   Assessment Type Final Discharge Note   Anticipated Discharge Disposition Home   What phone number can be called within the next 1-3 days to see how you are doing after discharge? [phone]   Hospital Resources/Appts/Education Provided Provided education on problems/symptoms using teachback   Post-Acute Status   Discharge Delays None known at this time

## 2024-07-10 NOTE — PLAN OF CARE
Problem: Adult Inpatient Plan of Care  Goal: Plan of Care Review  7/10/2024 1453 by Nubia Lovelace RN  Outcome: Met  7/10/2024 1315 by Nubia Lovelace RN  Outcome: Progressing  Goal: Patient-Specific Goal (Individualized)  7/10/2024 1453 by Nubia Lovelace RN  Outcome: Met  7/10/2024 1315 by Nubia Lovelace RN  Outcome: Progressing  Goal: Absence of Hospital-Acquired Illness or Injury  7/10/2024 1453 by Nubia Lovelace RN  Outcome: Met  7/10/2024 1315 by Nubia Lovelace RN  Outcome: Progressing  Goal: Optimal Comfort and Wellbeing  7/10/2024 1453 by Nubia Lovelace RN  Outcome: Met  7/10/2024 1315 by Nubia Lovelace RN  Outcome: Progressing  Goal: Readiness for Transition of Care  7/10/2024 1453 by Nubia Lovelace RN  Outcome: Met  7/10/2024 1315 by Nubia Lovelace RN  Outcome: Progressing     Problem: Diabetes Comorbidity  Goal: Blood Glucose Level Within Targeted Range  7/10/2024 1453 by Nubia Lovelace RN  Outcome: Met  7/10/2024 1315 by Nubia Lovelace RN  Outcome: Progressing     Problem: Infection  Goal: Absence of Infection Signs and Symptoms  7/10/2024 1453 by Nubia Lovelace RN  Outcome: Met  7/10/2024 1315 by Nubia Lovelace RN  Outcome: Progressing     Problem: Comorbidity Management  Goal: Blood Pressure in Desired Range  7/10/2024 1453 by Nubia Lovelace RN  Outcome: Met  7/10/2024 1315 by Nubia Lovelace RN  Outcome: Progressing     Problem: Pain Chronic (Persistent)  Goal: Optimal Pain Control and Function  7/10/2024 1453 by Nubia Lovelace RN  Outcome: Met  7/10/2024 1315 by Nubia Lovelace RN  Outcome: Progressing     Problem: Fall Injury Risk  Goal: Absence of Fall and Fall-Related Injury  7/10/2024 1453 by Nubia Lovelace RN  Outcome: Met  7/10/2024 1315 by Nubia Lovelace RN  Outcome: Progressing

## 2024-07-10 NOTE — HOSPITAL COURSE
7/10/24-Cultures resulted with E.coli in blood and urine.  Patient had some N/V this morning.  Will monitor.  Currently on IV Rocephin.    7/10/24-Patient is feeling better.  She was able to tolerate a diet for lunch and is asking to be discharged home.  Exam(A&O, NAD, RRR, CTA, BS +, NTTP, ROM I)

## 2024-07-11 LAB
BACTERIA UR CULT: ABNORMAL
BACTERIA UR CULT: ABNORMAL

## 2024-07-13 LAB
BACTERIA BLD CULT: NORMAL
BACTERIA BLD CULT: NORMAL

## 2024-07-17 ENCOUNTER — PATIENT OUTREACH (OUTPATIENT)
Dept: ADMINISTRATIVE | Facility: CLINIC | Age: 74
End: 2024-07-17
Payer: MEDICARE

## 2024-07-17 NOTE — PROGRESS NOTES
C3 nurse attempted to contact Marnie BETTY Brigitte  for a TCC post hospital discharge follow up call. No answer. No voicemail available. The patient has a scheduled HOSFU appointment with Kenzie Blackman MD 7/29/24 @3pm

## 2024-07-18 NOTE — PROGRESS NOTES
C3 nurse attempted to contact Marnie BETTY Brigitte  for a TCC post hospital discharge follow up call. The patient is unable to conduct the call @ this time. The patient requested a callback.    The patient has a scheduled HOSFU appointment with Kenzie Blackman MD 7/29/24 @3pm

## 2024-08-05 RX ORDER — HYDROCODONE BITARTRATE AND ACETAMINOPHEN 5; 325 MG/1; MG/1
1 TABLET ORAL EVERY 6 HOURS PRN
COMMUNITY
End: 2024-08-08 | Stop reason: SDUPTHER

## 2024-08-08 ENCOUNTER — OFFICE VISIT (OUTPATIENT)
Dept: FAMILY MEDICINE | Facility: CLINIC | Age: 74
End: 2024-08-08
Payer: MEDICARE

## 2024-08-08 VITALS
TEMPERATURE: 98 F | HEART RATE: 67 BPM | OXYGEN SATURATION: 100 % | WEIGHT: 164 LBS | RESPIRATION RATE: 18 BRPM | BODY MASS INDEX: 28 KG/M2 | SYSTOLIC BLOOD PRESSURE: 136 MMHG | DIASTOLIC BLOOD PRESSURE: 84 MMHG | HEIGHT: 64 IN

## 2024-08-08 DIAGNOSIS — M54.16 LUMBAR RADICULOPATHY, CHRONIC: ICD-10-CM

## 2024-08-08 DIAGNOSIS — M51.36 BULGING LUMBAR DISC: ICD-10-CM

## 2024-08-08 DIAGNOSIS — Z01.89 ENCOUNTER FOR GERIATRIC ASSESSMENT: ICD-10-CM

## 2024-08-08 DIAGNOSIS — M54.9 DORSALGIA, UNSPECIFIED: ICD-10-CM

## 2024-08-08 DIAGNOSIS — N18.9 CHRONIC KIDNEY DISEASE, UNSPECIFIED CKD STAGE: ICD-10-CM

## 2024-08-08 DIAGNOSIS — M48.061 LUMBAR FORAMINAL STENOSIS: ICD-10-CM

## 2024-08-08 DIAGNOSIS — R19.7 DIARRHEA, UNSPECIFIED TYPE: Primary | ICD-10-CM

## 2024-08-08 DIAGNOSIS — Z98.1 HISTORY OF FUSION OF CERVICAL SPINE: ICD-10-CM

## 2024-08-08 PROCEDURE — 99215 OFFICE O/P EST HI 40 MIN: CPT | Mod: PBBFAC | Performed by: FAMILY MEDICINE

## 2024-08-08 RX ORDER — FAMOTIDINE 20 MG/1
20 TABLET, FILM COATED ORAL 2 TIMES DAILY
Qty: 60 TABLET | Refills: 11 | Status: CANCELLED | OUTPATIENT
Start: 2024-08-08 | End: 2025-08-08

## 2024-08-08 RX ORDER — HYDROCODONE BITARTRATE AND ACETAMINOPHEN 5; 325 MG/1; MG/1
1 TABLET ORAL EVERY 6 HOURS PRN
Qty: 56 TABLET | Refills: 0 | Status: SHIPPED | OUTPATIENT
Start: 2024-08-08 | End: 2024-08-22

## 2024-08-12 ENCOUNTER — APPOINTMENT (OUTPATIENT)
Dept: LAB | Facility: HOSPITAL | Age: 74
End: 2024-08-12
Payer: MEDICARE

## 2024-08-18 ENCOUNTER — ANESTHESIA EVENT (OUTPATIENT)
Dept: SURGERY | Facility: HOSPITAL | Age: 74
End: 2024-08-18
Payer: MEDICARE

## 2024-08-18 RX ORDER — ONDANSETRON HYDROCHLORIDE 2 MG/ML
4 INJECTION, SOLUTION INTRAVENOUS ONCE AS NEEDED
OUTPATIENT
Start: 2024-08-18 | End: 2036-01-15

## 2024-08-18 RX ORDER — GLUCAGON 1 MG
1 KIT INJECTION
OUTPATIENT
Start: 2024-08-18

## 2024-08-19 ENCOUNTER — HOSPITAL ENCOUNTER (OUTPATIENT)
Facility: HOSPITAL | Age: 74
Discharge: HOME | End: 2024-08-19
Attending: ANESTHESIOLOGY | Admitting: ANESTHESIOLOGY
Payer: MEDICARE

## 2024-08-19 ENCOUNTER — ANESTHESIA (OUTPATIENT)
Dept: SURGERY | Facility: HOSPITAL | Age: 74
End: 2024-08-19
Payer: MEDICARE

## 2024-08-19 VITALS
BODY MASS INDEX: 27.36 KG/M2 | HEIGHT: 64 IN | HEART RATE: 85 BPM | RESPIRATION RATE: 18 BRPM | TEMPERATURE: 98 F | WEIGHT: 160.25 LBS | DIASTOLIC BLOOD PRESSURE: 96 MMHG | SYSTOLIC BLOOD PRESSURE: 152 MMHG | OXYGEN SATURATION: 98 %

## 2024-08-19 DIAGNOSIS — M54.9 CHRONIC BACK PAIN GREATER THAN 3 MONTHS DURATION: ICD-10-CM

## 2024-08-19 DIAGNOSIS — G89.29 CHRONIC BACK PAIN GREATER THAN 3 MONTHS DURATION: ICD-10-CM

## 2024-08-19 PROCEDURE — 64484 NJX AA&/STRD TFRM EPI L/S EA: CPT | Mod: LT,,, | Performed by: ANESTHESIOLOGY

## 2024-08-19 PROCEDURE — 63600175 PHARM REV CODE 636 W HCPCS: Performed by: ANESTHESIOLOGY

## 2024-08-19 PROCEDURE — 63600175 PHARM REV CODE 636 W HCPCS: Performed by: NURSE ANESTHETIST, CERTIFIED REGISTERED

## 2024-08-19 PROCEDURE — 25000003 PHARM REV CODE 250: Performed by: ANESTHESIOLOGY

## 2024-08-19 PROCEDURE — 64483 NJX AA&/STRD TFRM EPI L/S 1: CPT | Mod: RT | Performed by: ANESTHESIOLOGY

## 2024-08-19 PROCEDURE — 64483 NJX AA&/STRD TFRM EPI L/S 1: CPT | Mod: RT,,, | Performed by: ANESTHESIOLOGY

## 2024-08-19 PROCEDURE — 64484 NJX AA&/STRD TFRM EPI L/S EA: CPT | Mod: LT | Performed by: ANESTHESIOLOGY

## 2024-08-19 PROCEDURE — D9220A PRA ANESTHESIA: Mod: ANES,,, | Performed by: ANESTHESIOLOGY

## 2024-08-19 PROCEDURE — D9220A PRA ANESTHESIA: Mod: CRNA,,, | Performed by: NURSE ANESTHETIST, CERTIFIED REGISTERED

## 2024-08-19 PROCEDURE — 37000008 HC ANESTHESIA 1ST 15 MINUTES: Performed by: ANESTHESIOLOGY

## 2024-08-19 RX ORDER — LIDOCAINE HYDROCHLORIDE 10 MG/ML
INJECTION, SOLUTION EPIDURAL; INFILTRATION; INTRACAUDAL; PERINEURAL
Status: DISCONTINUED
Start: 2024-08-19 | End: 2024-08-19 | Stop reason: HOSPADM

## 2024-08-19 RX ORDER — BUPIVACAINE HYDROCHLORIDE 2.5 MG/ML
INJECTION, SOLUTION EPIDURAL; INFILTRATION; INTRACAUDAL
Status: DISCONTINUED | OUTPATIENT
Start: 2024-08-19 | End: 2024-08-19 | Stop reason: HOSPADM

## 2024-08-19 RX ORDER — PROPOFOL 10 MG/ML
VIAL (ML) INTRAVENOUS
Status: DISCONTINUED | OUTPATIENT
Start: 2024-08-19 | End: 2024-08-19

## 2024-08-19 RX ORDER — DEXAMETHASONE SODIUM PHOSPHATE 10 MG/ML
INJECTION INTRAMUSCULAR; INTRAVENOUS
Status: DISCONTINUED | OUTPATIENT
Start: 2024-08-19 | End: 2024-08-19 | Stop reason: HOSPADM

## 2024-08-19 RX ORDER — BUPIVACAINE HYDROCHLORIDE 2.5 MG/ML
INJECTION, SOLUTION EPIDURAL; INFILTRATION; INTRACAUDAL
Status: DISCONTINUED
Start: 2024-08-19 | End: 2024-08-19 | Stop reason: HOSPADM

## 2024-08-19 RX ORDER — DEXAMETHASONE SODIUM PHOSPHATE 10 MG/ML
INJECTION INTRAMUSCULAR; INTRAVENOUS
Status: DISCONTINUED
Start: 2024-08-19 | End: 2024-08-19 | Stop reason: HOSPADM

## 2024-08-19 RX ORDER — LIDOCAINE HYDROCHLORIDE 10 MG/ML
INJECTION, SOLUTION EPIDURAL; INFILTRATION; INTRACAUDAL; PERINEURAL
Status: DISCONTINUED | OUTPATIENT
Start: 2024-08-19 | End: 2024-08-19 | Stop reason: HOSPADM

## 2024-08-19 RX ADMIN — PROPOFOL 60 MG: 10 INJECTION, EMULSION INTRAVENOUS at 12:08

## 2024-08-19 NOTE — DISCHARGE SUMMARY
[facility] - Periop Services  Discharge Note  Short Stay    Procedure(s) (LRB):  INJECTION, STEROID, EPIDURAL, TRANSFORAMINAL APPROACH  ///Rt L4 & Lt L5 (Bilateral)      OUTCOME: Patient tolerated treatment/procedure well without complication and is now ready for discharge.    DISPOSITION: Home or Self Care    FINAL DIAGNOSIS:  <principal problem not specified>    FOLLOWUP: In clinic    DISCHARGE INSTRUCTIONS:  No discharge procedures on file.     TIME SPENT ON DISCHARGE: 5 minutes

## 2024-08-19 NOTE — TRANSFER OF CARE
"Anesthesia Transfer of Care Note    Patient: Marnie Briggs    Procedure(s) Performed: Procedure(s) (LRB):  INJECTION, STEROID, EPIDURAL, TRANSFORAMINAL APPROACH  ///Rt L4 & Lt L5 (Bilateral)    Patient location: OPS    Anesthesia Type: general    Transport from OR: Transported from OR on room air with adequate spontaneous ventilation    Post pain: adequate analgesia    Post assessment: no apparent anesthetic complications    Post vital signs: stable    Level of consciousness: awake, alert and oriented    Nausea/Vomiting: no nausea/vomiting    Complications: none    Transfer of care protocol was followed      Last vitals: Visit Vitals  BP (!) 168/102   Pulse 79   Temp 36.6 °C (97.9 °F) (Oral)   Resp 18   Ht 5' 4" (1.626 m)   Wt 72.7 kg (160 lb 4.4 oz)   SpO2 97%   Breastfeeding No   BMI 27.51 kg/m²     "

## 2024-08-19 NOTE — ANESTHESIA POSTPROCEDURE EVALUATION
Anesthesia Post Evaluation    Patient: Marnie Briggs    Procedure(s) Performed: Procedure(s) (LRB):  INJECTION, STEROID, EPIDURAL, TRANSFORAMINAL APPROACH  ///Rt L4 & Lt L5 (Bilateral)    Final Anesthesia Type: general      Patient location during evaluation: OPS  Patient participation: Yes- Able to Participate  Level of consciousness: awake and oriented  Post-procedure vital signs: reviewed and stable  Pain management: adequate  Airway patency: patent    PONV status at discharge: No PONV  Anesthetic complications: no      Cardiovascular status: hemodynamically stable  Respiratory status: unassisted and spontaneous ventilation  Hydration status: euvolemic  Follow-up not needed.              Vitals Value Taken Time   /102 08/19/24 1506   Temp 36.6 08/19/24 1506   Pulse 79 08/19/24 1506   Resp 18 08/19/24 1506   SpO2 97% 08/19/24 1506         No case tracking events are documented in the log.      Pain/Noe Score: No data recorded

## 2024-08-19 NOTE — ANESTHESIA PREPROCEDURE EVALUATION
08/18/2024  Marnie Briggs is a 73 y.o., female, who presents for the following:    Procedure: INJECTION, STEROID, EPIDURAL, TRANSFORAMINAL APPROACH  ///Rt L4 & Lt L5 (Bilateral: Back) - TFESI Rt L4 & Lt L5   Anesthesia type: Gen Natural Airway   Diagnosis:      Lumbar degenerative disc disease [M51.36]      Lumbar radiculitis [M54.16]      Lumbar spondylosis [M47.816]   Pre-op diagnosis:      Lumbar degenerative disc disease [M51.36]      Lumbar radiculitis [M54.16]      Lumbar spondylosis [M47.816]   Location: [facility] VIRTUAL PROCEDURAL ROOM / [facility] OR   Surgeons: Marycruz Atwood MD       Pre-op Assessment    I have reviewed the Patient Summary Reports.     I have reviewed the Nursing Notes. I have reviewed the NPO Status.   I have reviewed the Medications.     Review of Systems  Anesthesia Hx:  No problems with previous Anesthesia             Denies Family Hx of Anesthesia complications.    Denies Personal Hx of Anesthesia complications.                    Social:  Non-Smoker       Cardiovascular:     Hypertension           hyperlipidemia    Bilat MARCUS                         Pulmonary:  Pulmonary Normal                       Renal/:  Chronic Renal Disease   CKD2             Hepatic/GI:     GERD             Musculoskeletal:  Arthritis               Endocrine:  Diabetes, type 2   Hashimoto thyroiditis        Psych:    depression                Physical Exam  General: Alert and Oriented    Airway:  Mallampati: II   Mouth Opening: Normal  TM Distance: Normal  Tongue: Normal  Neck ROM: Normal ROM    Dental:  Intact  Age Related Wear  Chest/Lungs:  Normal Respiratory Rate    Heart:  Rate: Normal  Rhythm: Regular Rhythm        Anesthesia Plan  Type of Anesthesia, risks & benefits discussed:    Anesthesia Type: Gen Natural Airway  Intra-op Monitoring Plan: Standard ASA Monitors  Post Op Pain Control Plan: IV/PO  Opioids PRN  Induction:  IV  Airway Plan: Direct  Informed Consent: Informed consent signed with the Patient and all parties understand the risks and agree with anesthesia plan.  All questions answered. Patient consented to blood products? No  ASA Score: 3  Day of Surgery Review of History & Physical: H&P Update referred to the surgeon/provider.  Anesthesia Plan Notes: Nasal cannula vs facemask supplemental oxygenation   For patients with PARRISH/obesity, may consider SuperNoval Nasal CPAP      Ready For Surgery From Anesthesia Perspective.     .

## 2024-08-19 NOTE — OP NOTE
Procedure:    Right L4  transforaminal epidural steroid injection    Left L5  transforaminal epidural steroid injection    Pre-Procedure Diagnoses:  Chronic back pain greater than 3 months  Lumbar degenerative disc disease  Lumbar radiculopathy  Lumbar disc displacement    Post-Procedure Diagnoses:  Chronic back pain greater than 3 months  Lumbar degenerative disc disease  Lumbar radiculopathy  Lumbar disc displacement    Anesthesia:  Local and MAC    Estimated Blood Loss:  < 2 ML    Consent:  The procedure, risks, benefits, and alternatives were discussed with the patient.  The patient voiced understanding and fully informed written consent was obtained.    Description of the Procedure:  The patient was taken to the operating room and placed in the prone position. The skin overlying the lumbar spine was prepped with Chloraprep and draped in the usual sterile fashion.  An oblique fluoroscopic view was obtained on the right side at L4, with the superior articular process of the inferior vertebral body aligned with the pedicle.  Skin anesthesia was achieved using 2 mL of lidocaine 1%.  A 22-gauge 5 -inch Quinke spinal needle was inserted and advanced under intermittent fluoroscopic views into the epidural space. Proper needle position was confirmed under AP, oblique, and lateral fluoroscopic views.  Negative aspiration for blood or CSF was confirmed. 1 mL of contrast was injected, which revealed spread into the epidural space.  Then a combination of 5 mg of dexamethasone with 1 mL of 0.25% bupivacaine was easily injected.   There was no pain on injection. The needle was removed intact and bleeding was nil.  The same procedure was repeated in identical fashion on the left side at L5 .  Sterile bandages were applied. The patient was taken to the recovery room for further observation in stable condition. The patient was then discharged home with no complications.

## 2024-08-19 NOTE — H&P
Provider PCP Bisi Blackman MD General                Reason for Visit / Chief Complaint: Back Pain (Referred by Dr Espino, chronic right side sciatica, prior cervical fusion, no prior lumbar surgery had injections in the post, completed P.T about 1 month ago, norco, gabapentin  and tylenol for relief, pain level 8/10)         Update PCP   Update Chief Complaint             History of Present Illness / Problem Focused Workflow      Marnie Briggs presents to the clinic with Back Pain (Referred by Dr Espino, chronic right side sciatica, prior cervical fusion, no prior lumbar surgery had injections in the post, completed P.T about 1 month ago, norco, gabapentin  and tylenol for relief, pain level 8/10)     This is a 73-year-old female who presents to clinic today for her initial consultation as a referral from her primary care physician.  She complains of low back pain that has been present since she fell in the 1980s.  She has not undergone any lumbar spine surgery, although she did have cervical spine surgery several years ago.  She currently rates her pain as 8/10 on the NRS.  It gets down to 5/10 at best.  She finished a course of physical therapy about 1 month ago, which helped somewhat while she was going to it.  She is trying to continue doing the exercises and stretches at home.  She does recall undergoing some injections in her back a long time ago, but does not remember any details about it.  Pain radiates down the bilateral lower extremities into the posterior thighs.  She also reports numbness and tingling in her feet.        Back Pain  Associated symptoms include leg pain and numbness.         Review of Systems      Review of Systems   Musculoskeletal:  Positive for back pain and leg pain.   Neurological:  Positive for numbness.   Psychiatric/Behavioral:  Positive for sleep disturbance.    All other systems reviewed and are negative.        Medical / Social / Family History           Past  Medical History:   Diagnosis Date    CKD (chronic kidney disease) stage 2, GFR 60-89 ml/min      DM (diabetes mellitus)      HLD (hyperlipidemia)      HTN (hypertension)                 Past Surgical History:   Procedure Laterality Date    ANKLE FUSION Right      CATARACT EXTRACTION Bilateral      CHOLECYSTECTOMY        COLONOSCOPY   11/30/2015    HYSTERECTOMY        NECK SURGERY        stint         Right kidney         Social History  Ms. Briggs  reports that she has never smoked. She has never been exposed to tobacco smoke. She has never used smokeless tobacco. She reports that she does not drink alcohol and does not use drugs.     Family History  Ms.'s Briggs family history includes Cancer in her brother; Diabetes in her brother, father, and mother; Heart disease in her father; Hypertension in her father; Stroke in her mother.     Medications and Allergies      Medications         Outpatient Medications Marked as Taking for the 7/2/24 encounter (Office Visit) with Marycruz Atwood MD   Medication Sig Dispense Refill    acetaminophen (TYLENOL) 500 MG tablet Take 500 mg by mouth every 6 (six) hours as needed for Pain.        aspirin 81 MG Chew Take 81 mg by mouth once daily at 6am.        atorvastatin (LIPITOR) 80 MG tablet Take 1 tablet (80 mg total) by mouth once daily. 90 tablet 3    blood sugar diagnostic Strp 1 strip by Misc.(Non-Drug; Combo Route) route 2 (two) times a day. To check BG 2 times daily (AM morning fasting, and 2 hours after dinner), to use with insurance preferred meter 99 each 2    carvediloL (COREG) 12.5 MG tablet Take 1 tablet (12.5 mg total) by mouth 2 (two) times daily. 180 tablet 3    clobetasol 0.05% (TEMOVATE) 0.05 % Oint Apply nightly x1 month, then every other night x1 month, then 2x/week going forward 60 g 6    cyanocobalamin, vitamin B-12, (VITAMIN B-12) 50 mcg tablet Take 50 mcg by mouth once daily.        gabapentin (NEURONTIN) 300 MG capsule Take 1 capsule (300 mg total) by  "mouth As instructed (back pain). 300mg in the morning and 300mg in the afternoon, take 600mg at night        HYDROcodone-acetaminophen (NORCO) 5-325 mg per tablet Take 1 tablet by mouth 2 (two) times daily as needed for Pain. 60 tablet 0    lancets (LANCETS,THIN) Misc 1 Needle by Misc.(Non-Drug; Combo Route) route 2 (two) times a day. 100 each 5    LANTUS SOLOSTAR U-100 INSULIN glargine 100 units/mL SubQ pen Inject 26 Units into the skin once daily. 23.4 mL 2    LIDOcaine (LIDODERM) 5 % Place 1 patch onto the skin every 24 hours. Remove & Discard patch within 12 hours or as directed by MD        lisinopriL 10 MG tablet Take 1 tablet (10 mg total) by mouth once daily. 90 tablet 3    loratadine (CLARITIN) 10 mg tablet Take 10 mg by mouth once daily.        metFORMIN (GLUCOPHAGE) 1000 MG tablet Take 1 tablet (1,000 mg total) by mouth 2 (two) times daily with meals. 180 tablet 2    mirtazapine (REMERON) 15 MG tablet Take 15 mg by mouth every evening.        multivitamin capsule Take 1 capsule by mouth once daily.        pen needle, diabetic 29 gauge x 1/2" Ndle    Insulin pen needles, See Instructions, Inuslin pen needles for once a day Lantus injection  E11.65, # 90 EA, 3 Refill(s), Pharmacy: [id number], 162, cm, Height/Length Dosing, 12/10/21 9:34:00 CST, 90.2, kg, Weight Dosing, 12/10/21 9:34:00 CST        vitamin D (VITAMIN D3) 1000 units Tab Take 1,000 Units by mouth once daily.             Allergies  Review of patient's allergies indicates:  No Known Allergies     Physical Examination          Vitals:     07/02/24 1057   BP: 118/73   Pulse: 71   Temp: 98.5 °F (36.9 °C)      Pain Disability Index (PDI): 48     Spine Musculoskeletal Exam     Gait    Gait is normal.     Inspection    Thoracolumbar    Thoracolumbar inspection is normal.     Palpation    Thoracolumbar    Tenderness: none    Right      Masses: none      Spasms: none      Crepitus: none      Muscle tone: normal    Left      Masses: none      " Spasms: none      Crepitus: none      Muscle tone: normal     Range of Motion    Thoracolumbar        Thoracolumbar range of motion additional comments: Limited range of motion in lumbar spine due to pain.     Strength    Thoracolumbar    Thoracolumbar motor exam is normal.        Sensory    Thoracolumbar    Thoracolumbar sensation is normal.     General      Constitutional: appears stated age, well-developed and well-nourished    Scleral icterus: no    Labored breathing: no    Psychiatric: normal mood and affect and no acute distress    Neurological: alert and oriented x3    Skin: intact    Lymphadenopathy: none  CV: extremities warm, well-perfused  Resp: nonlabored breathing     Assessment and Plan (including Health Maintenance)       Problem List   Smart Sets   Document Outside HM   :     Plan:   Chronic back pain greater than 3 months duration     Chronic right-sided low back pain with right-sided sciatica  -     Ambulatory referral/consult to Pain Clinic     History of fusion of cervical spine  -     Ambulatory referral/consult to Pain Clinic     Chronic cervical pain  -     Ambulatory referral/consult to Pain Clinic     Lumbar degenerative disc disease     Lumbar radiculitis        She is being scheduled for right L4 and left L5 transforaminal epidural steroid injections today to help with her chronic low back pain radiating down the bilateral lower extremities, consistent with findings of degenerative disc disease and foraminal stenosis seen on her MRI.  The plan was discussed with the patient and she wishes to proceed.

## 2024-08-20 LAB — POCT GLUCOSE: 157 MG/DL (ref 70–110)

## 2024-08-22 ENCOUNTER — LAB VISIT (OUTPATIENT)
Dept: LAB | Facility: HOSPITAL | Age: 74
End: 2024-08-22
Attending: NURSE PRACTITIONER
Payer: MEDICARE

## 2024-08-22 ENCOUNTER — OFFICE VISIT (OUTPATIENT)
Dept: ENDOCRINOLOGY | Facility: CLINIC | Age: 74
End: 2024-08-22
Payer: MEDICARE

## 2024-08-22 VITALS
SYSTOLIC BLOOD PRESSURE: 140 MMHG | BODY MASS INDEX: 27.52 KG/M2 | WEIGHT: 161.19 LBS | HEIGHT: 64 IN | TEMPERATURE: 98 F | RESPIRATION RATE: 16 BRPM | HEART RATE: 65 BPM | DIASTOLIC BLOOD PRESSURE: 82 MMHG

## 2024-08-22 DIAGNOSIS — E11.22 TYPE 2 DIABETES MELLITUS WITH STAGE 2 CHRONIC KIDNEY DISEASE, WITH LONG-TERM CURRENT USE OF INSULIN: Chronic | ICD-10-CM

## 2024-08-22 DIAGNOSIS — Z79.4 TYPE 2 DIABETES MELLITUS WITH STAGE 2 CHRONIC KIDNEY DISEASE, WITH LONG-TERM CURRENT USE OF INSULIN: Chronic | ICD-10-CM

## 2024-08-22 DIAGNOSIS — E03.9 HYPOTHYROIDISM, UNSPECIFIED TYPE: Primary | ICD-10-CM

## 2024-08-22 DIAGNOSIS — E06.3 HYPOTHYROIDISM DUE TO HASHIMOTO'S THYROIDITIS: ICD-10-CM

## 2024-08-22 DIAGNOSIS — N18.2 TYPE 2 DIABETES MELLITUS WITH STAGE 2 CHRONIC KIDNEY DISEASE, WITH LONG-TERM CURRENT USE OF INSULIN: Chronic | ICD-10-CM

## 2024-08-22 DIAGNOSIS — E03.9 HYPOTHYROIDISM, UNSPECIFIED TYPE: ICD-10-CM

## 2024-08-22 DIAGNOSIS — E03.8 HYPOTHYROIDISM DUE TO HASHIMOTO'S THYROIDITIS: ICD-10-CM

## 2024-08-22 DIAGNOSIS — E06.3 HASHIMOTO'S THYROIDITIS: Chronic | ICD-10-CM

## 2024-08-22 LAB
EST. AVERAGE GLUCOSE BLD GHB EST-MCNC: 182.9 MG/DL
HBA1C MFR BLD: 8 %
T3FREE SERPL-MCNC: 2.91 PG/ML (ref 1.58–3.91)
T4 FREE SERPL-MCNC: 0.95 NG/DL (ref 0.7–1.48)
TSH SERPL-ACNC: 3.6 UIU/ML (ref 0.35–4.94)

## 2024-08-22 PROCEDURE — 1159F MED LIST DOCD IN RCRD: CPT | Mod: CPTII,,, | Performed by: NURSE PRACTITIONER

## 2024-08-22 PROCEDURE — 4010F ACE/ARB THERAPY RXD/TAKEN: CPT | Mod: CPTII,,, | Performed by: NURSE PRACTITIONER

## 2024-08-22 PROCEDURE — 1101F PT FALLS ASSESS-DOCD LE1/YR: CPT | Mod: CPTII,,, | Performed by: NURSE PRACTITIONER

## 2024-08-22 PROCEDURE — 3072F LOW RISK FOR RETINOPATHY: CPT | Mod: CPTII,,, | Performed by: NURSE PRACTITIONER

## 2024-08-22 PROCEDURE — 3061F NEG MICROALBUMINURIA REV: CPT | Mod: CPTII,,, | Performed by: NURSE PRACTITIONER

## 2024-08-22 PROCEDURE — 84481 FREE ASSAY (FT-3): CPT

## 2024-08-22 PROCEDURE — 3051F HG A1C>EQUAL 7.0%<8.0%: CPT | Mod: CPTII,,, | Performed by: NURSE PRACTITIONER

## 2024-08-22 PROCEDURE — 3008F BODY MASS INDEX DOCD: CPT | Mod: CPTII,,, | Performed by: NURSE PRACTITIONER

## 2024-08-22 PROCEDURE — 1160F RVW MEDS BY RX/DR IN RCRD: CPT | Mod: CPTII,,, | Performed by: NURSE PRACTITIONER

## 2024-08-22 PROCEDURE — 3079F DIAST BP 80-89 MM HG: CPT | Mod: CPTII,,, | Performed by: NURSE PRACTITIONER

## 2024-08-22 PROCEDURE — 3077F SYST BP >= 140 MM HG: CPT | Mod: CPTII,,, | Performed by: NURSE PRACTITIONER

## 2024-08-22 PROCEDURE — 36415 COLL VENOUS BLD VENIPUNCTURE: CPT

## 2024-08-22 PROCEDURE — 3288F FALL RISK ASSESSMENT DOCD: CPT | Mod: CPTII,,, | Performed by: NURSE PRACTITIONER

## 2024-08-22 PROCEDURE — 1125F AMNT PAIN NOTED PAIN PRSNT: CPT | Mod: CPTII,,, | Performed by: NURSE PRACTITIONER

## 2024-08-22 PROCEDURE — 83036 HEMOGLOBIN GLYCOSYLATED A1C: CPT

## 2024-08-22 PROCEDURE — 99214 OFFICE O/P EST MOD 30 MIN: CPT | Mod: S$PBB,,, | Performed by: NURSE PRACTITIONER

## 2024-08-22 PROCEDURE — 99215 OFFICE O/P EST HI 40 MIN: CPT | Mod: PBBFAC | Performed by: NURSE PRACTITIONER

## 2024-08-22 PROCEDURE — 3066F NEPHROPATHY DOC TX: CPT | Mod: CPTII,,, | Performed by: NURSE PRACTITIONER

## 2024-08-22 PROCEDURE — 84443 ASSAY THYROID STIM HORMONE: CPT

## 2024-08-22 PROCEDURE — 84439 ASSAY OF FREE THYROXINE: CPT

## 2024-08-22 RX ORDER — SEMAGLUTIDE 0.68 MG/ML
0.25 INJECTION, SOLUTION SUBCUTANEOUS
Qty: 3 ML | Refills: 5 | Status: SHIPPED | OUTPATIENT
Start: 2024-08-22

## 2024-08-22 NOTE — PROGRESS NOTES
Subjective     Patient ID: Marnie Briggs is a 73 y.o. female.    Chief Complaint: Hashimoto's Thyroiditis    Endocrine clinic note:  73 year female scheduled today previously established in Endocrine Clinic re-referred.  History of Hashimoto's/hypothyroidism previously seen for type 2 diabetes.  Hashimoto's/hypothyroidism patient is currently not on any medications. TSH 4.770 on 01/26/2024 no free T4 drawn at this time.  Previous provider notes states TPO was elevated TPO positive TPO quantitative 1130.  Patient has not been on any medication.  She denies any weight gain she states she has actually lost close to 20 lb but she has been trying and using portion control.  She denies excessive fatigue.  Patient was previously seen in Endocrine Clinic for type 2 diabetes with A1c of 6.7 and discharge back to PCP due to diabetes being controlled.  Patient's recent A1c increased will repeat today if remains elevated we will resume diabetes care.      Review of Systems   Constitutional:  Negative for activity change, appetite change and fatigue.   HENT:  Negative for dental problem, hearing loss, tinnitus, trouble swallowing and goiter.    Eyes:  Negative for photophobia, pain and visual disturbance.   Respiratory:  Negative for cough, chest tightness and wheezing.    Cardiovascular:  Negative for chest pain, palpitations and leg swelling.   Gastrointestinal:  Negative for abdominal pain, constipation, diarrhea, nausea and reflux.   Endocrine: Negative for cold intolerance, heat intolerance, polydipsia and polyphagia.   Genitourinary:  Negative for difficulty urinating, flank pain, hematuria, hot flashes, menstrual irregularity, menstrual problem, nocturia and urgency.   Musculoskeletal:  Negative for back pain, gait problem, joint swelling, leg pain and joint deformity.   Integumentary:  Negative for color change, pallor, rash and breast discharge.   Allergic/Immunologic: Negative for environmental allergies, food  allergies and immunocompromised state.   Neurological:  Negative for tremors, seizures, headaches, memory loss and coordination difficulties.   Psychiatric/Behavioral:  Negative for agitation, behavioral problems and sleep disturbance. The patient is not nervous/anxious.           Objective     Physical Exam  Constitutional:       General: She is not in acute distress.     Appearance: Normal appearance. She is not ill-appearing.   HENT:      Head: Normocephalic and atraumatic.      Right Ear: External ear normal.      Left Ear: External ear normal.      Nose: Nose normal. No congestion or rhinorrhea.      Mouth/Throat:      Mouth: Mucous membranes are moist.      Pharynx: Oropharynx is clear. No oropharyngeal exudate.   Eyes:      General:         Right eye: No discharge.         Left eye: No discharge.      Conjunctiva/sclera: Conjunctivae normal.      Pupils: Pupils are equal, round, and reactive to light.   Neck:      Thyroid: No thyroid mass, thyromegaly or thyroid tenderness.   Cardiovascular:      Rate and Rhythm: Normal rate and regular rhythm.      Pulses: Normal pulses.      Heart sounds: Normal heart sounds. No murmur heard.  Pulmonary:      Effort: Pulmonary effort is normal. No respiratory distress.      Breath sounds: Normal breath sounds.   Abdominal:      General: Abdomen is flat. Bowel sounds are normal. There is no distension.      Palpations: Abdomen is soft.      Tenderness: There is no abdominal tenderness.   Musculoskeletal:         General: No swelling or tenderness. Normal range of motion.      Cervical back: Normal range of motion and neck supple. No tenderness.      Right lower leg: No edema.      Left lower leg: No edema.   Feet:      Right foot:      Skin integrity: Skin integrity normal.      Left foot:      Skin integrity: Skin integrity normal.   Lymphadenopathy:      Cervical: No cervical adenopathy.   Skin:     General: Skin is warm and dry.      Coloration: Skin is not jaundiced or  pale.   Neurological:      General: No focal deficit present.      Mental Status: She is alert and oriented to person, place, and time. Mental status is at baseline.      Coordination: Coordination normal.      Gait: Gait normal.   Psychiatric:         Mood and Affect: Mood normal.         Behavior: Behavior normal.         Thought Content: Thought content normal.            Assessment and Plan     Hypothyroidism, unspecified type  TSH 4.770 on 01/26/2024  Labs today  No medication at this time  -     T4, Free; Future; Expected date: 08/22/2024  -     TSH; Future; Expected date: 08/22/2024  -     T3, Free (OLG); Future; Expected date: 08/22/2024  -     THYROID PEROXIDASE (TPO); Future; Expected date: 08/22/2024  Component Ref Range & Units 6 mo ago  (1/26/24) 1 yr ago  (8/7/23) 1 yr ago  (6/23/23) 2 yr ago  (5/19/22) 2 yr ago  (11/22/21) 3 yr ago  (6/4/21) 3 yr ago  (4/26/21)   TSH 0.350 - 4.940 uIU/mL 4.770 2.251 3.363 2.6152 R 4.7929 R 5.1088 High  R 5.7204 High       3. Type 2 diabetes mellitus with stage 2 chronic kidney disease, with long-term current use of insulin  Previously seen in Endocrine with A1c 6.7 then discharged PCP  Recent A1c increased if not at goal we will resume Diabetes Care  -     Hemoglobin A1C; Future; Expected date: 08/22/2024            Follow up in about 6 months (around 2/22/2025) for Hypothyroidism, Hashimotos, Type 2 diabetes.

## 2024-09-05 ENCOUNTER — OFFICE VISIT (OUTPATIENT)
Facility: CLINIC | Age: 74
End: 2024-09-05
Payer: MEDICARE

## 2024-09-05 VITALS
BODY MASS INDEX: 31.61 KG/M2 | WEIGHT: 161 LBS | SYSTOLIC BLOOD PRESSURE: 152 MMHG | DIASTOLIC BLOOD PRESSURE: 86 MMHG | HEART RATE: 64 BPM | HEIGHT: 60 IN

## 2024-09-05 DIAGNOSIS — M51.36 DDD (DEGENERATIVE DISC DISEASE), LUMBAR: Primary | ICD-10-CM

## 2024-09-05 DIAGNOSIS — M54.16 LUMBAR RADICULOPATHY: ICD-10-CM

## 2024-09-05 DIAGNOSIS — M47.816 LUMBAR SPONDYLOSIS: ICD-10-CM

## 2024-09-05 NOTE — PROGRESS NOTES
[facility]    Subjective:     Chief Complaint: Follow-up (F/u procedure 8/19/24 C/O pain level 8,states procedure didn't help,Taking pain meds)    Referred by: No ref. provider found     History of Present Illness: Marnie Briggs is a 73 y.o. female presents today for a postop follow-up of pain related to lumbar degenerative disc disease with radiculopathy after completing a right L4 + left L5 transforaminal epidural steroid injection on 08/19/2024.  Unfortunately she did not have any pain relief from this injection.  Her pain is will elevate to a 10/10 on the NRS with sitting, standing, household chores and also causes her insomnia she uses a variety of treatments for her pain at home including icy hot 10s unit that will alternate gabapentin 300 mg in the morning and 600 mg at night.  Tylenol daily and Lidoderm patches 12 hours on and 12 hours off.  She also takes Lortab 5 mg every morning and at bedtime.  This still does not provide sustainable pain relief.  Her pain is located to bilateral low back with radiation to bilateral hips and radiation into bilateral posterior thighs down her leg.  She has pertinent findings of her MRI lumbar spine at L5-S1 that shows a disc bulge and facet hypertrophy.  No significant spinal canal stenosis.  Mild-to-moderate right and severe left neural foraminal stenosis.  Her pain will lower very slightly when she changes positions and uses heat alternating with ice and a 10s unit.  Her pain score today is 8/10 on the NRS.     Patient was originally seen as a new consult by my supervising physician  , on 07/02/2024 for right-sided sciatica.  Patient has a history of prior cervical fusion and no prior lumbar surgery  During her original consult, she relayed the following:She complains of low back pain that has been present since she fell in the 1980s.  She has not undergone any lumbar spine surgery, although she did have cervical spine surgery several years ago.   She currently rates her pain as 8/10 on the NRS.  It gets down to 5/10 at best.  She finished a course of physical therapy about 1 month ago, which helped somewhat while she was going to it.  She is trying to continue doing the exercises and stretches at home.  She does recall undergoing some injections in her back a long time ago, but does not remember any details about it.  Pain radiates down the bilateral lower extremities into the posterior thighs.  She also reports numbness and tingling in her feet.        Vital signs:   Visit Vitals  BP (!) 152/86 (BP Location: Left arm, Patient Position: Sitting, BP Method: Large (Automatic))   Pulse 64   Ht 5' (1.524 m)   Wt 73 kg (161 lb)   BMI 31.44 kg/m²      Vitals:    09/05/24 1013   PainSc:   8     Pain Disability Index (PDI): 42       Interventional Pain History  08/19/2024:  Right L4 +left L5 TFESI    ROS: Back and leg pain   MRI lumbar spine 2024:  FINDINGS:  There is grade 1 anterolisthesis of L4 over L5.  The vertebral body heights are maintained.  There is no bone marrow edema.     The conus terminates at the level of L1.  It is normal in signal and contour.  There is no epidural fluid collection.     Disc spaces, spinal canal and neural foramina are as follows:     L1-L2: No disc herniation.  No significant spinal canal or neural foraminal stenosis.     L2-L3: Disc bulge and facet hypertrophy with mild narrowing of the spinal canal.  Mild bilateral foraminal stenosis.     L3-L4: Disc bulge and facet hypertrophy with mild narrowing of the spinal canal.  Mild bilateral foraminal stenosis.     L4-L5: Grade 1 anterolisthesis of L4 over L5 with disc bulge, facet hypertrophy and moderate to severe narrowing of the spinal canal.  Moderate to severe right and moderate left neural foraminal stenosis.     L5-S1: Disc bulge and facet hypertrophy.  No significant spinal canal stenosis.  Mild-to-moderate right and severe left neural foraminal stenosis.     No significant  abnormality within the visualized paraspinous musculature.     Impression:     1. Moderate to severe degenerative spinal canal stenosis at L4-5, mild at L2-L4.  2. Multilevel foraminal stenoses as described.        Electronically signed by:Hawa Montiel  Date:                                            06/05/2024  Time:                                           12:44           Exam Ended: 06/05/24 12:36 CDT                 Objective:        Physical Exam  General: Well developed; normal weight; A&O x 3; No anxiety/depression; NAD  Mental Status: Oriented to person, palce and time. Displays appropriate mood & affect.  Head: Norm cephalic and atraumatic  Neck:  No cervical paraspinal banding.  Full range of motion with lateral turning and cervical flexion +extension.  Eyes: normal conjunctiva, normal lids, normal pupils  ENT and mouth: normal external ear, nose, and no lesions noted on the lips.  Respiratory: Symmetrical, Unlabored. No dyspnea  CV: normal rhythm and rate. No peripheral edema.   Abdomen: Non-distended    Extremities:  Gen: No cyanosis or tenderness to palpation bilateral upper and lower extremities  Skin: Warm, pink, dry, no rashes, no lesions on the lumbar spine  Strength: 5/5 motor strength bilateral upper and lower extremities  ROM: Full ROM in bilateral knees and ankles without pain or instability.    Neuro:  Gait: no altalgic lean, normal toe and heel raise. Independent ambulator.  DTR's: 2+ in bilateral patellar, and ankle  Sensory: Intact to light touch bilateral  upper and lower extremities    Spine: Normal lordosis. No scoliosis  L-spine ROM: limited and painful ROM to flexion, extension, bilateral rotation,   Straight Leg Raise:  +bilaterally  SI Joint: No tenderness to palpation bilaterally.      Assessment:     Marnie Briggs is a 73 y.o. female presents today for a postop follow-up of pain related to lumbar degenerative disc disease with radiculopathy after completing a right L4 + left  L5 transforaminal epidural steroid injection on 08/19/2024.  Unfortunately she did not have any pain relief from this injection.  Her pain is will elevate to a 10/10 on the NRS with sitting, standing, household chores and also causes her insomnia she uses a variety of treatments for her pain at home including icy hot 10s unit that will alternate gabapentin 300 mg in the morning and 600 mg at night.  Tylenol daily and Lidoderm patches 12 hours on and 12 hours off.  She also takes Lortab 5 mg every morning and at bedtime.  This still does not provide sustainable pain relief.  Her pain is located to bilateral low back with radiation to bilateral hips and radiation into bilateral posterior thighs down her leg.  She has pertinent findings of her MRI lumbar spine at L5-S1 that shows a disc bulge and facet hypertrophy.  No significant spinal canal stenosis.  Mild-to-moderate right and severe left neural foraminal stenosis.  Her pain will lower very slightly when she changes positions and uses heat alternating with ice and a 10s unit.  Her pain score today is 8/10 on the NRS.    Plan:   Follow up in 2.5 months to eval back pain with bilateral sciatica  Future considerations for bilateral L5 or S1  Unable to swim, thus water Tx not prescribed.  Did not Rx new pain RX as she has CKD    Encounter Diagnoses   Name Primary?    DDD (degenerative disc disease), lumbar Yes    Lumbar radiculopathy     Lumbar spondylosis          Plan:       Marnie was seen today for follow-up.    Diagnoses and all orders for this visit:    DDD (degenerative disc disease), lumbar    Lumbar radiculopathy    Lumbar spondylosis           Past Medical History:   Diagnosis Date    CKD (chronic kidney disease) stage 2, GFR 60-89 ml/min     DM (diabetes mellitus)     HLD (hyperlipidemia)     HTN (hypertension)        Past Surgical History:   Procedure Laterality Date    ANKLE FUSION Right     CATARACT EXTRACTION Bilateral     CHOLECYSTECTOMY      COLONOSCOPY   11/30/2015    DILATION AND CURETTAGE OF UTERUS      HYSTERECTOMY      NECK SURGERY      stint      Right kidney    TRANSFORAMINAL EPIDURAL INJECTION OF STEROID Bilateral 8/19/2024    Procedure: INJECTION, STEROID, EPIDURAL, TRANSFORAMINAL APPROACH  ///Rt L4 & Lt L5;  Surgeon: Marycruz Atwood MD;  Location: [facility] OR;  Service: Pain Management;  Laterality: Bilateral;  TFESI Rt L4 & Lt L5    TUBAL LIGATION         Family History   Problem Relation Name Age of Onset    Diabetes Mother      Stroke Mother      Hypertension Father      Diabetes Father      Heart disease Father      Cancer Brother      Diabetes Brother         Social History     Socioeconomic History    Marital status:     Number of children: 4   Occupational History    Occupation: disabled   Tobacco Use    Smoking status: Never     Passive exposure: Never    Smokeless tobacco: Never   Substance and Sexual Activity    Alcohol use: Not Currently    Drug use: Never    Sexual activity: Not Currently     Partners: Male     Social Determinants of Health     Financial Resource Strain: Low Risk  (7/10/2024)    Overall Financial Resource Strain (CARDIA)     Difficulty of Paying Living Expenses: Not hard at all   Food Insecurity: No Food Insecurity (7/10/2024)    Hunger Vital Sign     Worried About Running Out of Food in the Last Year: Never true     Ran Out of Food in the Last Year: Never true   Transportation Needs: No Transportation Needs (7/10/2024)    TRANSPORTATION NEEDS     Transportation : No   Physical Activity: Inactive (7/9/2024)    Exercise Vital Sign     Days of Exercise per Week: 0 days     Minutes of Exercise per Session: 0 min   Stress: No Stress Concern Present (7/10/2024)    [employer] - Occupational Stress Questionnaire     Feeling of Stress : Not at all   Housing Stability: Low Risk  (7/10/2024)    Housing Stability Vital Sign     Unable to Pay for Housing in the Last Year: No     Homeless in the Last Year:  No       Current Outpatient Medications   Medication Sig Dispense Refill    acetaminophen (TYLENOL) 500 MG tablet Take 500 mg by mouth every 6 (six) hours as needed for Pain.      aspirin 81 MG Chew Take 81 mg by mouth once daily at 6am.      atorvastatin (LIPITOR) 80 MG tablet Take 1 tablet (80 mg total) by mouth once daily. 90 tablet 3    blood sugar diagnostic Strp 1 strip by Misc.(Non-Drug; Combo Route) route 2 (two) times a day. To check BG 2 times daily (AM morning fasting, and 2 hours after dinner), to use with insurance preferred meter 99 each 2    carvediloL (COREG) 12.5 MG tablet Take 1 tablet (12.5 mg total) by mouth 2 (two) times daily. 180 tablet 3    clobetasol 0.05% (TEMOVATE) 0.05 % Oint Apply nightly x1 month, then every other night x1 month, then 2x/week going forward 60 g 6    cyanocobalamin, vitamin B-12, (VITAMIN B-12) 50 mcg tablet Take 50 mcg by mouth once daily.      dicyclomine (BENTYL) 20 mg tablet Take 1 tablet (20 mg total) by mouth 2 (two) times daily as needed (diarrhea). 20 tablet 0    diphenhydrAMINE (BENADRYL) 25 mg capsule Take 1 capsule (25 mg total) by mouth every 12 (twelve) hours as needed for Itching or Allergies. 12 capsule 0    gabapentin (NEURONTIN) 300 MG capsule Take 1 capsule (300 mg total) by mouth As instructed (back pain). 300mg in the morning and 300mg in the afternoon, take 600mg at night      lancets (LANCETS,THIN) Misc 1 Needle by Misc.(Non-Drug; Combo Route) route 2 (two) times a day. 100 each 5    LANTUS SOLOSTAR U-100 INSULIN glargine 100 units/mL SubQ pen Inject 26 Units into the skin once daily. 23.4 mL 2    LIDOcaine (LIDODERM) 5 % Place 1 patch onto the skin every 24 hours. Remove & Discard patch within 12 hours or as directed by MD      lisinopriL 10 MG tablet Take 1 tablet (10 mg total) by mouth once daily. 90 tablet 3    loratadine (CLARITIN) 10 mg tablet Take 10 mg by mouth once daily.      metFORMIN (GLUCOPHAGE) 1000 MG tablet Take 1 tablet (1,000 mg  "total) by mouth 2 (two) times daily with meals. 180 tablet 2    multivitamin capsule Take 1 capsule by mouth once daily.      ondansetron (ZOFRAN) 4 MG tablet Take 1 tablet (4 mg total) by mouth every 6 (six) hours. 12 tablet 0    pen needle, diabetic 29 gauge x 1/2" Ndle   Insulin pen needles, See Instructions, Inuslin pen needles for once a day Lantus injection  E11.65, # 90 EA, 3 Refill(s), Pharmacy: [id number], 162, cm, Height/Length Dosing, 12/10/21 9:34:00 CST, 90.2, kg, Weight Dosing, 12/10/21 9:34:00 CST      semaglutide (OZEMPIC) 0.25 mg or 0.5 mg (2 mg/3 mL) pen injector Inject 0.25 mg into the skin every 7 days. 3 mL 5    vitamin D (VITAMIN D3) 1000 units Tab Take 1,000 Units by mouth once daily.       No current facility-administered medications for this visit.       Review of patient's allergies indicates:  No Known Allergies          "

## 2024-09-17 ENCOUNTER — TELEPHONE (OUTPATIENT)
Dept: FAMILY MEDICINE | Facility: CLINIC | Age: 74
End: 2024-09-17
Payer: MEDICARE

## 2024-09-18 DIAGNOSIS — M54.41 CHRONIC RIGHT-SIDED LOW BACK PAIN WITH RIGHT-SIDED SCIATICA: Chronic | ICD-10-CM

## 2024-09-18 DIAGNOSIS — G89.29 CHRONIC RIGHT-SIDED LOW BACK PAIN WITH RIGHT-SIDED SCIATICA: Chronic | ICD-10-CM

## 2024-09-18 DIAGNOSIS — M51.36 LUMBAR DEGENERATIVE DISC DISEASE: Primary | ICD-10-CM

## 2024-09-18 RX ORDER — HYDROCODONE BITARTRATE AND ACETAMINOPHEN 5; 325 MG/1; MG/1
1 TABLET ORAL EVERY 12 HOURS PRN
Qty: 60 TABLET | Refills: 0 | Status: SHIPPED | OUTPATIENT
Start: 2024-09-18

## 2024-09-18 NOTE — TELEPHONE ENCOUNTER
Attempted pain management with Dr. Atwood, had epidural steroid injection to back on 8/19 with no relief, has since followed up with pain management since then with no additional recommendations at that time.  Patient requesting refill for norco as still with 10/10 pain.   Will refill as prior.      Kenzie Blackman MD  Attending - Family Medicine / Geriatric Medicine  [facility] [place]

## 2024-09-27 ENCOUNTER — OFFICE VISIT (OUTPATIENT)
Dept: OPHTHALMOLOGY | Facility: CLINIC | Age: 74
End: 2024-09-27
Payer: MEDICARE

## 2024-09-27 VITALS — HEIGHT: 60 IN | BODY MASS INDEX: 31.6 KG/M2 | WEIGHT: 160.94 LBS

## 2024-09-27 DIAGNOSIS — E11.9 TYPE 2 DIABETES MELLITUS WITHOUT RETINOPATHY: Primary | ICD-10-CM

## 2024-09-27 DIAGNOSIS — Z96.1 PSEUDOPHAKIA, BOTH EYES: ICD-10-CM

## 2024-09-27 DIAGNOSIS — H33.102 RETINOSCHISIS OF LEFT EYE: ICD-10-CM

## 2024-09-27 DIAGNOSIS — H35.372 EPIRETINAL MEMBRANE (ERM) OF LEFT EYE: ICD-10-CM

## 2024-09-27 PROCEDURE — 99213 OFFICE O/P EST LOW 20 MIN: CPT | Mod: PBBFAC,PN

## 2024-09-27 NOTE — PROGRESS NOTES
HPI    RTC 4 mo DFE, Epiretinal membrane (ERM) of left eye  Patient reports vision stable/no changes  Last edited by Samantha Bourgeois on 9/27/2024  9:50 AM.            Assessment /Plan     For exam results, see Encounter Report.    There are no diagnoses linked to this encounter.    OCT mac 11/21/22  OD: , intact foveal contour that appears excavated as previously noted, but no IRF/SRF  OS: , temporal ERM parafoveally with schisis of foveal contour and parafoveal IRF     OCT mac 7/2023  OD: , normal/slightly excavated foveal contour  OS: , foveoschisis with parafoveal IRF     OCT MAC 11/27/23  OD: , excavated foveal contour  OS: , foveoschisis with parafoveal IRF    OCT Mac 9/27/24  OD: , excavated foveal contour  OS: , foveoschisis with parafoveal IRF, stable from prior    OCT RNFL 9/27/24  OD 86 S yellow T white, rest green (not correlated to exam)  OS 83 all green     Assessment /Plan     1. 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 ERM with foveoschisis OS  - seen with Dr. Martin 11/19/21: believes this is ERM with foveoschisis rather than a partial thickness hole. No treatment indicated for now as vision good and patient having no symptoms  - 5/12/23 with hyperopic shift likely exacerbation of macular pathology. RTC June-July to see Dr. Martin and discuss possible need for PPV/EMP  - 7/21/23: OCT stable. VA 20/25-2. Discussed RBA of surgery. Will defer for now as VA good.   - 11/27/23: OCT stable. BCVA 20/20 OU, mrx today. Will CTM.   -  9/27/24: OCT stable BCVA 20/25 // 20/25-2, will continue to monitor    3. Pseudophakia OU  4. PCO, non-visually significant, right eye  - NVS, given repeat MRX today but retinal etiology    RTC 6 months OCT and DFE

## 2024-09-30 ENCOUNTER — OFFICE VISIT (OUTPATIENT)
Dept: GYNECOLOGY | Facility: CLINIC | Age: 74
End: 2024-09-30
Payer: MEDICARE

## 2024-09-30 VITALS
OXYGEN SATURATION: 100 % | RESPIRATION RATE: 20 BRPM | TEMPERATURE: 98 F | HEIGHT: 64 IN | WEIGHT: 160.81 LBS | BODY MASS INDEX: 27.45 KG/M2 | HEART RATE: 81 BPM | DIASTOLIC BLOOD PRESSURE: 77 MMHG | SYSTOLIC BLOOD PRESSURE: 148 MMHG

## 2024-09-30 DIAGNOSIS — Z01.419 ENCOUNTER FOR ANNUAL ROUTINE GYNECOLOGICAL EXAMINATION: Primary | ICD-10-CM

## 2024-09-30 DIAGNOSIS — Z12.31 VISIT FOR SCREENING MAMMOGRAM: ICD-10-CM

## 2024-09-30 DIAGNOSIS — L90.0 LICHEN SCLEROSUS ET ATROPHICUS: ICD-10-CM

## 2024-09-30 PROCEDURE — 3078F DIAST BP <80 MM HG: CPT | Mod: CPTII,,, | Performed by: NURSE PRACTITIONER

## 2024-09-30 PROCEDURE — 4010F ACE/ARB THERAPY RXD/TAKEN: CPT | Mod: CPTII,,, | Performed by: NURSE PRACTITIONER

## 2024-09-30 PROCEDURE — 1159F MED LIST DOCD IN RCRD: CPT | Mod: CPTII,,, | Performed by: NURSE PRACTITIONER

## 2024-09-30 PROCEDURE — 3052F HG A1C>EQUAL 8.0%<EQUAL 9.0%: CPT | Mod: CPTII,,, | Performed by: NURSE PRACTITIONER

## 2024-09-30 PROCEDURE — 99215 OFFICE O/P EST HI 40 MIN: CPT | Mod: PBBFAC | Performed by: NURSE PRACTITIONER

## 2024-09-30 PROCEDURE — 3066F NEPHROPATHY DOC TX: CPT | Mod: CPTII,,, | Performed by: NURSE PRACTITIONER

## 2024-09-30 PROCEDURE — 3288F FALL RISK ASSESSMENT DOCD: CPT | Mod: CPTII,,, | Performed by: NURSE PRACTITIONER

## 2024-09-30 PROCEDURE — G0101 CA SCREEN;PELVIC/BREAST EXAM: HCPCS | Mod: PBBFAC | Performed by: NURSE PRACTITIONER

## 2024-09-30 PROCEDURE — 3061F NEG MICROALBUMINURIA REV: CPT | Mod: CPTII,,, | Performed by: NURSE PRACTITIONER

## 2024-09-30 PROCEDURE — 1101F PT FALLS ASSESS-DOCD LE1/YR: CPT | Mod: CPTII,,, | Performed by: NURSE PRACTITIONER

## 2024-09-30 PROCEDURE — G0101 CA SCREEN;PELVIC/BREAST EXAM: HCPCS | Mod: S$PBB,,, | Performed by: NURSE PRACTITIONER

## 2024-09-30 PROCEDURE — 3077F SYST BP >= 140 MM HG: CPT | Mod: CPTII,,, | Performed by: NURSE PRACTITIONER

## 2024-09-30 RX ORDER — CLOBETASOL PROPIONATE 0.5 MG/G
OINTMENT TOPICAL
Qty: 60 G | Refills: 6 | Status: SHIPPED | OUTPATIENT
Start: 2024-09-30

## 2024-09-30 NOTE — PROGRESS NOTES
"  [facility] -  Gynecology / Women's Health Clinic    Subjective:       Patient ID: Marnie Briggs is a 74 y.o. female.    Chief Complaint:  Gynecologic Exam    History of Present Illness  Pt is  (1 stillbirth) here for annual GYN exam. Denies hx of abnormal pap smears. Hx of hysterectomy in  secondary to uterine fibroids. Denies any hot flashes. Denies any vaginal bleeding or discharge. Denies any abdominal or pelvic pain. Denies any urinary or breast complaints. Last MG-23-BIRADS 1. Denies any family hx of breast, uterine, or ovarian cancer. Pt has a hx of lichen sclerosus to the vaginal area and bilateral breast, diagnosed by dermatology with punch biopsy pathology report on 2015 was significant for lichen sclerosis. Denies itching, irritation, she is no longer using Clobetasol PRN. Denies fly hx of breast, ovarian, uterine or colon cancer. Cologuard neg in . [id number]. No complaints today.     GYN & OB History  No LMP recorded. Patient has had a hysterectomy.   Date of Last Pap: No result found    Review of patient's allergies indicates:  No Known Allergies  Past Medical History:   Diagnosis Date    CKD (chronic kidney disease) stage 2, GFR 60-89 ml/min     DM (diabetes mellitus)     HLD (hyperlipidemia)     HTN (hypertension)      OB History    Para Term  AB Living   7 5     1 4   SAB IAB Ectopic Multiple Live Births   1       4      # Outcome Date GA Lbr Blayne/2nd Weight Sex Type Anes PTL Lv   7 Para            6       Vag-Spont   JORGE ALBERTO   5 SAB            4 Para         FD   3 Para      Vag-Spont   JORGE ALBERTO   2 Para      Vag-Spont   JORGE ALBERTO   1 Para      Vag-Spont   JORGE ALBERTO        Review of Systems  Review of Systems    Negative except for pertinent findings for positives per HPI     Objective:    Physical Exam    BP (!) 148/77 (BP Location: Left arm, Patient Position: Sitting)   Pulse 81   Temp 97.8 °F (36.6 °C) (Oral)   Resp 20   Ht 5' 4" (1.626 m)   Wt " 72.9 kg (160 lb 12.8 oz)   SpO2 100%   BMI 27.60 kg/m²   GENERAL: Alert and oriented x3. No apparent distress.  BREAST: No mass, tenderness or discharge. milan hypopigmented areas approx 6 o'clock.  PELVIC:   Labia: Milan labia majora with hypopigmented skin down to rectal area, agglutination of labia minora, no excoriation or lesions.  Vagina: pale, cystocele not past introitus.  Cervix: Surgically absent  Uterus: Surgically absent  Adnexa: Non-tender, no fullness.  INTEGUMENTARY: Warm and dry.  NEUROLOGIC: She is alert and oriented x3.   PSYCHIATRIC: Cooperative, appropriate mood and affect.     Assessment:         ICD-10-CM ICD-9-CM   1. Encounter for annual routine gynecological examination  Z01.419 V72.31   2. Visit for screening mammogram  Z12.31 V76.12   3. Lichen sclerosus et atrophicus  L90.0 701.0     Plan:   Marnie was seen today for gynecologic exam.    Diagnoses and all orders for this visit:    Encounter for annual routine gynecological examination    Visit for screening mammogram  -     Mammo Digital Screening Bilat w/ Lexx; Future    Lichen sclerosus et atrophicus  -     clobetasol 0.05% (TEMOVATE) 0.05 % Oint; Apply to affected external vaginal area 2x/week    Pelvic today, pap deferred d/t hysterectomy  MG ordered  Clobetasol 2x/week, no hot baths.  Follow up in about 1 year (around 9/30/2025) for annual exam.

## 2024-10-03 ENCOUNTER — OFFICE VISIT (OUTPATIENT)
Dept: FAMILY MEDICINE | Facility: CLINIC | Age: 74
End: 2024-10-03
Payer: MEDICARE

## 2024-10-03 ENCOUNTER — LAB VISIT (OUTPATIENT)
Dept: LAB | Facility: HOSPITAL | Age: 74
End: 2024-10-03
Payer: MEDICARE

## 2024-10-03 VITALS
RESPIRATION RATE: 18 BRPM | HEART RATE: 76 BPM | DIASTOLIC BLOOD PRESSURE: 84 MMHG | BODY MASS INDEX: 27.21 KG/M2 | SYSTOLIC BLOOD PRESSURE: 137 MMHG | TEMPERATURE: 98 F | OXYGEN SATURATION: 98 % | WEIGHT: 159.38 LBS | HEIGHT: 64 IN

## 2024-10-03 DIAGNOSIS — N18.2 TYPE 2 DIABETES MELLITUS WITH STAGE 2 CHRONIC KIDNEY DISEASE, WITH LONG-TERM CURRENT USE OF INSULIN: Chronic | ICD-10-CM

## 2024-10-03 DIAGNOSIS — M51.369 LUMBAR DEGENERATIVE DISC DISEASE: ICD-10-CM

## 2024-10-03 DIAGNOSIS — N18.2 STAGE 2 CHRONIC KIDNEY DISEASE DUE TO TYPE 2 DIABETES MELLITUS: Chronic | ICD-10-CM

## 2024-10-03 DIAGNOSIS — E11.22 TYPE 2 DIABETES MELLITUS WITH STAGE 2 CHRONIC KIDNEY DISEASE, WITH LONG-TERM CURRENT USE OF INSULIN: Chronic | ICD-10-CM

## 2024-10-03 DIAGNOSIS — N18.9 CHRONIC KIDNEY DISEASE, UNSPECIFIED CKD STAGE: ICD-10-CM

## 2024-10-03 DIAGNOSIS — Z79.4 TYPE 2 DIABETES MELLITUS WITH STAGE 2 CHRONIC KIDNEY DISEASE, WITH LONG-TERM CURRENT USE OF INSULIN: Chronic | ICD-10-CM

## 2024-10-03 DIAGNOSIS — Z23 IMMUNIZATION DUE: ICD-10-CM

## 2024-10-03 DIAGNOSIS — G89.29 CHRONIC RIGHT-SIDED LOW BACK PAIN WITH RIGHT-SIDED SCIATICA: Primary | Chronic | ICD-10-CM

## 2024-10-03 DIAGNOSIS — M54.41 CHRONIC RIGHT-SIDED LOW BACK PAIN WITH RIGHT-SIDED SCIATICA: Primary | Chronic | ICD-10-CM

## 2024-10-03 DIAGNOSIS — E11.22 STAGE 2 CHRONIC KIDNEY DISEASE DUE TO TYPE 2 DIABETES MELLITUS: Chronic | ICD-10-CM

## 2024-10-03 LAB
ALBUMIN SERPL-MCNC: 3.9 G/DL (ref 3.4–4.8)
ALBUMIN/GLOB SERPL: 1.3 RATIO (ref 1.1–2)
ALP SERPL-CCNC: 56 UNIT/L (ref 40–150)
ALT SERPL-CCNC: 15 UNIT/L (ref 0–55)
ANION GAP SERPL CALC-SCNC: 5 MEQ/L
ANION GAP SERPL CALC-SCNC: 9 MEQ/L
AST SERPL-CCNC: 18 UNIT/L (ref 5–34)
BACTERIA #/AREA URNS AUTO: ABNORMAL /HPF
BILIRUB SERPL-MCNC: 0.9 MG/DL
BILIRUB UR QL STRIP.AUTO: NEGATIVE
BUN SERPL-MCNC: 15.6 MG/DL (ref 9.8–20.1)
BUN SERPL-MCNC: 15.6 MG/DL (ref 9.8–20.1)
CALCIUM SERPL-MCNC: 9.1 MG/DL (ref 8.4–10.2)
CALCIUM SERPL-MCNC: 9.4 MG/DL (ref 8.4–10.2)
CHLORIDE SERPL-SCNC: 103 MMOL/L (ref 98–107)
CHLORIDE SERPL-SCNC: 103 MMOL/L (ref 98–107)
CLARITY UR: CLEAR
CO2 SERPL-SCNC: 29 MMOL/L (ref 23–31)
CO2 SERPL-SCNC: 32 MMOL/L (ref 23–31)
COLOR UR AUTO: ABNORMAL
CREAT SERPL-MCNC: 1.01 MG/DL (ref 0.55–1.02)
CREAT SERPL-MCNC: 1.01 MG/DL (ref 0.55–1.02)
CREAT UR-MCNC: 129.6 MG/DL (ref 45–106)
CREAT/UREA NIT SERPL: 15
CREAT/UREA NIT SERPL: 15
GFR SERPLBLD CREATININE-BSD FMLA CKD-EPI: 59 ML/MIN/1.73/M2
GFR SERPLBLD CREATININE-BSD FMLA CKD-EPI: 59 ML/MIN/1.73/M2
GLOBULIN SER-MCNC: 3.1 GM/DL (ref 2.4–3.5)
GLUCOSE SERPL-MCNC: 124 MG/DL (ref 82–115)
GLUCOSE SERPL-MCNC: 133 MG/DL (ref 82–115)
GLUCOSE UR QL STRIP: NORMAL
HGB UR QL STRIP: NEGATIVE
HYALINE CASTS #/AREA URNS LPF: ABNORMAL /LPF
KETONES UR QL STRIP: NEGATIVE
LEUKOCYTE ESTERASE UR QL STRIP: 25
MAGNESIUM SERPL-MCNC: 1.4 MG/DL (ref 1.6–2.6)
MICROALBUMIN UR-MCNC: 7.6 UG/ML
MICROALBUMIN/CREAT RATIO PNL UR: 5.9 MG/GM CR (ref 0–30)
MUCOUS THREADS URNS QL MICRO: ABNORMAL /LPF
NITRITE UR QL STRIP: NEGATIVE
PH UR STRIP: 5.5 [PH]
PHOSPHATE SERPL-MCNC: 4 MG/DL (ref 2.3–4.7)
POTASSIUM SERPL-SCNC: 3.9 MMOL/L (ref 3.5–5.1)
POTASSIUM SERPL-SCNC: 4 MMOL/L (ref 3.5–5.1)
PROT SERPL-MCNC: 7 GM/DL (ref 5.8–7.6)
PROT UR QL STRIP: NEGATIVE
PTH-INTACT SERPL-MCNC: 35.3 PG/ML (ref 8.7–77)
RBC #/AREA URNS AUTO: ABNORMAL /HPF
SODIUM SERPL-SCNC: 140 MMOL/L (ref 136–145)
SODIUM SERPL-SCNC: 141 MMOL/L (ref 136–145)
SP GR UR STRIP.AUTO: 1.02 (ref 1–1.03)
SQUAMOUS #/AREA URNS LPF: ABNORMAL /HPF
UROBILINOGEN UR STRIP-ACNC: NORMAL
WBC #/AREA URNS AUTO: ABNORMAL /HPF

## 2024-10-03 PROCEDURE — 83970 ASSAY OF PARATHORMONE: CPT

## 2024-10-03 PROCEDURE — 84100 ASSAY OF PHOSPHORUS: CPT

## 2024-10-03 PROCEDURE — 99215 OFFICE O/P EST HI 40 MIN: CPT | Mod: PBBFAC | Performed by: FAMILY MEDICINE

## 2024-10-03 PROCEDURE — 82043 UR ALBUMIN QUANTITATIVE: CPT

## 2024-10-03 PROCEDURE — 80053 COMPREHEN METABOLIC PANEL: CPT

## 2024-10-03 PROCEDURE — 82570 ASSAY OF URINE CREATININE: CPT

## 2024-10-03 PROCEDURE — 36415 COLL VENOUS BLD VENIPUNCTURE: CPT | Performed by: FAMILY MEDICINE

## 2024-10-03 PROCEDURE — 36415 COLL VENOUS BLD VENIPUNCTURE: CPT

## 2024-10-03 PROCEDURE — 83735 ASSAY OF MAGNESIUM: CPT

## 2024-10-03 PROCEDURE — 81001 URINALYSIS AUTO W/SCOPE: CPT

## 2024-10-03 RX ORDER — LIDOCAINE 50 MG/G
1 PATCH TOPICAL DAILY
Qty: 30 PATCH | Refills: 2 | Status: CANCELLED | OUTPATIENT
Start: 2024-10-03

## 2024-10-03 RX ORDER — LIDOCAINE 50 MG/G
1 PATCH TOPICAL DAILY
Qty: 30 PATCH | Refills: 5 | Status: SHIPPED | OUTPATIENT
Start: 2024-10-03

## 2024-10-03 RX ORDER — METFORMIN HYDROCHLORIDE 1000 MG/1
1000 TABLET ORAL 2 TIMES DAILY WITH MEALS
Qty: 180 TABLET | Refills: 2 | Status: CANCELLED | OUTPATIENT
Start: 2024-10-03

## 2024-10-03 RX ORDER — METFORMIN HYDROCHLORIDE 1000 MG/1
1000 TABLET ORAL 2 TIMES DAILY WITH MEALS
Qty: 60 TABLET | Refills: 2 | Status: SHIPPED | OUTPATIENT
Start: 2024-10-03

## 2024-10-03 RX ORDER — HYDROCODONE BITARTRATE AND ACETAMINOPHEN 5; 325 MG/1; MG/1
1 TABLET ORAL EVERY 12 HOURS PRN
Qty: 60 TABLET | Refills: 0 | Status: SHIPPED | OUTPATIENT
Start: 2024-10-03
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
## 2024-10-04 ENCOUNTER — OFFICE VISIT (OUTPATIENT)
Dept: NEPHROLOGY | Facility: CLINIC | Age: 74
End: 2024-10-04
Payer: MEDICARE

## 2024-10-04 VITALS
HEART RATE: 74 BPM | BODY MASS INDEX: 26.95 KG/M2 | TEMPERATURE: 98 F | HEIGHT: 64 IN | WEIGHT: 157.88 LBS | OXYGEN SATURATION: 98 % | SYSTOLIC BLOOD PRESSURE: 135 MMHG | RESPIRATION RATE: 20 BRPM | DIASTOLIC BLOOD PRESSURE: 89 MMHG

## 2024-10-04 DIAGNOSIS — N18.2 TYPE 2 DIABETES MELLITUS WITH STAGE 2 CHRONIC KIDNEY DISEASE, WITHOUT LONG-TERM CURRENT USE OF INSULIN: ICD-10-CM

## 2024-10-04 DIAGNOSIS — N18.2 STAGE 2 CHRONIC KIDNEY DISEASE DUE TO TYPE 2 DIABETES MELLITUS: Primary | ICD-10-CM

## 2024-10-04 DIAGNOSIS — E11.22 STAGE 2 CHRONIC KIDNEY DISEASE DUE TO TYPE 2 DIABETES MELLITUS: Primary | ICD-10-CM

## 2024-10-04 DIAGNOSIS — N18.9 CHRONIC KIDNEY DISEASE, UNSPECIFIED CKD STAGE: ICD-10-CM

## 2024-10-04 DIAGNOSIS — E11.22 TYPE 2 DIABETES MELLITUS WITH STAGE 2 CHRONIC KIDNEY DISEASE, WITHOUT LONG-TERM CURRENT USE OF INSULIN: ICD-10-CM

## 2024-10-04 DIAGNOSIS — I10 ESSENTIAL HYPERTENSION: ICD-10-CM

## 2024-10-04 PROCEDURE — 99214 OFFICE O/P EST MOD 30 MIN: CPT | Mod: PBBFAC | Performed by: INTERNAL MEDICINE

## 2024-10-04 NOTE — PROGRESS NOTES
"[facility] NEPHROLOGY  OUTPATIENT OFFICE VISIT NOTE    SUBJECTIVE:      HPI: Ms. Briggs is a 71-year-old  female with past medical history of diabetes mellitus type 2 (diagnosed in her 20s), chronic kidney disease, hypertension, dyslipidemia, and obesity.  Presents for follow-up appointment in Nephrology Clinic.     Patient reports feeling well since her last visit. She reports good control of her blood pressure and her blood sugars at home. She had mild elevation in BP in clinic 135/89 but reports her BP is usually more 117s when she is at home. Patient reports she is no longer taking meloxicam for her back pain. Instead, she is receiving injections which as helped along with Norco 5 PRN for pain. Explained to take her medications with caution as they can build up due to her renal dysfunction which the patient understands. c    ROS:  Constitutional: no fever, fatigue, weakness  Respiratory: no cough, no wheezing, no shortness of breath  Cardiovascular: no chest pain, no palpitations, no edema  Gastrointestinal: no nausea, vomiting, or diarrhea. No abdominal pain  Genitourinary: no dysuria, no urinary frequency or urgency, no hematuria  Musculoskeletal: +chronic lower back/leg pain  Integumentary: no skin rash or abnormal lesion  Neurologic: no headache, no dizziness, no weakness or numbness    OBJECTIVE:     Vital signs:   /89 (BP Location: Right arm, Patient Position: Sitting)   Pulse 74   Temp 97.7 °F (36.5 °C) (Oral)   Resp 20   Ht 5' 4" (1.626 m)   Wt 71.6 kg (157 lb 13.6 oz)   SpO2 98%   BMI 27.09 kg/m²      Physical Exam  Constitutional:       General: She is not in acute distress.  Cardiovascular:      Rate and Rhythm: Normal rate.      Pulses: Normal pulses.      Heart sounds: No murmur heard.     No friction rub. No gallop.   Pulmonary:      Breath sounds: No wheezing, rhonchi or rales.   Abdominal:      General: There is no distension.      Palpations: Abdomen is soft.      " Tenderness: There is no abdominal tenderness. There is no right CVA tenderness or left CVA tenderness.      Comments: No suprapubic tenderness   Skin:     General: Skin is warm and dry.   Neurological:      General: No focal deficit present.      Mental Status: She is alert. Mental status is at baseline.       ASSESSMENT & PLAN:     CKD Stage IIIb  - BUN/Cr displayed interval decline since last appointment with eGFR now at 40  - Urine protein/creatinine ratio WNL  - Vit D 56.9, intact PTH 35.3  - follow up in 5 months with labs prior  - Continue:    -follow 2 g a day dietary sodium restriction    -controlled diabetes (goal A1c less than 7%)    -control high blood pressure (goal blood pressure is less than 130/80, please check blood pressure twice a week and bring blood pressure logs to office visit)    -exercise at least 30 minutes a day, 5 days a week    -maintain healthy weight    -decrease or stop alcohol use    -do not smoke    -stay well hydrated (drink water only, avoid juices, sweet tea, and sodas)    -ask about staying up-to-date on vaccinations (flu vaccine, pneumonia vaccine, hepatitis B vaccine)    -avoid excessive use of NSAIDs (ibuprofen, naproxen, Aleve, Advil, Toradol, Mobic), take Tylenol as needed for headache or mild pain    -take cholesterol lowering medications if prescribed (LDL goal less than 100)    Diabetes Mellitus type II  - A1c 8.0% on 8/22/24. Well controlled for age  - Continue on diabetic regimen per PCP   - Educated on healthy diet and exercise     HTN  - /89 today in clinic  - Continue on Lisinopril and coreg  - Educated on DASH diet and exercise     HLD  - Continue to monitor, LDL at goal <70  - Last LDL 33    Obesity  - Encouraged healthy low fat diet and exercise   - Continue to monitor     Return to clinic in 5 months    Sharan Romo MD  [facility] Internal Medicine, HO[facility]III    /

## 2024-11-21 DIAGNOSIS — M51.369 LUMBAR DEGENERATIVE DISC DISEASE: ICD-10-CM

## 2024-11-21 DIAGNOSIS — M54.41 CHRONIC RIGHT-SIDED LOW BACK PAIN WITH RIGHT-SIDED SCIATICA: Chronic | ICD-10-CM

## 2024-11-21 DIAGNOSIS — G89.29 CHRONIC RIGHT-SIDED LOW BACK PAIN WITH RIGHT-SIDED SCIATICA: Chronic | ICD-10-CM

## 2024-11-21 RX ORDER — HYDROCODONE BITARTRATE AND ACETAMINOPHEN 5; 325 MG/1; MG/1
1 TABLET ORAL EVERY 12 HOURS PRN
Qty: 60 TABLET | Refills: 0 | Status: SHIPPED | OUTPATIENT
Start: 2024-11-21

## 2024-11-26 ENCOUNTER — OFFICE VISIT (OUTPATIENT)
Facility: CLINIC | Age: 74
End: 2024-11-26
Payer: MEDICARE

## 2024-11-26 VITALS
BODY MASS INDEX: 26.95 KG/M2 | SYSTOLIC BLOOD PRESSURE: 131 MMHG | HEIGHT: 64 IN | HEART RATE: 77 BPM | DIASTOLIC BLOOD PRESSURE: 76 MMHG | WEIGHT: 157.88 LBS | TEMPERATURE: 98 F

## 2024-11-26 DIAGNOSIS — M54.41 CHRONIC BILATERAL LOW BACK PAIN WITH BILATERAL SCIATICA: Primary | ICD-10-CM

## 2024-11-26 DIAGNOSIS — M47.816 LUMBAR SPONDYLOSIS: ICD-10-CM

## 2024-11-26 DIAGNOSIS — M54.2 CERVICALGIA: ICD-10-CM

## 2024-11-26 DIAGNOSIS — M79.601 BILATERAL ARM PAIN: ICD-10-CM

## 2024-11-26 DIAGNOSIS — M54.42 CHRONIC BILATERAL LOW BACK PAIN WITH BILATERAL SCIATICA: Primary | ICD-10-CM

## 2024-11-26 DIAGNOSIS — M54.16 LUMBAR RADICULOPATHY: ICD-10-CM

## 2024-11-26 DIAGNOSIS — M79.602 BILATERAL ARM PAIN: ICD-10-CM

## 2024-11-26 DIAGNOSIS — G89.29 CHRONIC BILATERAL LOW BACK PAIN WITH BILATERAL SCIATICA: Primary | ICD-10-CM

## 2024-11-26 PROCEDURE — 1101F PT FALLS ASSESS-DOCD LE1/YR: CPT | Mod: CPTII,,, | Performed by: NURSE PRACTITIONER

## 2024-11-26 PROCEDURE — 1125F AMNT PAIN NOTED PAIN PRSNT: CPT | Mod: CPTII,,, | Performed by: NURSE PRACTITIONER

## 2024-11-26 PROCEDURE — 4010F ACE/ARB THERAPY RXD/TAKEN: CPT | Mod: CPTII,,, | Performed by: NURSE PRACTITIONER

## 2024-11-26 PROCEDURE — 3008F BODY MASS INDEX DOCD: CPT | Mod: CPTII,,, | Performed by: NURSE PRACTITIONER

## 2024-11-26 PROCEDURE — 3078F DIAST BP <80 MM HG: CPT | Mod: CPTII,,, | Performed by: NURSE PRACTITIONER

## 2024-11-26 PROCEDURE — 3075F SYST BP GE 130 - 139MM HG: CPT | Mod: CPTII,,, | Performed by: NURSE PRACTITIONER

## 2024-11-26 PROCEDURE — 99214 OFFICE O/P EST MOD 30 MIN: CPT | Mod: ,,, | Performed by: NURSE PRACTITIONER

## 2024-11-26 PROCEDURE — 3052F HG A1C>EQUAL 8.0%<EQUAL 9.0%: CPT | Mod: CPTII,,, | Performed by: NURSE PRACTITIONER

## 2024-11-26 PROCEDURE — 3066F NEPHROPATHY DOC TX: CPT | Mod: CPTII,,, | Performed by: NURSE PRACTITIONER

## 2024-11-26 PROCEDURE — 3288F FALL RISK ASSESSMENT DOCD: CPT | Mod: CPTII,,, | Performed by: NURSE PRACTITIONER

## 2024-11-26 PROCEDURE — 1159F MED LIST DOCD IN RCRD: CPT | Mod: CPTII,,, | Performed by: NURSE PRACTITIONER

## 2024-11-26 PROCEDURE — 3061F NEG MICROALBUMINURIA REV: CPT | Mod: CPTII,,, | Performed by: NURSE PRACTITIONER

## 2024-11-26 NOTE — PROGRESS NOTES
[facility]    Subjective:     Chief Complaint: Low-back Pain (2.5 month f/u, no P.T., tylenol and norco for relief, pain level 10/10)    Referred by: No ref. provider found     History of Present Illness: Marnie Briggs is a 74 y.o. female presents today as a 2-1/2 month follow-up for back pain with bilateral sciatica.  The plan during her last office visit with me included future considerations for bilateral L5 or S1.  Patient was unable to swim thus water aerobics were not prescribed.  Additionally I did not prescribe any pain medications as she has CKD.  This visit, patient was accompanied by her daughter.  She would like to address her neck bilateral shoulder and arm pain as well as her bilateral low back pain with bilateral sciatica.  She denies any new injuries.  She also has a history of cervical spine surgery.  Her pain remains located to bilateral low back and bilateral legs that radiate in a posterior pattern into her toes bilaterally.  She has a long standing history of diabetes mellitus that has now controlled over the years that has not always been that way.  Unclear per conversation if she has peripheral neuropathy from diabetes.  Daughter is not sure if she has this latter diagnosis.  However she does have numbness to but both of her feet and has no history of chemotherapy or other medications that would cause peripheral neuropathy.  Her back pain with bilateral sciatica will elevate to a 7/10 anytime she walks for prolonged periods of time performs chores standing too long.  This pain is described as achy, burning and stabbing.  This also causes insomnia.  Her pain will reduce if she does chores in intervals and only stands for short periods of time.  She currently takes gabapentin 300 mg in the morning and 600 mg at night.  Additionally she takes Tylenol 2000 mg daily.  She has also tried heat massage this exacerbated her symptoms.  She completed physical therapy for her low back and  sciatica pain during the summer with 12 sessions.  This exacerbated her pain.  She has also used heat massage which also exacerbates this area of her body's pain.  Patient states her low back to leg pain ratio is a 50 50 split.  She also has some chronic neck pain to bilateral neck that radiates to bilateral shoulders and down bilateral arms and hands.  She does notice that she is dropping items occasionally.  She has no urinary retention however she has some intermittent fecal incontinence.  She denies spasms to her hands or toes.  She has no saddle anesthesia.  She does not have an MRI of her cervical spine.  She has not completed physical therapy for cervicalgia.  She also reports having possible carpal tunnel syndrome as her bilateral thumbs and adjacent to fingers go numb.  This pain will elevate to a 10/10 with overhead lifting and using her arms more as well as turning his neck side-to-side.  Again she does have history of a cervical spine surgery.  Patient does not have a current cervical MRI.  She has a cervical CT in EMR only.  She is interested in starting physical therapy to her neck and bilateral and following in 8 weeks.            History of Present Illness during patient's last office visit with me on 09/05/2024 included the following :   Marnie Briggs is a 73 y.o. female presents today for a postop follow-up of pain related to lumbar degenerative disc disease with radiculopathy after completing a right L4 + left L5 transforaminal epidural steroid injection on 08/19/2024.  Unfortunately she did not have any pain relief from this injection.  Her pain is will elevate to a 10/10 on the NRS with sitting, standing, household chores and also causes her insomnia she uses a variety of treatments for her pain at home including icy hot 10s unit that will alternate gabapentin 300 mg in the morning and 600 mg at night.  Tylenol daily and Lidoderm patches 12 hours on and 12 hours off.  She also takes Lortab 5 mg  "every morning and at bedtime.  This still does not provide sustainable pain relief.  Her pain is located to bilateral low back with radiation to bilateral hips and radiation into bilateral posterior thighs down her leg.  She has pertinent findings of her MRI lumbar spine at L5-S1 that shows a disc bulge and facet hypertrophy.  No significant spinal canal stenosis.  Mild-to-moderate right and severe left neural foraminal stenosis.  Her pain will lower very slightly when she changes positions and uses heat alternating with ice and a 10s unit.  Her pain score today is 8/10 on the NRS.      Patient was originally seen as a new consult by my supervising physician  , on 07/02/2024 for right-sided sciatica.  Patient has a history of prior cervical fusion and no prior lumbar surgery  During her original consult, she relayed the following:She complains of low back pain that has been present since she fell in the 1980s.  She has not undergone any lumbar spine surgery, although she did have cervical spine surgery several years ago.  She currently rates her pain as 8/10 on the NRS.  It gets down to 5/10 at best.  She finished a course of physical therapy about 1 month ago, which helped somewhat while she was going to it.  She is trying to continue doing the exercises and stretches at home.  She does recall undergoing some injections in her back a long time ago, but does not remember any details about it.  Pain radiates down the bilateral lower extremities into the posterior thighs.  She also reports numbness and tingling in her feet.    Pertinent PMH:  CKD stage 2, diabetes mellitus, hyperlipidemia, hypertension  Pertinent PSH:  Neck surgery, ankle fusion right, bilateral cataract extraction, no spinal cord stimulator, pacemaker or defibrillator                    Vital signs:   Visit Vitals  /76 (BP Location: Right arm, Patient Position: Sitting)   Pulse 77   Temp 98.2 °F (36.8 °C) (Oral)   Ht 5' 4" (1.626 m) "   Wt 71.6 kg (157 lb 13.6 oz)   BMI 27.09 kg/m²      Vitals:    11/26/24 0829   PainSc: 10-Worst pain ever     Pain Disability Index (PDI): 58       Interventional Pain History  08/19/2024:  Right L4 +left L5 TFESI (received 2 weeks of pain relief)         ROS: Back and leg pain +neck, bilateral shoulder and arm pain      Cervical MRI:   (none obtained)    Cervical CT  (Loc[facility] in [facility])      MRI lumbar spine 2024:  FINDINGS:  There is grade 1 anterolisthesis of L4 over L5.  The vertebral body heights are maintained.  There is no bone marrow edema.     The conus terminates at the level of L1.  It is normal in signal and contour.  There is no epidural fluid collection.     Disc spaces, spinal canal and neural foramina are as follows:     L1-L2: No disc herniation.  No significant spinal canal or neural foraminal stenosis.     L2-L3: Disc bulge and facet hypertrophy with mild narrowing of the spinal canal.  Mild bilateral foraminal stenosis.     L3-L4: Disc bulge and facet hypertrophy with mild narrowing of the spinal canal.  Mild bilateral foraminal stenosis.     L4-L5: Grade 1 anterolisthesis of L4 over L5 with disc bulge, facet hypertrophy and moderate to severe narrowing of the spinal canal.  Moderate to severe right and moderate left neural foraminal stenosis.     L5-S1: Disc bulge and facet hypertrophy.  No significant spinal canal stenosis.  Mild-to-moderate right and severe left neural foraminal stenosis.     No significant abnormality within the visualized paraspinous musculature.     Impression:     1. Moderate to severe degenerative spinal canal stenosis at L4-5, mild at L2-L4.  2. Multilevel foraminal stenoses as described.        Electronically signed by:Hawa Montiel  Date:                                            06/05/2024  Time:                                           12:44              Objective:        Physical Exam  General: Well developed; normal weight; A&O x 3; No anxiety/depression;  NAD  Mental Status: Oriented to person, palce and time. Displays appropriate mood & affect.  Head: Norm cephalic and atraumatic  Neck:  No cervical paraspinal banding.  Full range of motion with lateral turning and cervical flexion +extension.  Eyes: normal conjunctiva, normal lids, normal pupils  ENT and mouth: normal external ear, nose, and no lesions noted on the lips.  Respiratory: Symmetrical, Unlabored. No dyspnea  CV: normal rhythm and rate. No peripheral edema.   Abdomen: Non-distended     Extremities:  Gen: No cyanosis or tenderness to palpation bilateral upper and lower extremities  Skin: Warm, pink, dry, no rashes, no lesions on the lumbar spine  Strength: 5/5 motor strength bilateral upper and lower extremities  ROM: Full ROM in bilateral knees and ankles without pain or instability.     Neuro:  Gait: no altalgic lean, normal toe and heel raise. Independent ambulator.  DTR's: 2+ in bilateral patellar, and ankle  Sensory: Intact to light touch bilateral  upper and lower extremities     Spine: Normal lordosis. No scoliosis  L-spine ROM: limited and painful ROM to flexion, extension, bilateral rotation,   Straight Leg Raise:  +bilaterally  SI Joint: No tenderness to palpation bilaterally.                Assessment:     Marnie Briggs is a 74 y.o. female presents today as a 2-1/2 month follow-up for back pain with bilateral sciatica.  The plan during her last office visit with me included future considerations for bilateral L5 or S1.  Patient was unable to swim thus water aerobics were not prescribed.  Additionally I did not prescribe any pain medications as she has CKD.  This visit, patient was accompanied by her daughter.  She would like to address her neck bilateral shoulder and arm pain as well as her bilateral low back pain with bilateral sciatica.  She denies any new injuries.  She also has a history of cervical spine surgery.  Her pain remains located to bilateral low back and bilateral legs that  radiate in a posterior pattern into her toes bilaterally.  She has a long standing history of diabetes mellitus that has now controlled over the years that has not always been that way.  Unclear per conversation if she has peripheral neuropathy from diabetes.  Daughter is not sure if she has this latter diagnosis.  However she does have numbness to but both of her feet and has no history of chemotherapy or other medications that would cause peripheral neuropathy.  Her back pain with bilateral sciatica will elevate to a 7/10 anytime she walks for prolonged periods of time performs chores standing too long.  This pain is described as achy, burning and stabbing.  This also causes insomnia.  Her pain will reduce if she does chores in intervals and only stands for short periods of time.  She currently takes gabapentin 300 mg in the morning and 600 mg at night.  Additionally she takes Tylenol 2000 mg daily.  She has also tried heat massage this exacerbated her symptoms.  She completed physical therapy for her low back and sciatica pain during the summer with 12 sessions.  This exacerbated her pain.  She has also used heat massage which also exacerbates this area of her body's pain.  Patient states her low back to leg pain ratio is a 50 50 split.  She also has some chronic neck pain to bilateral neck that radiates to bilateral shoulders and down bilateral arms and hands.  She does notice that she is dropping items occasionally.  She has no urinary retention however she has some intermittent fecal incontinence.  She denies spasms to her hands or toes.  She has no saddle anesthesia.  She does not have an MRI of her cervical spine.  She has not completed physical therapy for cervicalgia.  She also reports having possible carpal tunnel syndrome as her bilateral thumbs and adjacent to fingers go numb.  This pain will elevate to a 10/10 with overhead lifting and using her arms more as well as turning his neck side-to-side.   Again she does have history of a cervical spine surgery.  Patient does not have a current cervical MRI.  She has a cervical CT in EMR only.  She is interested in starting physical therapy to her neck and bilateral and following in 8 weeks.  Plan of care:   PT order placed for cervicalgia with bilateral arm pain 10 weeks.  Follow-up in 8 weeks to evaluate effectiveness physical therapy with cervicalgia.  Patient will contemplate if she is interested in a bilateral S1 transforaminal epidural steroid injection.      Encounter Diagnoses   Name Primary?    Chronic bilateral low back pain with bilateral sciatica Yes    Lumbar radiculopathy     Lumbar spondylosis          Plan:       Marnie was seen today for low-back pain.    Diagnoses and all orders for this visit:    Chronic bilateral low back pain with bilateral sciatica    Lumbar radiculopathy    Lumbar spondylosis           Past Medical History:   Diagnosis Date    CKD (chronic kidney disease) stage 2, GFR 60-89 ml/min     DM (diabetes mellitus)     HLD (hyperlipidemia)     HTN (hypertension)        Past Surgical History:   Procedure Laterality Date    ANKLE FUSION Right     CATARACT EXTRACTION Bilateral     CHOLECYSTECTOMY      COLONOSCOPY  11/30/2015    DILATION AND CURETTAGE OF UTERUS      HYSTERECTOMY      NECK SURGERY      stint      Right kidney    TRANSFORAMINAL EPIDURAL INJECTION OF STEROID Bilateral 8/19/2024    Procedure: INJECTION, STEROID, EPIDURAL, TRANSFORAMINAL APPROACH  ///Rt L4 & Lt L5;  Surgeon: Marycruz Atwood MD;  Location: [facility] OR;  Service: Pain Management;  Laterality: Bilateral;  TFESI Rt L4 & Lt L5    TUBAL LIGATION         Family History   Problem Relation Name Age of Onset    Diabetes Mother      Stroke Mother      Hypertension Father      Diabetes Father      Heart disease Father      Cancer Brother      Diabetes Brother         Social History     Socioeconomic History    Marital status:     Number of children: 4   Occupational  History    Occupation: disabled   Tobacco Use    Smoking status: Never     Passive exposure: Never    Smokeless tobacco: Never   Substance and Sexual Activity    Alcohol use: Not Currently    Drug use: Never    Sexual activity: Not Currently     Partners: Male     Social Drivers of Health     Financial Resource Strain: Low Risk  (7/10/2024)    Overall Financial Resource Strain (CARDIA)     Difficulty of Paying Living Expenses: Not hard at all   Food Insecurity: No Food Insecurity (7/10/2024)    Hunger Vital Sign     Worried About Running Out of Food in the Last Year: Never true     Ran Out of Food in the Last Year: Never true   Transportation Needs: No Transportation Needs (7/10/2024)    TRANSPORTATION NEEDS     Transportation : No   Physical Activity: Inactive (7/9/2024)    Exercise Vital Sign     Days of Exercise per Week: 0 days     Minutes of Exercise per Session: 0 min   Stress: No Stress Concern Present (7/10/2024)    [employer] - Occupational Stress Questionnaire     Feeling of Stress : Not at all   Housing Stability: Low Risk  (7/10/2024)    Housing Stability Vital Sign     Unable to Pay for Housing in the Last Year: No     Homeless in the Last Year: No       Current Outpatient Medications   Medication Sig Dispense Refill    acetaminophen (TYLENOL) 500 MG tablet Take 500 mg by mouth every 6 (six) hours as needed for Pain.      aspirin 81 MG Chew Take 81 mg by mouth once daily at 6am.      atorvastatin (LIPITOR) 80 MG tablet Take 1 tablet (80 mg total) by mouth once daily. 90 tablet 3    blood sugar diagnostic Strp 1 strip by Misc.(Non-Drug; Combo Route) route 2 (two) times a day. To check BG 2 times daily (AM morning fasting, and 2 hours after dinner), to use with insurance preferred meter 99 each 2    carvediloL (COREG) 12.5 MG tablet Take 1 tablet (12.5 mg total) by mouth 2 (two) times daily. 180 tablet 3    clobetasol 0.05% (TEMOVATE) 0.05 % Oint Apply to affected external  "vaginal area 2x/week 60 g 6    cyanocobalamin, vitamin B-12, (VITAMIN B-12) 50 mcg tablet Take 50 mcg by mouth once daily.      HYDROcodone-acetaminophen (NORCO) 5-325 mg per tablet Take 1 tablet by mouth every 12 (twelve) hours as needed for Pain. 60 tablet 0    lancets (LANCETS,THIN) Misc 1 Needle by Misc.(Non-Drug; Combo Route) route 2 (two) times a day. 100 each 5    LANTUS SOLOSTAR U-100 INSULIN glargine 100 units/mL SubQ pen Inject 26 Units into the skin once daily. 23.4 mL 2    LIDOcaine (LIDODERM) 5 % Place 1 patch onto the skin once daily. Remove & Discard patch within 12 hours or as directed by MD 30 patch 5    lisinopriL 10 MG tablet Take 1 tablet (10 mg total) by mouth once daily. 90 tablet 3    loratadine (CLARITIN) 10 mg tablet Take 10 mg by mouth once daily.      metFORMIN (GLUCOPHAGE) 1000 MG tablet Take 1 tablet (1,000 mg total) by mouth 2 (two) times daily with meals. 60 tablet 2    multivitamin capsule Take 1 capsule by mouth once daily.      pen needle, diabetic 29 gauge x 1/2" Ndle   Insulin pen needles, See Instructions, [place] pen needles for once a day Lantus injection  E11.65, # 90 EA, 3 Refill(s), Pharmacy: [id number], 162, cm, Height/Length Dosing, 12/10/21 9:34:00 CST, 90.2, kg, Weight Dosing, 12/10/21 9:34:00 CST      semaglutide (OZEMPIC) 0.25 mg or 0.5 mg (2 mg/3 mL) pen injector Inject 0.25 mg into the skin every 7 days. 3 mL 5    vitamin D (VITAMIN D3) 1000 units Tab Take 1,000 Units by mouth once daily.      gabapentin (NEURONTIN) 300 MG capsule Take 1 capsule (300 mg total) by mouth As instructed (back pain). 300mg in the morning and 300mg in the afternoon, take 600mg at night       No current facility-administered medications for this visit.       Review of patient's allergies indicates:  No Known Allergies          "

## 2024-12-05 ENCOUNTER — HOSPITAL ENCOUNTER (OUTPATIENT)
Dept: RADIOLOGY | Facility: HOSPITAL | Age: 74
Discharge: HOME | End: 2024-12-05
Attending: NURSE PRACTITIONER
Payer: MEDICARE

## 2024-12-05 DIAGNOSIS — Z12.31 VISIT FOR SCREENING MAMMOGRAM: ICD-10-CM

## 2024-12-05 PROCEDURE — 77067 SCR MAMMO BI INCL CAD: CPT | Mod: 26,,, | Performed by: RADIOLOGY

## 2024-12-05 PROCEDURE — 77067 SCR MAMMO BI INCL CAD: CPT | Mod: TC

## 2024-12-05 PROCEDURE — 77063 BREAST TOMOSYNTHESIS BI: CPT | Mod: 26,,, | Performed by: RADIOLOGY

## 2024-12-19 DIAGNOSIS — M51.369 LUMBAR DEGENERATIVE DISC DISEASE: ICD-10-CM

## 2024-12-19 DIAGNOSIS — G89.29 CHRONIC RIGHT-SIDED LOW BACK PAIN WITH RIGHT-SIDED SCIATICA: Chronic | ICD-10-CM

## 2024-12-19 DIAGNOSIS — M54.41 CHRONIC RIGHT-SIDED LOW BACK PAIN WITH RIGHT-SIDED SCIATICA: Chronic | ICD-10-CM

## 2024-12-19 RX ORDER — HYDROCODONE BITARTRATE AND ACETAMINOPHEN 5; 325 MG/1; MG/1
1 TABLET ORAL EVERY 12 HOURS PRN
Qty: 60 TABLET | Refills: 0 | Status: SHIPPED | OUTPATIENT
Start: 2024-12-20

## 2025-01-08 ENCOUNTER — OFFICE VISIT (OUTPATIENT)
Dept: FAMILY MEDICINE | Facility: CLINIC | Age: 75
End: 2025-01-08
Payer: MEDICARE

## 2025-01-08 VITALS
SYSTOLIC BLOOD PRESSURE: 124 MMHG | DIASTOLIC BLOOD PRESSURE: 84 MMHG | BODY MASS INDEX: 26.42 KG/M2 | TEMPERATURE: 98 F | OXYGEN SATURATION: 99 % | WEIGHT: 154.75 LBS | HEIGHT: 64 IN | HEART RATE: 74 BPM

## 2025-01-08 DIAGNOSIS — Z79.4 TYPE 2 DIABETES MELLITUS WITH STAGE 2 CHRONIC KIDNEY DISEASE, WITH LONG-TERM CURRENT USE OF INSULIN: ICD-10-CM

## 2025-01-08 DIAGNOSIS — M48.061 FORAMINAL STENOSIS OF LUMBAR REGION: ICD-10-CM

## 2025-01-08 DIAGNOSIS — M51.369 BULGING LUMBAR DISC: ICD-10-CM

## 2025-01-08 DIAGNOSIS — M54.41 CHRONIC RIGHT-SIDED LOW BACK PAIN WITH RIGHT-SIDED SCIATICA: ICD-10-CM

## 2025-01-08 DIAGNOSIS — G89.29 CHRONIC RIGHT-SIDED LOW BACK PAIN WITH RIGHT-SIDED SCIATICA: ICD-10-CM

## 2025-01-08 DIAGNOSIS — R15.9 INCONTINENCE OF FECES, UNSPECIFIED FECAL INCONTINENCE TYPE: ICD-10-CM

## 2025-01-08 DIAGNOSIS — M51.362 DEGENERATION OF INTERVERTEBRAL DISC OF LUMBAR REGION WITH DISCOGENIC BACK PAIN AND LOWER EXTREMITY PAIN: ICD-10-CM

## 2025-01-08 DIAGNOSIS — E11.22 TYPE 2 DIABETES MELLITUS WITH STAGE 2 CHRONIC KIDNEY DISEASE, WITH LONG-TERM CURRENT USE OF INSULIN: ICD-10-CM

## 2025-01-08 DIAGNOSIS — N18.9 CHRONIC KIDNEY DISEASE, UNSPECIFIED CKD STAGE: ICD-10-CM

## 2025-01-08 DIAGNOSIS — N18.2 TYPE 2 DIABETES MELLITUS WITH STAGE 2 CHRONIC KIDNEY DISEASE, WITH LONG-TERM CURRENT USE OF INSULIN: ICD-10-CM

## 2025-01-08 DIAGNOSIS — R19.7 DIARRHEA, UNSPECIFIED TYPE: Primary | ICD-10-CM

## 2025-01-08 LAB
EST. AVERAGE GLUCOSE BLD GHB EST-MCNC: 168.6 MG/DL
HBA1C MFR BLD: 7.5 %

## 2025-01-08 PROCEDURE — 36415 COLL VENOUS BLD VENIPUNCTURE: CPT | Performed by: FAMILY MEDICINE

## 2025-01-08 PROCEDURE — 83036 HEMOGLOBIN GLYCOSYLATED A1C: CPT | Performed by: FAMILY MEDICINE

## 2025-01-08 PROCEDURE — 99215 OFFICE O/P EST HI 40 MIN: CPT | Mod: PBBFAC | Performed by: FAMILY MEDICINE

## 2025-01-08 NOTE — PATIENT INSTRUCTIONS
STOP METFORMIN completely for the next 2-3 weeks until you are done with this ozempic pen box.     Monitor your diarrhea if it gets better while off the metformin.     If not, once this ozempic box is done, do not get a refill and stop the ozempic.  Then see if the diarrhia better, this may take 2 weeks to notice a difference.     We will verify the insurance will cover the MRI , and they will call to schedule, if you dont hear for scheduling in 2 weeks, please call us and let us know.     After the MRI we will refer you to neurosurgery, we will try to find a place accepted by your insurance.     Geriatric nurse line [phone]
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
## 2025-01-10 ENCOUNTER — TELEPHONE (OUTPATIENT)
Dept: NEUROSURGERY | Facility: CLINIC | Age: 75
End: 2025-01-10
Payer: MEDICARE

## 2025-01-10 NOTE — TELEPHONE ENCOUNTER
Patient referred to clinic by Dr. Chris Ferreira for Incontinence of feces, unspecified fecal incontinence type.     MRI Lumbar Spine 6/5/24. Impression:  Moderate to severe degenerative spinal canal stenosis at L4-5, mild at L2-L4.  Multilevel foraminal stenoses as described.    Per referring 1/8/25 progress note:  - Reports 6 month duration of diarrhea, has not previously brought this up to PCP  - Episodes were intermittent 2-3x a week  - Stool is not completely water but has semi solid components  - Recently episodes have worsened, frequency same but patient now with urgency and occasionally not able to make it to the toilet and soils herself  - Also reports that past x2 week, has had new onset incontinence of bowel where she has not realized she needed to go until she has already soiled herself  - Still denies any saddle anesthesia or urinary incontinence    Per pain management 11/26/24 progress note:  Her back pain with bilateral sciatica will elevate to a 7/10 anytime she walks for prolonged periods of time performs chores standing too long. This pain is described as achy, burning and stabbing. This also causes insomnia. Her pain will reduce if she does chores in intervals and only stands for short periods of time. She currently takes gabapentin 300 mg in the morning and 600 mg at night. Additionally she takes Tylenol 2000 mg daily. She has also tried heat massage this exacerbated her symptoms. She completed physical therapy for her low back and sciatica pain during the summer with 12 sessions. This exacerbated her pain. She has also used heat massage which also exacerbates this area of her body's pain. Patient states her low back to leg pain ratio is a 50 50 split.     Please review and advise. Will further assess symptoms upon scheduling.

## 2025-01-16 ENCOUNTER — OFFICE VISIT (OUTPATIENT)
Dept: NEUROSURGERY | Facility: CLINIC | Age: 75
End: 2025-01-16
Payer: MEDICARE

## 2025-01-16 ENCOUNTER — HOSPITAL ENCOUNTER (OUTPATIENT)
Dept: RADIOLOGY | Facility: HOSPITAL | Age: 75
Discharge: HOME | End: 2025-01-16
Attending: NURSE PRACTITIONER
Payer: MEDICARE

## 2025-01-16 VITALS
HEART RATE: 72 BPM | WEIGHT: 160 LBS | HEIGHT: 63 IN | DIASTOLIC BLOOD PRESSURE: 80 MMHG | RESPIRATION RATE: 16 BRPM | BODY MASS INDEX: 28.35 KG/M2 | SYSTOLIC BLOOD PRESSURE: 125 MMHG

## 2025-01-16 DIAGNOSIS — R15.9 INCONTINENCE OF FECES, UNSPECIFIED FECAL INCONTINENCE TYPE: ICD-10-CM

## 2025-01-16 DIAGNOSIS — M54.9 BACK PAIN, UNSPECIFIED BACK LOCATION, UNSPECIFIED BACK PAIN LATERALITY, UNSPECIFIED CHRONICITY: ICD-10-CM

## 2025-01-16 DIAGNOSIS — G95.9 CERVICAL MYELOPATHY WITH CERVICAL RADICULOPATHY: ICD-10-CM

## 2025-01-16 DIAGNOSIS — M48.062 LUMBAR STENOSIS WITH NEUROGENIC CLAUDICATION: ICD-10-CM

## 2025-01-16 DIAGNOSIS — M54.12 CERVICAL MYELOPATHY WITH CERVICAL RADICULOPATHY: ICD-10-CM

## 2025-01-16 DIAGNOSIS — M43.16 SPONDYLOLISTHESIS OF LUMBAR REGION: Primary | ICD-10-CM

## 2025-01-16 PROCEDURE — 3008F BODY MASS INDEX DOCD: CPT | Mod: CPTII,,, | Performed by: PHYSICIAN ASSISTANT

## 2025-01-16 PROCEDURE — 3288F FALL RISK ASSESSMENT DOCD: CPT | Mod: CPTII,,, | Performed by: PHYSICIAN ASSISTANT

## 2025-01-16 PROCEDURE — 1125F AMNT PAIN NOTED PAIN PRSNT: CPT | Mod: CPTII,,, | Performed by: PHYSICIAN ASSISTANT

## 2025-01-16 PROCEDURE — 3074F SYST BP LT 130 MM HG: CPT | Mod: CPTII,,, | Performed by: PHYSICIAN ASSISTANT

## 2025-01-16 PROCEDURE — 99204 OFFICE O/P NEW MOD 45 MIN: CPT | Mod: ,,, | Performed by: PHYSICIAN ASSISTANT

## 2025-01-16 PROCEDURE — 1159F MED LIST DOCD IN RCRD: CPT | Mod: CPTII,,, | Performed by: PHYSICIAN ASSISTANT

## 2025-01-16 PROCEDURE — 72110 X-RAY EXAM L-2 SPINE 4/>VWS: CPT | Mod: TC

## 2025-01-16 PROCEDURE — 3051F HG A1C>EQUAL 7.0%<8.0%: CPT | Mod: CPTII,,, | Performed by: PHYSICIAN ASSISTANT

## 2025-01-16 PROCEDURE — 1101F PT FALLS ASSESS-DOCD LE1/YR: CPT | Mod: CPTII,,, | Performed by: PHYSICIAN ASSISTANT

## 2025-01-16 PROCEDURE — 3079F DIAST BP 80-89 MM HG: CPT | Mod: CPTII,,, | Performed by: PHYSICIAN ASSISTANT

## 2025-01-16 PROCEDURE — 1160F RVW MEDS BY RX/DR IN RCRD: CPT | Mod: CPTII,,, | Performed by: PHYSICIAN ASSISTANT

## 2025-01-16 RX ORDER — DIAZEPAM 10 MG/1
10 TABLET ORAL
Qty: 2 TABLET | Refills: 0 | Status: SHIPPED | OUTPATIENT
Start: 2025-01-16 | End: 2025-02-15
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
## 2025-01-24 DIAGNOSIS — M54.41 CHRONIC RIGHT-SIDED LOW BACK PAIN WITH RIGHT-SIDED SCIATICA: Chronic | ICD-10-CM

## 2025-01-24 DIAGNOSIS — M51.369 LUMBAR DEGENERATIVE DISC DISEASE: ICD-10-CM

## 2025-01-24 DIAGNOSIS — G89.29 CHRONIC RIGHT-SIDED LOW BACK PAIN WITH RIGHT-SIDED SCIATICA: Chronic | ICD-10-CM

## 2025-01-24 RX ORDER — HYDROCODONE BITARTRATE AND ACETAMINOPHEN 5; 325 MG/1; MG/1
1 TABLET ORAL EVERY 12 HOURS PRN
Qty: 60 TABLET | Refills: 0 | Status: SHIPPED | OUTPATIENT
Start: 2025-01-24

## 2025-01-31 ENCOUNTER — HOSPITAL ENCOUNTER (OUTPATIENT)
Dept: RADIOLOGY | Facility: HOSPITAL | Age: 75
Discharge: HOME | End: 2025-01-31
Payer: MEDICARE

## 2025-01-31 ENCOUNTER — HOSPITAL ENCOUNTER (OUTPATIENT)
Dept: RADIOLOGY | Facility: HOSPITAL | Age: 75
Discharge: HOME | End: 2025-01-31
Attending: PHYSICIAN ASSISTANT
Payer: MEDICARE

## 2025-01-31 DIAGNOSIS — G95.9 CERVICAL MYELOPATHY WITH CERVICAL RADICULOPATHY: ICD-10-CM

## 2025-01-31 DIAGNOSIS — M54.12 CERVICAL MYELOPATHY WITH CERVICAL RADICULOPATHY: ICD-10-CM

## 2025-01-31 DIAGNOSIS — R15.9 INCONTINENCE OF FECES, UNSPECIFIED FECAL INCONTINENCE TYPE: ICD-10-CM

## 2025-01-31 PROCEDURE — 72052 X-RAY EXAM NECK SPINE 6/>VWS: CPT | Mod: TC

## 2025-01-31 PROCEDURE — 72148 MRI LUMBAR SPINE W/O DYE: CPT | Mod: TC

## 2025-01-31 PROCEDURE — 72141 MRI NECK SPINE W/O DYE: CPT | Mod: TC

## 2025-02-04 ENCOUNTER — OFFICE VISIT (OUTPATIENT)
Dept: NEUROSURGERY | Facility: CLINIC | Age: 75
End: 2025-02-04
Payer: MEDICARE

## 2025-02-04 VITALS
DIASTOLIC BLOOD PRESSURE: 79 MMHG | SYSTOLIC BLOOD PRESSURE: 132 MMHG | BODY MASS INDEX: 28.35 KG/M2 | HEART RATE: 72 BPM | HEIGHT: 63 IN | RESPIRATION RATE: 16 BRPM | WEIGHT: 160 LBS

## 2025-02-04 DIAGNOSIS — M48.02 SPINAL STENOSIS, CERVICAL REGION: ICD-10-CM

## 2025-02-04 DIAGNOSIS — M48.062 LUMBAR STENOSIS WITH NEUROGENIC CLAUDICATION: ICD-10-CM

## 2025-02-04 DIAGNOSIS — M43.16 SPONDYLOLISTHESIS OF LUMBAR REGION: ICD-10-CM

## 2025-02-04 DIAGNOSIS — M48.02 CERVICAL STENOSIS OF SPINAL CANAL: ICD-10-CM

## 2025-02-04 DIAGNOSIS — M47.12 CERVICAL SPONDYLOSIS WITH MYELOPATHY: Primary | ICD-10-CM

## 2025-02-04 PROCEDURE — 3075F SYST BP GE 130 - 139MM HG: CPT | Mod: CPTII,,, | Performed by: PHYSICIAN ASSISTANT

## 2025-02-04 PROCEDURE — 1101F PT FALLS ASSESS-DOCD LE1/YR: CPT | Mod: CPTII,,, | Performed by: PHYSICIAN ASSISTANT

## 2025-02-04 PROCEDURE — 3008F BODY MASS INDEX DOCD: CPT | Mod: CPTII,,, | Performed by: PHYSICIAN ASSISTANT

## 2025-02-04 PROCEDURE — 3051F HG A1C>EQUAL 7.0%<8.0%: CPT | Mod: CPTII,,, | Performed by: PHYSICIAN ASSISTANT

## 2025-02-04 PROCEDURE — 1125F AMNT PAIN NOTED PAIN PRSNT: CPT | Mod: CPTII,,, | Performed by: PHYSICIAN ASSISTANT

## 2025-02-04 PROCEDURE — 3078F DIAST BP <80 MM HG: CPT | Mod: CPTII,,, | Performed by: PHYSICIAN ASSISTANT

## 2025-02-04 PROCEDURE — 99214 OFFICE O/P EST MOD 30 MIN: CPT | Mod: ,,, | Performed by: PHYSICIAN ASSISTANT

## 2025-02-04 PROCEDURE — 1160F RVW MEDS BY RX/DR IN RCRD: CPT | Mod: CPTII,,, | Performed by: PHYSICIAN ASSISTANT

## 2025-02-04 PROCEDURE — 1159F MED LIST DOCD IN RCRD: CPT | Mod: CPTII,,, | Performed by: PHYSICIAN ASSISTANT

## 2025-02-04 PROCEDURE — 3288F FALL RISK ASSESSMENT DOCD: CPT | Mod: CPTII,,, | Performed by: PHYSICIAN ASSISTANT

## 2025-02-04 NOTE — PROGRESS NOTES
[facility]  History & Physical  Neurosurgery      Marnie Briggs   [id number]   1950       SUBJECTIVE:     CHIEF COMPLAINT:  Lower back and leg pain      HPI:  Marnie Briggs is a 74 y.o. female who presents for follow up appointment with cervical MRI and x-ray.  Patient was seen initially by me on 01/16/2025 in regards to her lumbar spine.  Due to hyperreflexia, cervical imaging was ordered.  She returns today to review studies.      The patient's main complaint is of lower back pain with pain radiating into bilateral buttocks and posterior thighs.  With walking, she develops increased lower back and posterior thigh pain as well as weakness in her legs.  She also complains of neck pain with pain radiating into bilateral trapezius muscles and shoulders.  There is numbness in the 1st, 2nd, and 3rd fingers more so on the left than right.      Past Medical History:   Diagnosis Date    CKD (chronic kidney disease) stage 2, GFR 60-89 ml/min     DM (diabetes mellitus)     HLD (hyperlipidemia)     HTN (hypertension)        Past Surgical History:   Procedure Laterality Date    ANKLE FUSION Right     CATARACT EXTRACTION Bilateral     CERVICAL FUSION  2014    C3-4, C4-5 ACDF . Dr. Trujillo    CHOLECYSTECTOMY      COLONOSCOPY  11/30/2015    DILATION AND CURETTAGE OF UTERUS      HYSTERECTOMY      procedure for ureter stenosis      1980's.  Dr. Elkins    STENT, RENAL Right     TRANSFORAMINAL EPIDURAL INJECTION OF STEROID Bilateral 08/19/2024    Procedure: INJECTION, STEROID, EPIDURAL, TRANSFORAMINAL APPROACH  ///Rt L4 & Lt L5;  Surgeon: Marycruz Atwood MD;  Location: [facility];  Service: Pain Management;  Laterality: Bilateral;  TFESI Rt L4 & Lt L5    TUBAL LIGATION         Family History   Problem Relation Name Age of Onset    Diabetes Mother      Stroke Mother      Hypertension Father      Diabetes Father      Heart disease Father      Cancer Brother      Diabetes Brother         Social History      Socioeconomic History    Marital status:     Number of children: 4   Occupational History    Occupation: disabled   Tobacco Use    Smoking status: Never     Passive exposure: Never    Smokeless tobacco: Never   Substance and Sexual Activity    Alcohol use: Not Currently    Drug use: Never    Sexual activity: Not Currently     Partners: Male     Social Drivers of Health     Financial Resource Strain: Low Risk  (7/10/2024)    Overall Financial Resource Strain (CARDIA)     Difficulty of Paying Living Expenses: Not hard at all   Food Insecurity: No Food Insecurity (7/10/2024)    Hunger Vital Sign     Worried About Running Out of Food in the Last Year: Never true     Ran Out of Food in the Last Year: Never true   Transportation Needs: No Transportation Needs (7/10/2024)    TRANSPORTATION NEEDS     Transportation : No   Physical Activity: Inactive (7/9/2024)    Exercise Vital Sign     Days of Exercise per Week: 0 days     Minutes of Exercise per Session: 0 min   Stress: No Stress Concern Present (7/10/2024)    [place] [employer] - Occupational Stress Questionnaire     Feeling of Stress : Not at all   Housing Stability: Low Risk  (7/10/2024)    Housing Stability Vital Sign     Unable to Pay for Housing in the Last Year: No     Homeless in the Last Year: No       Review of patient's allergies indicates:  No Known Allergies     Current Outpatient Medications   Medication Instructions    acetaminophen (TYLENOL) 500 mg, Every 6 hours PRN    aspirin 81 mg, Daily    atorvastatin (LIPITOR) 80 mg, Oral, Daily    carvediloL (COREG) 12.5 mg, Oral, 2 times daily    clobetasol 0.05% (TEMOVATE) 0.05 % Oint Apply to affected external vaginal area 2x/week    cyanocobalamin (vitamin B-12) (VITAMIN B-12) 50 mcg, Daily    gabapentin (NEURONTIN) 300 mg, Oral, See admin instructions, 300mg in the morning and 300mg in the afternoon, take 600mg at night    HYDROcodone-acetaminophen (NORCO) 5-325 mg per tablet 1  "tablet, Oral, Every 12 hours PRN    lancets (LANCETS,THIN) Misc 1 Needle, Misc.(Non-Drug; Combo Route), 2 times daily    LANTUS SOLOSTAR U-100 INSULIN 26 Units, Subcutaneous, Daily    LIDOcaine (LIDODERM) 5 % 1 patch, Transdermal, Daily, Remove & Discard patch within 12 hours or as directed by MD    lisinopriL 10 mg, Oral, Daily    loratadine (CLARITIN) 10 mg, Daily    multivitamin capsule 1 capsule, Daily    pen needle, diabetic 29 gauge x 1/2" Ndle   Insulin pen needles, See Instructions, Inuslin pen needles for once a day Lantus injection  E11.65, # 90 EA, 3 Refill(s), Pharmacy: [id number], 162, cm, Height/Length Dosing, 12/10/21 9:34:00 CST, 90.2, kg, Weight Dosing, 12/10/21 9:34:00 CST    vitamin D (VITAMIN D3) 1,000 Units, Daily        Review of Systems:    Review of Systems   12 point review of systems conducted, negative except as stated in the history of present illness. See HPI for details.      OBJECTIVE:       Visit Vitals  /79 (BP Location: Left arm, Patient Position: Sitting)   Pulse 72   Resp 16   Ht 5' 3" (1.6 m)   Wt 72.6 kg (160 lb)   BMI 28.34 kg/m²        General:  Pleasant, Well-nourished, Well-groomed.    Lungs:  Breathing is quiet with non-labored respirations.    Musculoskeletal:   Cervical ROM:  Moderately limited with extension and bilateral rotation.  Neck pain increases at the end range with bilateral rotation.  Tinel's sign is negative at bilateral wrists and elbows.  Phalen's test is negative bilaterally.  Spurling's is unable to be tested due to restricted range of motion.  Lumbar ROM:  Moderately limited with flexion, severely limited with extension.  Lower back pain increases in both directions.  Straight leg raise is negative bilaterally.  Crossed straight leg raise is negative bilaterally.  Hip rotation is negative bilaterally.     Neurological:  The patient is awake, alert, and oriented in all 4 spheres.  Muscle strength against resistance:  Strength   Deltoids " "Biceps Triceps Wrist Extension Wrist Flexion Intrinsics   Upper: R 5/5 5/5 5/5 5/5   5/5     L 5/5 5/5 5/5 5/5   5/5       Iliopsoas Quadriceps Knee  Flexion Tibialis  anterior Gastro- cnemius EHL   Lower: R 5/5 5/5 5/5 5/5 5/5 5-/5     L 5/5 5/5 5/5 5/5 5/5 5/5   Sensation is intact to primary modalities in bilateral upper and lower extremities with the exception of being diminished along the right median and ulnar distribution as well as right L5 and S1 distribution.  Rolon's sign is positive on the left, negative on the right.  Toes are upgoing on the left, downgoing on the right to Babinski.  There is no clonus at the ankles.  Reflexes:    Right Left   Triceps 2+ 3+   Biceps 2+ 3+   Brachioradialis 2+ 2+   Patellar 2+ 2+   Achilles 1+ 2+   Gait is antalgic.  She is able to walk on heels and toes with difficulty secondary to her balance.    Imaging:  All pertinent neuroimaging independently reviewed. Discussed these findings in detail with the patient.  Cervical x-rays were obtained on 01/31/2025.  Fusion is solid at C3-4 and C4-5.  There is no abnormal motion with flexion-extension.  Alignment is normal.  MRI without contrast of the cervical spine was obtained on the same date.  At C1-2, "there is chronic right ventral epidural soft tissue thickening causing effacement of the right intrathecal sac and flattening of the right ventral cord".  At C2-3, there is disc/osteophyte complex along with facet than uncinate hypertrophy that causes severe central and right foraminal with moderate left foraminal stenosis.  There is impingement of the cord at this level.  There he slight increased signal within cord at this level the left.  C6-7, there is severe bilateral foraminal stenosis secondary to uncinate and facet hypertrophy.  At C7-T1, there is severe bilateral foraminal stenosis secondary to facet hypertrophy.  Bone density from 12/1/2022 is normal.    ASSESSMENT:     1. Cervical spondylosis with myelopathy      "   2. Cervical stenosis of spinal canal        3. Spondylolisthesis of lumbar region        4. Lumbar stenosis with neurogenic claudication        5. Spinal stenosis, cervical region  CT Cervical Spine Without Contrast        PLAN:     Options were discussed at length with the patient.  The lower back and leg symptoms are her main complaint.  She will continue to work with Dr. Atwood in regards to this.  She has severe stenosis at C2-3 with slight increase in signal in the left side of the cord.  We discussed symptoms that she should watch out for to make us aware.  She voiced understanding.  Although she does not want surgery for the lumbar spine due to her age, I have expressed to her there may come a time when she needs to have surgery on her cervical spine.  We are not at that point at this time.  She will return to see Dr. Hernandez.  We will obtain cervical CT prior to that appointment.      A total of 19 minutes was spent face-to-face with the patient during this encounter.  Over half of that time was spent on counseling and coordination of care.  An additional 15+ minutes were used to reviewed the patient's chart including note from Vanessa Goldstein NP, cervical MRI and x-ray images and reports, lumbar MRI images, and work on office note.      Marnie Cullen PA-C      Disclaimer:  This note is prepared using voice recognition software and as such is likely to have errors despite attempts at proofreading.

## 2025-02-05 ENCOUNTER — OFFICE VISIT (OUTPATIENT)
Facility: CLINIC | Age: 75
End: 2025-02-05
Payer: MEDICARE

## 2025-02-05 VITALS
BODY MASS INDEX: 28.36 KG/M2 | WEIGHT: 160.06 LBS | TEMPERATURE: 98 F | SYSTOLIC BLOOD PRESSURE: 155 MMHG | HEART RATE: 68 BPM | HEIGHT: 63 IN | DIASTOLIC BLOOD PRESSURE: 81 MMHG

## 2025-02-05 DIAGNOSIS — M48.02 SPINAL STENOSIS, CERVICAL REGION: ICD-10-CM

## 2025-02-05 DIAGNOSIS — M54.12 CERVICAL RADICULOPATHY: ICD-10-CM

## 2025-02-05 DIAGNOSIS — M48.062 LUMBAR STENOSIS WITH NEUROGENIC CLAUDICATION: ICD-10-CM

## 2025-02-05 DIAGNOSIS — M54.16 LUMBAR RADICULITIS: Primary | ICD-10-CM

## 2025-02-05 DIAGNOSIS — M43.16 SPONDYLOLISTHESIS OF LUMBAR REGION: ICD-10-CM

## 2025-02-05 DIAGNOSIS — M47.12 CERVICAL SPONDYLOSIS WITH MYELOPATHY: ICD-10-CM

## 2025-02-05 DIAGNOSIS — N88.2 CERVICAL STENOSIS (UTERINE CERVIX): ICD-10-CM

## 2025-02-05 PROCEDURE — 99214 OFFICE O/P EST MOD 30 MIN: CPT | Mod: ,,, | Performed by: NURSE PRACTITIONER

## 2025-02-05 PROCEDURE — 1101F PT FALLS ASSESS-DOCD LE1/YR: CPT | Mod: CPTII,,, | Performed by: NURSE PRACTITIONER

## 2025-02-05 PROCEDURE — 3077F SYST BP >= 140 MM HG: CPT | Mod: CPTII,,, | Performed by: NURSE PRACTITIONER

## 2025-02-05 PROCEDURE — 3008F BODY MASS INDEX DOCD: CPT | Mod: CPTII,,, | Performed by: NURSE PRACTITIONER

## 2025-02-05 PROCEDURE — 3079F DIAST BP 80-89 MM HG: CPT | Mod: CPTII,,, | Performed by: NURSE PRACTITIONER

## 2025-02-05 PROCEDURE — 1125F AMNT PAIN NOTED PAIN PRSNT: CPT | Mod: CPTII,,, | Performed by: NURSE PRACTITIONER

## 2025-02-05 PROCEDURE — 3051F HG A1C>EQUAL 7.0%<8.0%: CPT | Mod: CPTII,,, | Performed by: NURSE PRACTITIONER

## 2025-02-05 PROCEDURE — 1160F RVW MEDS BY RX/DR IN RCRD: CPT | Mod: CPTII,,, | Performed by: NURSE PRACTITIONER

## 2025-02-05 PROCEDURE — 1159F MED LIST DOCD IN RCRD: CPT | Mod: CPTII,,, | Performed by: NURSE PRACTITIONER

## 2025-02-05 PROCEDURE — 3288F FALL RISK ASSESSMENT DOCD: CPT | Mod: CPTII,,, | Performed by: NURSE PRACTITIONER

## 2025-02-05 RX ORDER — INSULIN GLARGINE 100 [IU]/ML
26 INJECTION, SOLUTION SUBCUTANEOUS DAILY
Qty: 9 ML | Refills: 2 | Status: SHIPPED | OUTPATIENT
Start: 2025-02-05

## 2025-02-05 NOTE — PROGRESS NOTES
[facility]    Subjective:     Chief Complaint: Neck Pain (8 week f/u, completed P.T., RX and OTC medication for relief, pt would like to schedule injection MRI in chart from 1/31/2025, pain level 7/10)    Referred by: No ref. provider found     History of Present Illness during neurosurgery follow-up appointment on 02/04/2025 included the following:   : Marnie Briggs is a 74 y.o. female presents today as a two-month follow-up after completing physical therapy for neck and bilateral arm pain.  Unfortunately, patient received good pain relief during physical therapy however it was temporary.  Once they stopped the pain returned.  Unfortunately her pain score today is a 7/10.  Standing, walking too long will exacerbate her pain.  It also interrupts her sleep.  Her pain remains located to her posterior neck pain  radiating into bilateral trapezius muscles and shoulders.  There is numbness in the 1st, 2nd, and 3rd fingers more so on the left than right. Patient was last seen with Neurosurgery nurse practitioner Marnie jones yesterday with the following HPI obtained: Marnie Briggs is a 74 y.o. female who presents for follow up appointment with cervical MRI and x-ray.  Patient was seen initially by me on 01/16/2025 in regards to her lumbar spine.  Due to hyperreflexia, cervical imaging was ordered.  She returns today to review studies.  She complains of neck, bilateral scapula and arm pain.  MRI cervical spine was ordered per Marnie jones, nurse practitioner with Neurosurgery.  MRI cervical spine 2025 showed pertinent findings at C2-C3: Broad-based disc osteophyte causing moderately severe central canal stenosis/cord impingement with only a small sliver of preserved subarachnoid space noted to the right of the cord.  Severe bilateral foraminal stenosis.  Patient denies urinary retention or fecal incontinence as well as saddle anesthesia.  She is dropping items at times in her hands.  She reports when she holds  keys or other items in her hand it feels funny.  She denies any spasms at this time.  Patient has pertinent past surgical history of Cervical fusion C3-C4, C4-C5, (ACDF).  Today she wanted to focus more on her neck arm and hand pain versus her low back and leg pain.  She plans on discussing this with her children and praying about receiving an interlaminar cervical epidural steroid injection C7-T1 to see if this will help her pain and spinal cord stenosis.  Her pain score today as a 7/10.  This will elevate to a 10/10 with prolonged driving, looking laterally as well as cervical flexion and extension will elevate her pain as well.  She feels numbness and tingling with the discomfort radiated down her arms into her fingers.  She also feels pain in her bilateral scapula region.    History of Present Illness with me on 11/26/2024 include the following:   : Marnie Briggs is a 74 y.o. female presents today as a 2-1/2 month follow-up for back pain with bilateral sciatica.  The plan during her last office visit with me included future considerations for bilateral L5 or S1.  Patient was unable to swim thus water aerobics were not prescribed.  Additionally I did not prescribe any pain medications as she has CKD.  This visit, patient was accompanied by her daughter.  She would like to address her neck bilateral shoulder and arm pain as well as her bilateral low back pain with bilateral sciatica.  She denies any new injuries.  She also has a history of cervical spine surgery.  Her pain remains located to bilateral low back and bilateral legs that radiate in a posterior pattern into her toes bilaterally.  She has a long standing history of diabetes mellitus that has now controlled over the years that has not always been that way.  Unclear per conversation if she has peripheral neuropathy from diabetes.  Daughter is not sure if she has this latter diagnosis.  However she does have numbness to but both of her feet and has no  history of chemotherapy or other medications that would cause peripheral neuropathy.  Her back pain with bilateral sciatica will elevate to a 7/10 anytime she walks for prolonged periods of time performs chores standing too long.  This pain is described as achy, burning and stabbing.  This also causes insomnia.  Her pain will reduce if she does chores in intervals and only stands for short periods of time.  She currently takes gabapentin 300 mg in the morning and 600 mg at night.  Additionally she takes Tylenol 2000 mg daily.  She has also tried heat massage this exacerbated her symptoms.  She completed physical therapy for her low back and sciatica pain during the summer with 12 sessions.  This exacerbated her pain.  She has also used heat massage which also exacerbates this area of her body's pain.  Patient states her low back to leg pain ratio is a 50 50 split.  She also has some chronic neck pain to bilateral neck that radiates to bilateral shoulders and down bilateral arms and hands.  She does notice that she is dropping items occasionally.  She has no urinary retention however she has some intermittent fecal incontinence.  She denies spasms to her hands or toes.  She has no saddle anesthesia.  She does not have an MRI of her cervical spine.  She has not completed physical therapy for cervicalgia.  She also reports having possible carpal tunnel syndrome as her bilateral thumbs and adjacent to fingers go numb.  This pain will elevate to a 10/10 with overhead lifting and using her arms more as well as turning his neck side-to-side.  Again she does have history of a cervical spine surgery.  Patient does not have a current cervical MRI.  She has a cervical CT in EMR only.  She is interested in starting physical therapy to her neck and bilateral and following in 8 weeks     Pertinent PMH:  CKD stage 2, diabetes mellitus, hyperlipidemia, hypertension  Pertinent PSH:  Cervical fusion C3-C4, C4-C5, (ACDF) ankle fusion  "right, bilateral cataract extraction, no spinal cord stimulator, pacemaker or defibrillator                Visit Vitals  BP (!) 155/81 (BP Location: Right arm, Patient Position: Sitting)   Pulse 68   Temp 97.8 °F (36.6 °C) (Oral)   Ht 5' 3" (1.6 m)   Wt 72.6 kg (160 lb 0.9 oz)   BMI 28.35 kg/m²      Vitals:    02/05/25 0815   PainSc:   7     Pain Disability Index (PDI): 28       Interventional Pain History  08/19/2024:  Right L4 +left L5 TFESI (received 2 weeks of pain relief)       ROS back and leg pain + neck, bilateral shoulder and arm pain    Cervical MRI 2025:   FINDINGS:  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     Prior ACDF at C3-C5.     No acute fracture.     No aggressive marrow signal.     No intrinsic abnormality of the cervical cord.     No acute or significant paraspinous soft tissue abnormality.     C1-C2: Chronic right ventral epidural soft tissue thickening causing effacement of the right ventral thecal sac and flattening of the right ventral cord.     C2-C3: Broad-based disc osteophyte causing moderately severe central canal stenosis/cord impingement with only a small sliver of preserved subarachnoid space noted to the right of the cord.  Severe bilateral foraminal stenosis.     C3-C4: ACDF.  Left facet joint degeneration causing mild left foraminal stenosis.  Central canal and right foramina are patent.     C4-C5: ACDF.  Moderate left foraminal stenosis due to left facet hypertrophy.  Central canal and right foramina are patent.     C5-C6: Disc degeneration which is difficult to quantify due to artifact.  Central canal and foramina are patent.     C6-C7: Severe disc degeneration, degenerative anterolisthesis, and broad-based disc osteophyte causing mild to moderate central canal stenosis and moderate bilateral foraminal stenosis.     C7-T1: Severe disc degeneration, degenerative anterolisthesis, bilateral uncovertebral hypertrophy, and bilateral facet " joint degeneration causing moderate bilateral foraminal stenosis.  Central canal remains patent.     Impression:     C2-C3 broad-based disc osteophyte causing moderately severe central canal stenosis/cord impingement and severe bilateral foraminal stenosis.     C6-C7 mild to moderate central canal stenosis and moderate bilateral foraminal stenosis.     C7-T1 moderate bilateral foraminal stenosis.     C4-C5 moderate left foraminal stenosis.     Prior ACDF at C3-C4 and C4-C5.     Chronic soft tissue thickening of the right ventral epidural space at C1-C2 which effaces the right ventral thecal sac and impinges the right ventral cord.     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        Electronically signed by:Aquiles Boland  Date:                                            01/31/2025  Time:                                           09:49      MRI lumbar spine 2024:  FINDINGS:  There is grade 1 anterolisthesis of L4 over L5.  The vertebral body heights are maintained.  There is no bone marrow edema.     The conus terminates at the level of L1.  It is normal in signal and contour.  There is no epidural fluid collection.     Disc spaces, spinal canal and neural foramina are as follows:     L1-L2: No disc herniation.  No significant spinal canal or neural foraminal stenosis.     L2-L3: Disc bulge and facet hypertrophy with mild narrowing of the spinal canal.  Mild bilateral foraminal stenosis.     L3-L4: Disc bulge and facet hypertrophy with mild narrowing of the spinal canal.  Mild bilateral foraminal stenosis.     L4-L5: Grade 1 anterolisthesis of L4 over L5 with disc bulge, facet hypertrophy and moderate to severe narrowing of the spinal canal.  Moderate to severe right and moderate left neural foraminal stenosis.     L5-S1: Disc bulge and facet hypertrophy.  No significant spinal canal stenosis.  Mild-to-moderate right and severe left neural foraminal stenosis.     No significant  abnormality within the visualized paraspinous musculature.     Impression:     1. Moderate to severe degenerative spinal canal stenosis at L4-5, mild at L2-L4.  2. Multilevel foraminal stenoses as described.        Electronically signed by:Hawa Montiel  Date:                                            06/05/2024  Time:                                           12:44       Objective:        Physical Exam  General: Well developed; normal weight; A&O x 3; No anxiety/depression; NAD  Mental Status: Oriented to person, palce and time. Displays appropriate mood & affect.  Head: Norm cephalic and atraumatic  Neck:  Bilateral cervical paraspinal banding.  Limited and painful range of motion with lateral turning and cervical flexion +extension.  Eyes: normal conjunctiva, normal lids, normal pupils  ENT and mouth: normal external ear, nose, and no lesions noted on the lips.  Respiratory: Symmetrical, Unlabored. No dyspnea  CV: normal rhythm and rate. No peripheral edema.   Abdomen: Non-distended     Extremities:  Gen: No cyanosis or tenderness to palpation bilateral upper and lower extremities  Skin: Warm, pink, dry, no rashes, no lesions on the lumbar spine  Strength: 5/5 motor strength bilateral upper and lower extremities  ROM: Full ROM in bilateral knees without pain or instability.     Neuro:  Gait: no altalgic lean, normal toe and heel raise. Independent ambulator.  DTR's:  3 + in bilateral patellar,   Sensory: Intact to light touch bilateral  upper and lower extremities     Spine: Normal lordosis. No scoliosis  L-spine ROM: limited and painful ROM to flexion, extension, bilateral rotation,   Straight Leg Raise:  +bilaterally  SI Joint: No tenderness to palpation bilaterally.           Assessment:     Unfortunately physical therapy did not provide sustainable pain relief and it was very temporary.  Patient has pertinent findings throughout her cervical MRI most pressing is C 2-C3 severe central +cord effacement +  bilateral severe bilateral foraminal stenosis.  Additionally she has pertinent findings atC6-C7 mild to moderate central canal stenosis and moderate bilateral foraminal stenosis.  Plan of care:   Recommended an interlaminar cervical epidural steroid injection C7-T1  Patient will think about it and discuss with her children to see how she would like to proceed.  She will call back if she would like to proceed with a cervical epidural steroid injection.  Encounter Diagnoses   Name Primary?    Lumbar radiculitis Yes    Cervical stenosis (uterine cervix)     Cervical radiculopathy     Cervical spondylosis with myelopathy     Spondylolisthesis of lumbar region     Lumbar stenosis with neurogenic claudication     Spinal stenosis, cervical region          Plan:       Marnie was seen today for neck pain.    Diagnoses and all orders for this visit:    Lumbar radiculitis    Cervical stenosis (uterine cervix)    Cervical radiculopathy    Cervical spondylosis with myelopathy    Spondylolisthesis of lumbar region    Lumbar stenosis with neurogenic claudication    Spinal stenosis, cervical region           Past Medical History:   Diagnosis Date    CKD (chronic kidney disease) stage 2, GFR 60-89 ml/min     DM (diabetes mellitus)     HLD (hyperlipidemia)     HTN (hypertension)        Past Surgical History:   Procedure Laterality Date    ANKLE FUSION Right     CATARACT EXTRACTION Bilateral     CERVICAL FUSION  2014    C3-4, C4-5 ACDF . Dr. Trujillo    CHOLECYSTECTOMY      COLONOSCOPY  11/30/2015    DILATION AND CURETTAGE OF UTERUS      HYSTERECTOMY      procedure for ureter stenosis      1980's.  Dr. Elkins    STENT, RENAL Right     TRANSFORAMINAL EPIDURAL INJECTION OF STEROID Bilateral 08/19/2024    Procedure: INJECTION, STEROID, EPIDURAL, TRANSFORAMINAL APPROACH  ///Rt L4 & Lt L5;  Surgeon: Marycruz Atwood MD;  Location: [facility];  Service: Pain Management;  Laterality: Bilateral;  TFESI Rt L4 & Lt L5    TUBAL LIGATION          Family History   Problem Relation Name Age of Onset    Diabetes Mother      Stroke Mother      Hypertension Father      Diabetes Father      Heart disease Father      Cancer Brother      Diabetes Brother         Social History     Socioeconomic History    Marital status:     Number of children: 4   Occupational History    Occupation: disabled   Tobacco Use    Smoking status: Never     Passive exposure: Never    Smokeless tobacco: Never   Substance and Sexual Activity    Alcohol use: Not Currently    Drug use: Never    Sexual activity: Not Currently     Partners: Male     Social Drivers of Health     Financial Resource Strain: Low Risk  (7/10/2024)    Overall Financial Resource Strain (CARDIA)     Difficulty of Paying Living Expenses: Not hard at all   Food Insecurity: No Food Insecurity (7/10/2024)    Hunger Vital Sign     Worried About Running Out of Food in the Last Year: Never true     Ran Out of Food in the Last Year: Never true   Transportation Needs: No Transportation Needs (7/10/2024)    TRANSPORTATION NEEDS     Transportation : No   Physical Activity: Inactive (7/9/2024)    Exercise Vital Sign     Days of Exercise per Week: 0 days     Minutes of Exercise per Session: 0 min   Stress: No Stress Concern Present (7/10/2024)    [employer] - Occupational Stress Questionnaire     Feeling of Stress : Not at all   Housing Stability: Low Risk  (7/10/2024)    Housing Stability Vital Sign     Unable to Pay for Housing in the Last Year: No     Homeless in the Last Year: No       Current Outpatient Medications   Medication Sig Dispense Refill    acetaminophen (TYLENOL) 500 MG tablet Take 500 mg by mouth every 6 (six) hours as needed for Pain.      aspirin 81 MG Chew Take 81 mg by mouth once daily at 6am.      atorvastatin (LIPITOR) 80 MG tablet Take 1 tablet (80 mg total) by mouth once daily. 90 tablet 3    carvediloL (COREG) 12.5 MG tablet Take 1 tablet (12.5 mg total) by mouth  "2 (two) times daily. 180 tablet 3    clobetasol 0.05% (TEMOVATE) 0.05 % Oint Apply to affected external vaginal area 2x/week 60 g 6    cyanocobalamin, vitamin B-12, (VITAMIN B-12) 50 mcg tablet Take 50 mcg by mouth once daily.      HYDROcodone-acetaminophen (NORCO) 5-325 mg per tablet Take 1 tablet by mouth every 12 (twelve) hours as needed for Pain. 60 tablet 0    lancets (LANCETS,THIN) Misc 1 Needle by Misc.(Non-Drug; Combo Route) route 2 (two) times a day. 100 each 5    LANTUS SOLOSTAR U-100 INSULIN glargine 100 units/mL SubQ pen Inject 26 Units into the skin once daily. 23.4 mL 2    LIDOcaine (LIDODERM) 5 % Place 1 patch onto the skin once daily. Remove & Discard patch within 12 hours or as directed by MD 30 patch 5    lisinopriL 10 MG tablet Take 1 tablet (10 mg total) by mouth once daily. 90 tablet 3    loratadine (CLARITIN) 10 mg tablet Take 10 mg by mouth once daily.      multivitamin capsule Take 1 capsule by mouth once daily.      pen needle, diabetic 29 gauge x 1/2" Ndle   Insulin pen needles, See Instructions, [place] pen needles for once a day Lantus injection  E11.65, # 90 EA, 3 Refill(s), Pharmacy: [id number], 162, cm, Height/Length Dosing, 12/10/21 9:34:00 CST, 90.2, kg, Weight Dosing, 12/10/21 9:34:00 CST      vitamin D (VITAMIN D3) 1000 units Tab Take 1,000 Units by mouth once daily.      gabapentin (NEURONTIN) 300 MG capsule Take 1 capsule (300 mg total) by mouth As instructed (back pain). 300mg in the morning and 300mg in the afternoon, take 600mg at night       No current facility-administered medications for this visit.       Review of patient's allergies indicates:  No Known Allergies          "

## 2025-02-06 ENCOUNTER — OFFICE VISIT (OUTPATIENT)
Dept: FAMILY MEDICINE | Facility: CLINIC | Age: 75
End: 2025-02-06
Payer: MEDICARE

## 2025-02-06 VITALS
BODY MASS INDEX: 28.52 KG/M2 | TEMPERATURE: 98 F | SYSTOLIC BLOOD PRESSURE: 130 MMHG | OXYGEN SATURATION: 97 % | DIASTOLIC BLOOD PRESSURE: 84 MMHG | HEIGHT: 63 IN | WEIGHT: 160.94 LBS | HEART RATE: 71 BPM

## 2025-02-06 DIAGNOSIS — D64.9 NORMOCYTIC ANEMIA: ICD-10-CM

## 2025-02-06 DIAGNOSIS — E78.5 HYPERLIPIDEMIA, UNSPECIFIED HYPERLIPIDEMIA TYPE: ICD-10-CM

## 2025-02-06 DIAGNOSIS — E03.9 HYPOTHYROIDISM, UNSPECIFIED TYPE: ICD-10-CM

## 2025-02-06 DIAGNOSIS — E83.42 HYPOMAGNESEMIA: ICD-10-CM

## 2025-02-06 DIAGNOSIS — E55.9 VITAMIN D DEFICIENCY: Primary | ICD-10-CM

## 2025-02-06 LAB
25(OH)D3+25(OH)D2 SERPL-MCNC: 89 NG/ML (ref 30–80)
ALBUMIN SERPL-MCNC: 3.9 G/DL (ref 3.4–4.8)
ALBUMIN/GLOB SERPL: 1.1 RATIO (ref 1.1–2)
ALP SERPL-CCNC: 67 UNIT/L (ref 40–150)
ALT SERPL-CCNC: 20 UNIT/L (ref 0–55)
ANION GAP SERPL CALC-SCNC: 9 MEQ/L
AST SERPL-CCNC: 25 UNIT/L (ref 5–34)
BASOPHILS # BLD AUTO: 0.02 X10(3)/MCL
BASOPHILS NFR BLD AUTO: 0.3 %
BILIRUB SERPL-MCNC: 0.6 MG/DL
BUN SERPL-MCNC: 14.5 MG/DL (ref 9.8–20.1)
CALCIUM SERPL-MCNC: 8.9 MG/DL (ref 8.4–10.2)
CHLORIDE SERPL-SCNC: 108 MMOL/L (ref 98–107)
CHOLEST SERPL-MCNC: 109 MG/DL
CHOLEST/HDLC SERPL: 3 {RATIO} (ref 0–5)
CO2 SERPL-SCNC: 24 MMOL/L (ref 23–31)
CREAT SERPL-MCNC: 1.16 MG/DL (ref 0.55–1.02)
CREAT/UREA NIT SERPL: 13
EOSINOPHIL # BLD AUTO: 0.21 X10(3)/MCL (ref 0–0.9)
EOSINOPHIL NFR BLD AUTO: 2.7 %
ERYTHROCYTE [DISTWIDTH] IN BLOOD BY AUTOMATED COUNT: 13.8 % (ref 11.5–17)
GFR SERPLBLD CREATININE-BSD FMLA CKD-EPI: 50 ML/MIN/1.73/M2
GLOBULIN SER-MCNC: 3.6 GM/DL (ref 2.4–3.5)
GLUCOSE SERPL-MCNC: 104 MG/DL (ref 82–115)
HCT VFR BLD AUTO: 38.8 % (ref 37–47)
HDLC SERPL-MCNC: 41 MG/DL (ref 35–60)
HGB BLD-MCNC: 12.2 G/DL (ref 12–16)
IMM GRANULOCYTES # BLD AUTO: 0.02 X10(3)/MCL (ref 0–0.04)
IMM GRANULOCYTES NFR BLD AUTO: 0.3 %
LDLC SERPL CALC-MCNC: 36 MG/DL (ref 50–140)
LYMPHOCYTES # BLD AUTO: 2.57 X10(3)/MCL (ref 0.6–4.6)
LYMPHOCYTES NFR BLD AUTO: 33.5 %
MAGNESIUM SERPL-MCNC: 1.8 MG/DL (ref 1.6–2.6)
MCH RBC QN AUTO: 28.8 PG (ref 27–31)
MCHC RBC AUTO-ENTMCNC: 31.4 G/DL (ref 33–36)
MCV RBC AUTO: 91.5 FL (ref 80–94)
MONOCYTES # BLD AUTO: 0.48 X10(3)/MCL (ref 0.1–1.3)
MONOCYTES NFR BLD AUTO: 6.3 %
NEUTROPHILS # BLD AUTO: 4.37 X10(3)/MCL (ref 2.1–9.2)
NEUTROPHILS NFR BLD AUTO: 56.9 %
NRBC BLD AUTO-RTO: 0 %
PHOSPHATE SERPL-MCNC: 3.2 MG/DL (ref 2.3–4.7)
PLATELET # BLD AUTO: 245 X10(3)/MCL (ref 130–400)
PMV BLD AUTO: 11 FL (ref 7.4–10.4)
POTASSIUM SERPL-SCNC: 4.3 MMOL/L (ref 3.5–5.1)
PROT SERPL-MCNC: 7.5 GM/DL (ref 5.8–7.6)
RBC # BLD AUTO: 4.24 X10(6)/MCL (ref 4.2–5.4)
SODIUM SERPL-SCNC: 141 MMOL/L (ref 136–145)
TRIGL SERPL-MCNC: 158 MG/DL (ref 37–140)
TSH SERPL-ACNC: 1.51 UIU/ML (ref 0.35–4.94)
VLDLC SERPL CALC-MCNC: 32 MG/DL
WBC # BLD AUTO: 7.67 X10(3)/MCL (ref 4.5–11.5)

## 2025-02-06 PROCEDURE — 80053 COMPREHEN METABOLIC PANEL: CPT

## 2025-02-06 PROCEDURE — 83735 ASSAY OF MAGNESIUM: CPT

## 2025-02-06 PROCEDURE — 82306 VITAMIN D 25 HYDROXY: CPT

## 2025-02-06 PROCEDURE — 36415 COLL VENOUS BLD VENIPUNCTURE: CPT

## 2025-02-06 PROCEDURE — 80061 LIPID PANEL: CPT

## 2025-02-06 PROCEDURE — 85025 COMPLETE CBC W/AUTO DIFF WBC: CPT

## 2025-02-06 PROCEDURE — 84443 ASSAY THYROID STIM HORMONE: CPT

## 2025-02-06 PROCEDURE — 84100 ASSAY OF PHOSPHORUS: CPT

## 2025-02-06 PROCEDURE — 99213 OFFICE O/P EST LOW 20 MIN: CPT | Mod: PBBFAC
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
## 2025-02-10 ENCOUNTER — TELEPHONE (OUTPATIENT)
Facility: CLINIC | Age: 75
End: 2025-02-10
Payer: MEDICARE

## 2025-02-10 DIAGNOSIS — M47.12 CERVICAL SPONDYLOSIS WITH MYELOPATHY: ICD-10-CM

## 2025-02-10 DIAGNOSIS — M50.30 DDD (DEGENERATIVE DISC DISEASE), CERVICAL: Primary | ICD-10-CM

## 2025-02-10 DIAGNOSIS — M54.12 CERVICAL RADICULOPATHY: ICD-10-CM

## 2025-02-10 DIAGNOSIS — M54.2 CERVICALGIA: ICD-10-CM

## 2025-02-10 NOTE — TELEPHONE ENCOUNTER
"Patient was seen on 2/5/25 with Vanessa where the plan was "Recommended an interlaminar cervical epidural steroid injection C7-T1.  Patient will think about it and discuss with her children to see how she would like to proceed.  She will call back if she would like to proceed with a cervical epidural steroid injection."  Patient called today and would like to proceed with scheduling the recommended injection above.    "

## 2025-02-17 ENCOUNTER — RESULTS FOLLOW-UP (OUTPATIENT)
Dept: CARDIOLOGY | Facility: CLINIC | Age: 75
End: 2025-02-17

## 2025-02-17 ENCOUNTER — OFFICE VISIT (OUTPATIENT)
Dept: CARDIOLOGY | Facility: CLINIC | Age: 75
End: 2025-02-17
Payer: MEDICARE

## 2025-02-17 ENCOUNTER — LAB VISIT (OUTPATIENT)
Dept: LAB | Facility: HOSPITAL | Age: 75
End: 2025-02-17
Attending: NURSE PRACTITIONER
Payer: MEDICARE

## 2025-02-17 VITALS
HEART RATE: 70 BPM | WEIGHT: 158.19 LBS | BODY MASS INDEX: 27.01 KG/M2 | RESPIRATION RATE: 18 BRPM | SYSTOLIC BLOOD PRESSURE: 139 MMHG | OXYGEN SATURATION: 100 % | TEMPERATURE: 98 F | HEIGHT: 64 IN | DIASTOLIC BLOOD PRESSURE: 88 MMHG

## 2025-02-17 DIAGNOSIS — E11.22 TYPE 2 DIABETES MELLITUS WITH STAGE 2 CHRONIC KIDNEY DISEASE, WITH LONG-TERM CURRENT USE OF INSULIN: Chronic | ICD-10-CM

## 2025-02-17 DIAGNOSIS — I10 ESSENTIAL HYPERTENSION: Primary | Chronic | ICD-10-CM

## 2025-02-17 DIAGNOSIS — N18.9 CHRONIC KIDNEY DISEASE, UNSPECIFIED CKD STAGE: ICD-10-CM

## 2025-02-17 DIAGNOSIS — N18.2 TYPE 2 DIABETES MELLITUS WITH STAGE 2 CHRONIC KIDNEY DISEASE, WITH LONG-TERM CURRENT USE OF INSULIN: Chronic | ICD-10-CM

## 2025-02-17 DIAGNOSIS — Z79.4 TYPE 2 DIABETES MELLITUS WITH STAGE 2 CHRONIC KIDNEY DISEASE, WITH LONG-TERM CURRENT USE OF INSULIN: Chronic | ICD-10-CM

## 2025-02-17 DIAGNOSIS — E78.2 MIXED HYPERLIPIDEMIA: ICD-10-CM

## 2025-02-17 DIAGNOSIS — E78.00 HYPERCHOLESTEROLEMIA: Chronic | ICD-10-CM

## 2025-02-17 LAB
ALBUMIN SERPL-MCNC: 4 G/DL (ref 3.4–4.8)
ALBUMIN/GLOB SERPL: 1.2 RATIO (ref 1.1–2)
ALP SERPL-CCNC: 66 UNIT/L (ref 40–150)
ALT SERPL-CCNC: 15 UNIT/L (ref 0–55)
ANION GAP SERPL CALC-SCNC: 8 MEQ/L
AST SERPL-CCNC: 15 UNIT/L (ref 5–34)
BACTERIA #/AREA URNS AUTO: ABNORMAL /HPF
BASOPHILS # BLD AUTO: 0.02 X10(3)/MCL
BASOPHILS NFR BLD AUTO: 0.2 %
BILIRUB SERPL-MCNC: 0.9 MG/DL
BILIRUB UR QL STRIP.AUTO: NEGATIVE
BUN SERPL-MCNC: 17.9 MG/DL (ref 9.8–20.1)
CALCIUM SERPL-MCNC: 9.5 MG/DL (ref 8.4–10.2)
CHLORIDE SERPL-SCNC: 106 MMOL/L (ref 98–107)
CHOLEST SERPL-MCNC: 89 MG/DL
CHOLEST/HDLC SERPL: 2 {RATIO} (ref 0–5)
CLARITY UR: CLEAR
CO2 SERPL-SCNC: 28 MMOL/L (ref 23–31)
COLOR UR AUTO: ABNORMAL
CREAT SERPL-MCNC: 1.02 MG/DL (ref 0.55–1.02)
CREAT UR-MCNC: 62.8 MG/DL (ref 45–106)
CREAT/UREA NIT SERPL: 18
EOSINOPHIL # BLD AUTO: 0.19 X10(3)/MCL (ref 0–0.9)
EOSINOPHIL NFR BLD AUTO: 2 %
ERYTHROCYTE [DISTWIDTH] IN BLOOD BY AUTOMATED COUNT: 13.7 % (ref 11.5–17)
GFR SERPLBLD CREATININE-BSD FMLA CKD-EPI: 59 ML/MIN/1.73/M2
GLOBULIN SER-MCNC: 3.4 GM/DL (ref 2.4–3.5)
GLUCOSE SERPL-MCNC: 160 MG/DL (ref 82–115)
GLUCOSE UR QL STRIP: NORMAL
HCT VFR BLD AUTO: 39.7 % (ref 37–47)
HDLC SERPL-MCNC: 39 MG/DL (ref 35–60)
HGB BLD-MCNC: 12.6 G/DL (ref 12–16)
HGB UR QL STRIP: NEGATIVE
HYALINE CASTS #/AREA URNS LPF: ABNORMAL /LPF
IMM GRANULOCYTES # BLD AUTO: 0.03 X10(3)/MCL (ref 0–0.04)
IMM GRANULOCYTES NFR BLD AUTO: 0.3 %
KETONES UR QL STRIP: NEGATIVE
LDLC SERPL CALC-MCNC: 27 MG/DL (ref 50–140)
LEUKOCYTE ESTERASE UR QL STRIP: 75
LYMPHOCYTES # BLD AUTO: 2.35 X10(3)/MCL (ref 0.6–4.6)
LYMPHOCYTES NFR BLD AUTO: 24.9 %
MAGNESIUM SERPL-MCNC: 1.4 MG/DL (ref 1.6–2.6)
MCH RBC QN AUTO: 28.6 PG (ref 27–31)
MCHC RBC AUTO-ENTMCNC: 31.7 G/DL (ref 33–36)
MCV RBC AUTO: 90 FL (ref 80–94)
MONOCYTES # BLD AUTO: 0.49 X10(3)/MCL (ref 0.1–1.3)
MONOCYTES NFR BLD AUTO: 5.2 %
MUCOUS THREADS URNS QL MICRO: ABNORMAL /LPF
NEUTROPHILS # BLD AUTO: 6.36 X10(3)/MCL (ref 2.1–9.2)
NEUTROPHILS NFR BLD AUTO: 67.4 %
NITRITE UR QL STRIP: NEGATIVE
NRBC BLD AUTO-RTO: 0 %
PH UR STRIP: 6 [PH]
PHOSPHATE SERPL-MCNC: 3.8 MG/DL (ref 2.3–4.7)
PLATELET # BLD AUTO: 232 X10(3)/MCL (ref 130–400)
PMV BLD AUTO: 10.1 FL (ref 7.4–10.4)
POTASSIUM SERPL-SCNC: 4.3 MMOL/L (ref 3.5–5.1)
PROT SERPL-MCNC: 7.4 GM/DL (ref 5.8–7.6)
PROT UR QL STRIP: NEGATIVE
PROT UR STRIP-MCNC: <6.8 MG/DL
PTH-INTACT SERPL-MCNC: 32.6 PG/ML (ref 8.7–77)
RBC # BLD AUTO: 4.41 X10(6)/MCL (ref 4.2–5.4)
RBC #/AREA URNS AUTO: ABNORMAL /HPF
SODIUM SERPL-SCNC: 142 MMOL/L (ref 136–145)
SP GR UR STRIP.AUTO: 1.01 (ref 1–1.03)
SQUAMOUS #/AREA URNS LPF: ABNORMAL /HPF
TRIGL SERPL-MCNC: 116 MG/DL (ref 37–140)
UROBILINOGEN UR STRIP-ACNC: NORMAL
VLDLC SERPL CALC-MCNC: 23 MG/DL
WBC # BLD AUTO: 9.44 X10(3)/MCL (ref 4.5–11.5)
WBC #/AREA URNS AUTO: ABNORMAL /HPF

## 2025-02-17 PROCEDURE — 83735 ASSAY OF MAGNESIUM: CPT

## 2025-02-17 PROCEDURE — 82570 ASSAY OF URINE CREATININE: CPT

## 2025-02-17 PROCEDURE — 80061 LIPID PANEL: CPT

## 2025-02-17 PROCEDURE — 36415 COLL VENOUS BLD VENIPUNCTURE: CPT

## 2025-02-17 PROCEDURE — 81001 URINALYSIS AUTO W/SCOPE: CPT

## 2025-02-17 PROCEDURE — 83970 ASSAY OF PARATHORMONE: CPT

## 2025-02-17 PROCEDURE — 3075F SYST BP GE 130 - 139MM HG: CPT | Mod: CPTII,,, | Performed by: NURSE PRACTITIONER

## 2025-02-17 PROCEDURE — 85025 COMPLETE CBC W/AUTO DIFF WBC: CPT

## 2025-02-17 PROCEDURE — 84100 ASSAY OF PHOSPHORUS: CPT

## 2025-02-17 PROCEDURE — 3079F DIAST BP 80-89 MM HG: CPT | Mod: CPTII,,, | Performed by: NURSE PRACTITIONER

## 2025-02-17 PROCEDURE — 99215 OFFICE O/P EST HI 40 MIN: CPT | Mod: PBBFAC | Performed by: NURSE PRACTITIONER

## 2025-02-17 PROCEDURE — 80053 COMPREHEN METABOLIC PANEL: CPT

## 2025-02-17 NOTE — PATIENT INSTRUCTIONS
Follow up in cardiology Clinic in 6 months or sooner if needed  Follow up with PCP as directed

## 2025-02-17 NOTE — TELEPHONE ENCOUNTER
Reviewed lipid panel with the patient.  All questions answered.  Patient verbalizes understanding.

## 2025-02-17 NOTE — PROGRESS NOTES
CHIEF COMPLAINT:   Chief Complaint   Patient presents with    Follow-up     Denies any cardiac problems                                                  HPI:  Marnie Briggs 74 y.o. female  with a PMH significant for HTN, HLD, DM II, CKD, Hashimoto's, Obesity who presents to cardiology clinic for routine follow-up and ongoing care.  Latest Echocardiogram obtained on 7.24.23 revealed a preserved EF of 59%, normal diastolic function and no significant valvular abnormalities.  Thirty Day Event monitor obtained from 9.13.23-10.13.23 was unremarkable, revealing underlying sinus rhythm with no significant arrhythmias noted.    Patient presents to clinic today reporting that she feels good and denies any cardiovascular complaints.  The patient is able to perform her routine heavy housework, ADLs and occasionally play with her great grandchildren without experiencing any angina or angina equivalent symptoms.  She does not participate in any formal exercise regimen but states that she tries to remain fairly active.  However, she does have some activity limitations secondary to chronic back pain.  She endorses compliance with her current medications and is tolerating without any issues.      LEVEL OF EXERTION:  The patient does house work and does not have symptoms with this level of exertion.                                                                                                                                                                                                                                                                                                   CARDIAC TESTING:  NICHOLE of BLE 3.12.24  There were multiphasic waveforms at the ankles bilaterally.  The resting ABIs were normal.  The post stress ABIs were normal.     There was no significant arterial insufficiency identified on this study.         ECHO 7.24.23    Interpretation Summary  · Concentric hypertrophy and normal systolic function.  · The  estimated ejection fraction is 59%.  · Normal left ventricular diastolic function.  · Normal central venous pressure (3 mmHg).  · With normal right ventricular systolic function.    Carotid US 12.6.23  The right internal carotid artery demonstrated no hemodynamically significant stenosis.  There is a mild amount of calcified plaque in the right carotid bulb.     The left internal carotid artery demonstrated no hemodynamically significant stenosis.  There is a minimal amount of plaque in the left carotid bulb.     The right vertebral artery is patent with dampened antegrade flow.  The left vertebral artery is dominant and patent with antegrade flow.    30 Day Event Monitor 9.13.23-10.13.23  Physician's interpretation   1. Underlying rhythm is sinus   2. No symptoms reported   3. No patient activated events   4. No significant arrhythmias noted       Patient Active Problem List   Diagnosis    Type 2 diabetes mellitus with stage 2 chronic kidney disease, with long-term current use of insulin    Essential hypertension    Stage 2 chronic kidney disease due to type 2 diabetes mellitus    Chronic right-sided low back pain with right-sided sciatica    Depression    Class 1 obesity due to excess calories with serious comorbidity and body mass index (BMI) of 32.0 to 32.9 in adult    Anxiety    Lichen sclerosus et atrophicus    Cataract    Lumbar degenerative disc disease    Cervical stenosis of spinal canal    Epiretinal membrane (ERM) of left eye    Gastroesophageal reflux disease    Insomnia    Neck pain    Neuropathy    Nuclear senile cataract    Severe acute respiratory syndrome coronavirus 2 (SARS-CoV-2) vaccination not indicated    Sinusitis    Needs flu shot    Hashimoto's thyroiditis    Retinoschisis of left eye    Lumbar radiculitis    Bacteremia    Mixed hyperlipidemia     Past Surgical History:   Procedure Laterality Date    ANKLE FUSION Right     CATARACT EXTRACTION Bilateral      CERVICAL FUSION  2014    C3-4, C4-5 ACDF . Dr. Trujillo    CHOLECYSTECTOMY      COLONOSCOPY  11/30/2015    DILATION AND CURETTAGE OF UTERUS      HYSTERECTOMY      procedure for ureter stenosis      1980's.  Dr. Elkins    STENT, RENAL Right     TRANSFORAMINAL EPIDURAL INJECTION OF STEROID Bilateral 08/19/2024    Procedure: INJECTION, STEROID, EPIDURAL, TRANSFORAMINAL APPROACH  ///Rt L4 & Lt L5;  Surgeon: Marycruz Atwood MD;  Location: [facility] OR;  Service: Pain Management;  Laterality: Bilateral;  TFESI Rt L4 & Lt L5    TUBAL LIGATION       Social History     Socioeconomic History    Marital status:     Number of children: 4   Occupational History    Occupation: disabled   Tobacco Use    Smoking status: Never     Passive exposure: Never    Smokeless tobacco: Never   Substance and Sexual Activity    Alcohol use: Not Currently    Drug use: Never    Sexual activity: Not Currently     Partners: Male     Social Drivers of Health     Financial Resource Strain: Low Risk  (7/10/2024)    Overall Financial Resource Strain (CARDIA)     Difficulty of Paying Living Expenses: Not hard at all   Food Insecurity: No Food Insecurity (7/10/2024)    Hunger Vital Sign     Worried About Running Out of Food in the Last Year: Never true     Ran Out of Food in the Last Year: Never true   Transportation Needs: No Transportation Needs (7/10/2024)    TRANSPORTATION NEEDS     Transportation : No   Physical Activity: Inactive (7/9/2024)    Exercise Vital Sign     Days of Exercise per Week: 0 days     Minutes of Exercise per Session: 0 min   Stress: No Stress Concern Present (7/10/2024)    [employer] - Occupational Stress Questionnaire     Feeling of Stress : Not at all   Housing Stability: Unknown (7/10/2024)    Housing Stability Vital Sign     Unable to Pay for Housing in the Last Year: No     Homeless in the Last Year: No        Family History   Problem Relation Name Age of Onset  "   Diabetes Mother      Stroke Mother      Hypertension Father      Diabetes Father      Heart disease Father      Cancer Brother      Diabetes Brother       Review of patient's allergies indicates:  No Known Allergies      ROS:  Review of Systems   Constitutional: Negative.    HENT: Negative.     Eyes: Negative.    Respiratory: Negative.  Negative for shortness of breath.    Cardiovascular: Negative.  Negative for chest pain.   Gastrointestinal: Negative.    Genitourinary: Negative.    Musculoskeletal: Negative.    Skin: Negative.    Neurological: Negative.    Endo/Heme/Allergies: Negative.    Psychiatric/Behavioral: Negative.                                                                                                                                                                                  Negative except as stated in the history of present illness. See HPI for details.    PHYSICAL EXAM:  Visit Vitals  /88 (BP Location: Left arm, Patient Position: Sitting)   Pulse 70   Temp 97.7 °F (36.5 °C) (Oral)   Resp 18   Ht 5' 4" (1.626 m)   Wt 71.8 kg (158 lb 3.2 oz)   SpO2 100%   BMI 27.15 kg/m²           Physical Exam  HENT:      Head: Normocephalic.      Nose: Nose normal.   Eyes:      Pupils: Pupils are equal, round, and reactive to light.   Cardiovascular:      Rate and Rhythm: Normal rate and regular rhythm.   Pulmonary:      Effort: Pulmonary effort is normal.   Abdominal:      General: Abdomen is flat. Bowel sounds are normal.      Palpations: Abdomen is soft.   Musculoskeletal:         General: Normal range of motion.      Cervical back: Normal range of motion.   Skin:     General: Skin is warm.   Neurological:      General: No focal deficit present.      Mental Status: She is alert.   Psychiatric:         Mood and Affect: Mood normal.     Current Outpatient Medications   Medication Instructions    acetaminophen (TYLENOL) 500 mg, Every 6 hours PRN    aspirin 81 mg, Daily    atorvastatin " "(LIPITOR) 80 mg, Oral, Daily    carvediloL (COREG) 12.5 mg, Oral, 2 times daily    clobetasol 0.05% (TEMOVATE) 0.05 % Oint Apply to affected external vaginal area 2x/week    cyanocobalamin (vitamin B-12) (VITAMIN B-12) 50 mcg, Daily    gabapentin (NEURONTIN) 300 mg, Oral, See admin instructions, 300mg in the morning and 300mg in the afternoon, take 600mg at night    HYDROcodone-acetaminophen (NORCO) 5-325 mg per tablet 1 tablet, Oral, Every 12 hours PRN    lancets (LANCETS,THIN) Misc 1 Needle, Misc.(Non-Drug; Combo Route), 2 times daily    LANTUS SOLOSTAR U-100 INSULIN 26 Units, Subcutaneous, Daily    LIDOcaine (LIDODERM) 5 % 1 patch, Transdermal, Daily, Remove & Discard patch within 12 hours or as directed by MD    lisinopriL 10 mg, Oral, Daily    loratadine (CLARITIN) 10 mg, Daily    multivitamin capsule 1 capsule, Daily    pen needle, diabetic 29 gauge x 1/2" Ndle   Insulin pen needles, See Instructions, Inuslin pen needles for once a day Lantus injection  E11.65, # 90 EA, 3 Refill(s), Pharmacy: [id number], 162, cm, Height/Length Dosing, 12/10/21 9:34:00 CST, 90.2, kg, Weight Dosing, 12/10/21 9:34:00 CST    vitamin D (VITAMIN D3) 1,000 Units, Daily        All medications, laboratory studies, cardiac diagnostic imaging reviewed.     Lab Results   Component Value Date    LDL 36.00 (L) 02/06/2025    LDL 33.00 (L) 04/30/2024    TRIG 158 (H) 02/06/2025    TRIG 83 04/30/2024    CREATININE 1.16 (H) 02/06/2025    MG 1.80 02/06/2025    K 4.3 02/06/2025        ASSESSMENT/PLAN:  HTN   - BP at goal    - Continue Coreg 12.5 mg and Lisinopril 10 mg   - Continue Low Na Diet and Exercise as tolerated      HTN  - LDL -36- at goal  - Continue atorvastatin 80mg.   - Zetia previously discontinued  - Counseled on low-cholesterol diet and Exercise as tolerated     DM II  - A1C-7.5  - Management per PCP     Hx Syncope- Denies any further syncopal episodes (2023)  - Previous Cardiac workup unremarkable   - " Carotid US -no hemodynamically significant stenosis to bilateral internal carotid arteries (12.6.23)  - 30 Day monitor -underlying sinus rhythm with no significant arrhythmias noted (9.13.23-10.13.23)  - EF- preserved at 59% with no significant structural or valvular abnormalities per ECHO 7.24.23          Follow up in cardiology Clinic in 6 months or sooner if needed  Follow up with PCP as directed

## 2025-02-21 DIAGNOSIS — G89.29 CHRONIC RIGHT-SIDED LOW BACK PAIN WITH RIGHT-SIDED SCIATICA: Chronic | ICD-10-CM

## 2025-02-21 DIAGNOSIS — M54.41 CHRONIC RIGHT-SIDED LOW BACK PAIN WITH RIGHT-SIDED SCIATICA: Chronic | ICD-10-CM

## 2025-02-21 DIAGNOSIS — M51.369 LUMBAR DEGENERATIVE DISC DISEASE: ICD-10-CM

## 2025-02-22 RX ORDER — HYDROCODONE BITARTRATE AND ACETAMINOPHEN 5; 325 MG/1; MG/1
1 TABLET ORAL EVERY 12 HOURS PRN
Qty: 60 TABLET | Refills: 0 | Status: SHIPPED | OUTPATIENT
Start: 2025-02-22

## 2025-02-27 ENCOUNTER — OFFICE VISIT (OUTPATIENT)
Facility: CLINIC | Age: 75
End: 2025-02-27
Payer: MEDICARE

## 2025-02-27 VITALS
DIASTOLIC BLOOD PRESSURE: 77 MMHG | SYSTOLIC BLOOD PRESSURE: 115 MMHG | HEART RATE: 85 BPM | BODY MASS INDEX: 26.98 KG/M2 | TEMPERATURE: 98 F | WEIGHT: 158 LBS | HEIGHT: 64 IN

## 2025-02-27 DIAGNOSIS — M50.20 CERVICAL DISC DISPLACEMENT: ICD-10-CM

## 2025-02-27 DIAGNOSIS — M54.12 CERVICAL RADICULOPATHY: Primary | ICD-10-CM

## 2025-02-27 PROCEDURE — 3288F FALL RISK ASSESSMENT DOCD: CPT | Mod: CPTII,,, | Performed by: NURSE PRACTITIONER

## 2025-02-27 PROCEDURE — 99213 OFFICE O/P EST LOW 20 MIN: CPT | Mod: ,,, | Performed by: NURSE PRACTITIONER

## 2025-02-27 PROCEDURE — 3008F BODY MASS INDEX DOCD: CPT | Mod: CPTII,,, | Performed by: NURSE PRACTITIONER

## 2025-02-27 PROCEDURE — 1101F PT FALLS ASSESS-DOCD LE1/YR: CPT | Mod: CPTII,,, | Performed by: NURSE PRACTITIONER

## 2025-02-27 PROCEDURE — 1160F RVW MEDS BY RX/DR IN RCRD: CPT | Mod: CPTII,,, | Performed by: NURSE PRACTITIONER

## 2025-02-27 PROCEDURE — 3078F DIAST BP <80 MM HG: CPT | Mod: CPTII,,, | Performed by: NURSE PRACTITIONER

## 2025-02-27 PROCEDURE — 3074F SYST BP LT 130 MM HG: CPT | Mod: CPTII,,, | Performed by: NURSE PRACTITIONER

## 2025-02-27 PROCEDURE — 1159F MED LIST DOCD IN RCRD: CPT | Mod: CPTII,,, | Performed by: NURSE PRACTITIONER

## 2025-02-27 PROCEDURE — 3051F HG A1C>EQUAL 7.0%<8.0%: CPT | Mod: CPTII,,, | Performed by: NURSE PRACTITIONER

## 2025-02-27 PROCEDURE — 1125F AMNT PAIN NOTED PAIN PRSNT: CPT | Mod: CPTII,,, | Performed by: NURSE PRACTITIONER

## 2025-02-27 NOTE — PROGRESS NOTES
Pain Management Clinic  Pre-Operative Clinic Note      SUBJECTIVE    CHIEF COMPLAINT: Pre-op Exam (Pre op procedure 3/12/25 C/O pain level 8, Taking pain meds,pt states had recent fall 2/17/25 Lt hand swelling and scrapes from fall.)       History of Present Illness: 74 y.o. female presents today for preoperative evaluation for an interlaminar cervical epidural steroid injection C7-T1 on 03/12/2025. I reviewed the indications for procedure. The risks and benefits of the proposed and alternative treatments were discussed with the patient. Questions pertinent to the procedure were solicited and answered. No assurances were given. Informed consent was obtained. The patient expressed good understanding and wished to proceed with scheduling the procedure.     Review of Systems:   Constitutional: No fever, weakness, or fatigue.   Ear/Nose/Mouth/Throat: No nasal congestion or sore throat.   Respiratory: No shortness of breath or cough.   Cardiovascular: No chest pain, palpitations, or peripheral edema.   Gastrointestinal: No nausea, vomiting, or abdominal pain.   Genitourinary: No dysuria.  Musculoskeletal:  During last office visit with me on 02/05/2025, patient completed OT/PT for her cervical radiculopathy.  Causing pain to her neck and bilateral arms.  Unfortunately, patient received excellent pain relief during the physical therapy however it was temporary.  Once she stopped the pain returned.  During last office visit her pain score was a 7/10 on the NRS S. she reports standing, walking too long driving for long periods of time or looking side-to-side will also exacerbate her pain.  Her pain remains located to her posterior neck that would radiate into her bilateral trapezius muscles and shoulders.  There was numbness in her 1st 2nd 3rd fingers more so on the left than the right.  Patient was also seen by Neurosurgery nurse practitioner.  MRI cervical spine 2025 showed pertinent findings at C2-C3 with a broad based  "disc osteophyte complex causing moderately severe central canal stenosis/cord impingement with only a small sliver preserved subarachnoid space noted to the right of the cord.  She also had severe bilateral foraminal stenosis in the region.  Patient has been dropping items in her hands and continues to deny urinary retention or fecal incontinence as well as saddle anesthesia.      Past Surgical History:   Procedure Laterality Date    ANKLE FUSION Right     CATARACT EXTRACTION Bilateral     CERVICAL FUSION  2014    C3-4, C4-5 ACDF . Dr. Trujillo    CHOLECYSTECTOMY      COLONOSCOPY  11/30/2015    DILATION AND CURETTAGE OF UTERUS      HYSTERECTOMY      procedure for ureter stenosis      1980's.  Dr. Elkins    STENT, RENAL Right     TRANSFORAMINAL EPIDURAL INJECTION OF STEROID Bilateral 08/19/2024    Procedure: INJECTION, STEROID, EPIDURAL, TRANSFORAMINAL APPROACH  ///Rt L4 & Lt L5;  Surgeon: Marycruz Atwood MD;  Location: [facility];  Service: Pain Management;  Laterality: Bilateral;  TFESI Rt L4 & Lt L5    TUBAL LIGATION          Past Medical History:   Diagnosis Date    CKD (chronic kidney disease) stage 2, GFR 60-89 ml/min     DM (diabetes mellitus)     HLD (hyperlipidemia)     HTN (hypertension)         OBJECTIVE:    Visit Vitals  /77 (BP Location: Left arm, Patient Position: Sitting)   Pulse 85   Temp 98.1 °F (36.7 °C)   Ht 5' 4" (1.626 m)   Wt 71.7 kg (158 lb)   BMI 27.12 kg/m²     Vitals:    02/27/25 1323   PainSc:   8       Physical Exam:   General: Well-developed, well-nourished.  Neuro: Alert and oriented x 3.  Psych: Normal mood and affect.  HEENT: Normocephalic. PERRLA EOM intact. Nose and throat clear.  Lungs: Clear to auscultation and percussion.  Heart: Regular rate and rhythm   Abdomen: Soft non-tender. Bowel sounds positive. No rebound tenderness.  Skin: No rashes or open wounds  Musculoskeletal: Bilateral cervical paraspinal banding.  Equal handgrip strength bilaterally.  Limited and painful " range of motion with lateral turning of neck and flexion and extension.  DTRs 3+ bilateral patella  ASSESSMENT:  1. Cervical radiculopathy    2. Cervical disc displacement       PLAN:  Plan for to proceed with interlaminar cervical epidural steroid injection C7-T1 on 03/12/2025.   [x] The patient has been given preoperative instructions and prescriptions for post-operative medication.   [x] Medications to hold:  Aspirin 81 mg 7 days prior to the procedure and resume postop.  [] Cardiac clearance received and reviewed.  [x] Post-operative appointment is scheduled for 2 weeks.

## 2025-02-27 NOTE — H&P (VIEW-ONLY)
Pain Management Clinic  Pre-Operative Clinic Note      SUBJECTIVE    CHIEF COMPLAINT: Pre-op Exam (Pre op procedure 3/12/25 C/O pain level 8, Taking pain meds,pt states had recent fall 2/17/25 Lt hand swelling and scrapes from fall.)       History of Present Illness: 74 y.o. female presents today for preoperative evaluation for an interlaminar cervical epidural steroid injection C7-T1 on 03/12/2025. I reviewed the indications for procedure. The risks and benefits of the proposed and alternative treatments were discussed with the patient. Questions pertinent to the procedure were solicited and answered. No assurances were given. Informed consent was obtained. The patient expressed good understanding and wished to proceed with scheduling the procedure.     Review of Systems:   Constitutional: No fever, weakness, or fatigue.   Ear/Nose/Mouth/Throat: No nasal congestion or sore throat.   Respiratory: No shortness of breath or cough.   Cardiovascular: No chest pain, palpitations, or peripheral edema.   Gastrointestinal: No nausea, vomiting, or abdominal pain.   Genitourinary: No dysuria.  Musculoskeletal:  During last office visit with me on 02/05/2025, patient completed OT/PT for her cervical radiculopathy.  Causing pain to her neck and bilateral arms.  Unfortunately, patient received excellent pain relief during the physical therapy however it was temporary.  Once she stopped the pain returned.  During last office visit her pain score was a 7/10 on the NRS S. she reports standing, walking too long driving for long periods of time or looking side-to-side will also exacerbate her pain.  Her pain remains located to her posterior neck that would radiate into her bilateral trapezius muscles and shoulders.  There was numbness in her 1st 2nd 3rd fingers more so on the left than the right.  Patient was also seen by Neurosurgery nurse practitioner.  MRI cervical spine 2025 showed pertinent findings at C2-C3 with a broad based  "disc osteophyte complex causing moderately severe central canal stenosis/cord impingement with only a small sliver preserved subarachnoid space noted to the right of the cord.  She also had severe bilateral foraminal stenosis in the region.  Patient has been dropping items in her hands and continues to deny urinary retention or fecal incontinence as well as saddle anesthesia.      Past Surgical History:   Procedure Laterality Date    ANKLE FUSION Right     CATARACT EXTRACTION Bilateral     CERVICAL FUSION  2014    C3-4, C4-5 ACDF . Dr. Trujillo    CHOLECYSTECTOMY      COLONOSCOPY  11/30/2015    DILATION AND CURETTAGE OF UTERUS      HYSTERECTOMY      procedure for ureter stenosis      1980's.  Dr. Elkins    STENT, RENAL Right     TRANSFORAMINAL EPIDURAL INJECTION OF STEROID Bilateral 08/19/2024    Procedure: INJECTION, STEROID, EPIDURAL, TRANSFORAMINAL APPROACH  ///Rt L4 & Lt L5;  Surgeon: Marycruz Atwood MD;  Location: [facility];  Service: Pain Management;  Laterality: Bilateral;  TFESI Rt L4 & Lt L5    TUBAL LIGATION          Past Medical History:   Diagnosis Date    CKD (chronic kidney disease) stage 2, GFR 60-89 ml/min     DM (diabetes mellitus)     HLD (hyperlipidemia)     HTN (hypertension)         OBJECTIVE:    Visit Vitals  /77 (BP Location: Left arm, Patient Position: Sitting)   Pulse 85   Temp 98.1 °F (36.7 °C)   Ht 5' 4" (1.626 m)   Wt 71.7 kg (158 lb)   BMI 27.12 kg/m²     Vitals:    02/27/25 1323   PainSc:   8       Physical Exam:   General: Well-developed, well-nourished.  Neuro: Alert and oriented x 3.  Psych: Normal mood and affect.  HEENT: Normocephalic. PERRLA EOM intact. Nose and throat clear.  Lungs: Clear to auscultation and percussion.  Heart: Regular rate and rhythm   Abdomen: Soft non-tender. Bowel sounds positive. No rebound tenderness.  Skin: No rashes or open wounds  Musculoskeletal: Bilateral cervical paraspinal banding.  Equal handgrip strength bilaterally.  Limited and painful " range of motion with lateral turning of neck and flexion and extension.  DTRs 3+ bilateral patella  ASSESSMENT:  1. Cervical radiculopathy    2. Cervical disc displacement       PLAN:  Plan for to proceed with interlaminar cervical epidural steroid injection C7-T1 on 03/12/2025.   [x] The patient has been given preoperative instructions and prescriptions for post-operative medication.   [x] Medications to hold:  Aspirin 81 mg 7 days prior to the procedure and resume postop.  [] Cardiac clearance received and reviewed.  [x] Post-operative appointment is scheduled for 2 weeks.

## 2025-03-07 ENCOUNTER — OFFICE VISIT (OUTPATIENT)
Dept: FAMILY MEDICINE | Facility: CLINIC | Age: 75
End: 2025-03-07
Payer: MEDICARE

## 2025-03-07 ENCOUNTER — HOSPITAL ENCOUNTER (OUTPATIENT)
Dept: RADIOLOGY | Facility: HOSPITAL | Age: 75
Discharge: HOME | End: 2025-03-07
Payer: MEDICARE

## 2025-03-07 ENCOUNTER — OFFICE VISIT (OUTPATIENT)
Dept: NEPHROLOGY | Facility: CLINIC | Age: 75
End: 2025-03-07
Payer: MEDICARE

## 2025-03-07 VITALS
HEIGHT: 64 IN | TEMPERATURE: 98 F | HEART RATE: 69 BPM | BODY MASS INDEX: 26.98 KG/M2 | WEIGHT: 158 LBS | RESPIRATION RATE: 20 BRPM | DIASTOLIC BLOOD PRESSURE: 68 MMHG | SYSTOLIC BLOOD PRESSURE: 109 MMHG | OXYGEN SATURATION: 98 %

## 2025-03-07 VITALS
SYSTOLIC BLOOD PRESSURE: 137 MMHG | WEIGHT: 159.19 LBS | BODY MASS INDEX: 27.18 KG/M2 | RESPIRATION RATE: 18 BRPM | OXYGEN SATURATION: 100 % | HEIGHT: 64 IN | HEART RATE: 68 BPM | TEMPERATURE: 98 F | DIASTOLIC BLOOD PRESSURE: 75 MMHG

## 2025-03-07 DIAGNOSIS — E66.3 OVERWEIGHT (BMI 25.0-29.9): ICD-10-CM

## 2025-03-07 DIAGNOSIS — E11.22 STAGE 2 CHRONIC KIDNEY DISEASE DUE TO TYPE 2 DIABETES MELLITUS: ICD-10-CM

## 2025-03-07 DIAGNOSIS — E67.3 HYPERVITAMINOSIS D: ICD-10-CM

## 2025-03-07 DIAGNOSIS — M25.531 RIGHT WRIST PAIN: Primary | ICD-10-CM

## 2025-03-07 DIAGNOSIS — M25.531 RIGHT WRIST PAIN: ICD-10-CM
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
## 2025-03-07 DIAGNOSIS — N18.2 STAGE 2 CHRONIC KIDNEY DISEASE DUE TO TYPE 2 DIABETES MELLITUS: ICD-10-CM

## 2025-03-07 DIAGNOSIS — I10 ESSENTIAL HYPERTENSION: Primary | ICD-10-CM

## 2025-03-07 DIAGNOSIS — E78.2 MIXED HYPERLIPIDEMIA: ICD-10-CM

## 2025-03-07 DIAGNOSIS — N18.2 TYPE 2 DIABETES MELLITUS WITH STAGE 2 CHRONIC KIDNEY DISEASE, WITHOUT LONG-TERM CURRENT USE OF INSULIN: ICD-10-CM

## 2025-03-07 DIAGNOSIS — E11.22 TYPE 2 DIABETES MELLITUS WITH STAGE 2 CHRONIC KIDNEY DISEASE, WITHOUT LONG-TERM CURRENT USE OF INSULIN: ICD-10-CM

## 2025-03-07 PROCEDURE — 99214 OFFICE O/P EST MOD 30 MIN: CPT | Mod: PBBFAC | Performed by: INTERNAL MEDICINE

## 2025-03-07 PROCEDURE — 73130 X-RAY EXAM OF HAND: CPT | Mod: TC,RT

## 2025-03-07 PROCEDURE — 99215 OFFICE O/P EST HI 40 MIN: CPT | Mod: PBBFAC,25,27

## 2025-03-07 PROCEDURE — 73110 X-RAY EXAM OF WRIST: CPT | Mod: TC,RT

## 2025-03-07 NOTE — PROGRESS NOTES
[facility] NEPHROLOGY  OUTPATIENT OFFICE VISIT NOTE    SUBJECTIVE:      HPI: Ms. Briggs is a 71-year-old  female with past medical history of diabetes mellitus type 2 (diagnosed in her 20s), chronic kidney disease, hypertension, dyslipidemia, and obesity. Presents for follow-up appointment in Nephrology Clinic.     Patient reports feeling well since her last visit. She reports good control of her blood pressure and her blood sugars at home; most recent BG this morning was 110. She does report swelling and pain of the right wrist and hand due to a fall about a month ago. Sensation and strength are intact with full ROM. Instructed patient to reach out to PCP or have hand evaluated at urgent care. She is still receiving injections for chronic pain, which as helped, along with taking Norco 5 PRN and Tylenol for pain. Explained to take her medications with caution as they can build up due to her renal dysfunction which the patient understands. Encouraged patient to take magnesium supplements daily as Mg was 1.4 on labs.     ROS:  Constitutional: no fever, fatigue, weakness  Respiratory: no cough, no wheezing, no shortness of breath  Cardiovascular: no chest pain, no palpitations, no edema  Gastrointestinal: no nausea, vomiting, or diarrhea. No abdominal pain  Genitourinary: no dysuria, no urinary frequency or urgency, no hematuria  Musculoskeletal: +chronic lower back/leg pain  Integumentary: no skin rash or abnormal lesion  Neurologic: no headache, no dizziness, no weakness or numbness    OBJECTIVE:     Vital signs:   There were no vitals taken for this visit.     Physical Exam  Constitutional:       General: She is not in acute distress.  Cardiovascular:      Rate and Rhythm: Normal rate.      Pulses: Normal pulses.      Heart sounds: No murmur heard.     No friction rub. No gallop.   Pulmonary:      Breath sounds: No wheezing, rhonchi or rales.   Abdominal:      General: There is no distension.       Palpations: Abdomen is soft.      Tenderness: There is no abdominal tenderness. There is no right CVA tenderness or left CVA tenderness.      Comments: No suprapubic tenderness   Musculoskeletal:      Right wrist: Swelling and bony tenderness present. Normal range of motion.      Right hand: Bony tenderness present. Normal range of motion. Normal strength. Normal sensation.   Skin:     General: Skin is warm and dry.   Neurological:      General: No focal deficit present.      Mental Status: She is alert. Mental status is at baseline.       ASSESSMENT & PLAN:     CKD Stage II IIIb  - BUN/Cr displayed interval improvement eGFR now at 59  - Urine protein/creatinine ratio WNL  - Vit D 89, intact PTH 32.6  - follow up in 5 months with labs prior  - Continue:    -follow 2 g a day dietary sodium restriction    -controlled diabetes (goal A1c less than 7%)    -control high blood pressure (goal blood pressure is less than 130/80, please check blood pressure twice a week and bring blood pressure logs to office visit)    -exercise at least 30 minutes a day, 5 days a week    -maintain healthy weight    -decrease or stop alcohol use    -do not smoke    -stay well hydrated (drink water only, avoid juices, sweet tea, and sodas)    -ask about staying up-to-date on vaccinations (flu vaccine, pneumonia vaccine, hepatitis B vaccine)    -avoid excessive use of NSAIDs (ibuprofen, naproxen, Aleve, Advil, Toradol, Mobic), take Tylenol as needed for headache or mild pain    -take cholesterol lowering medications if prescribed (LDL goal less than 100)    Diabetes Mellitus type II  - A1c 7.5% on 01/08/25, well-controlled for age  - Continue on diabetic regimen per PCP   - Educated on healthy diet and exercise     HTN  - /75 today in clinic  - Continue on Lisinopril and coreg  - Educated on DASH diet and exercise     HLD  - Last LDL 27  - Continue Lipitor 80 mg  - Continue to monitor, LDL at goal <70    Obesity  - Encouraged healthy low  fat diet and exercise   - Continue to monitor     Return to clinic in 5 months    Evi Smalls MD  [facility] Internal Medicine, [facility]

## 2025-03-08 ENCOUNTER — TELEPHONE (OUTPATIENT)
Dept: FAMILY MEDICINE | Facility: CLINIC | Age: 75
End: 2025-03-08
Payer: MEDICARE

## 2025-03-08 NOTE — TELEPHONE ENCOUNTER
Spoke to patient over the phone regarding her xray results from 3/7/25 of her right hand and wrist. ER precautions were discussed with patient in depth. Patient was given opportunity to ask questions at this time.       Esperanza Pandey,   3/8/25

## 2025-03-08 NOTE — PROGRESS NOTES
"[facility] OFFICE VISIT NOTE  MRN: [id number]  Date: 03/07/2025    Chief Complaint: Hand Pain (Right hand pain and swelling due to a fall on 2/17/25, rates pain 5/10)      Subjective:    HPI  Marnie Briggs is a 74 y.o. female  presenting to [facility] for :    Right wrist pain:  - pain has progressively worsened over the last month since patient initially fell forward after tripping  - patient states she fell on her knees but thinks she caught herself with her hands also  - pain was not instantly after fall buts started a week or two afterwards  - she says that pain is 5/10 currently and she takes Norco 5 mg q12 h for chronic lumbar back pain   - she's denies crepitus, decreased ROM, loss of sensation, tingling; denies hitting her head during fall or loss of consciousness  - had bruising initially which has resolved but states swelling to dorsal right hand has worsened since the fall  - she was never evaluated after this fall and has not had any xrays done on the extremity      ROS per HPI above.    Current Medications:     Objective:  Vitals:    03/07/25 0918   BP: 109/68   BP Location: Left arm   Patient Position: Sitting   Pulse: 69   Resp: 20   Temp: 98.1 °F (36.7 °C)   TempSrc: Oral   SpO2: 98%   Weight: 71.7 kg (158 lb)   Height: 5' 4" (1.626 m)       Physical Exam  Constitutional:       General: She is not in acute distress.     Appearance: She is not ill-appearing.   Eyes:      General: No scleral icterus.     Extraocular Movements: Extraocular movements intact.      Conjunctiva/sclera: Conjunctivae normal.      Pupils: Pupils are equal, round, and reactive to light.   Cardiovascular:      Rate and Rhythm: Normal rate and regular rhythm.      Comments: 2+ radial pulses palpable B/L  Abdominal:      General: There is no distension.      Palpations: Abdomen is soft.      Tenderness: There is no abdominal tenderness. There is no guarding or rebound.   Musculoskeletal:      Comments: Normal ROM with flexion and " extension of right wrist with mild pain elicited with motion. 5/5/  strength B/L. Patient able to make complete fist. Swelling noted to dorsal right hand and at 4th and 5th MCP joints without bruising or unusual skin discoloration. Nontender to palpation at area of snuff box or hypothenar eminence or over scaphoid centrally. Ulnar deviation noted B/L.   Neurological:      Mental Status: She is alert.       Assessment/ Plan:    Right wrist pain  Fall, initial encounter  - X-Ray Wrist Complete Right and X-Ray Hand Complete Right ordered  - patient agreeable to get imaging completed after clinic visit; will follow up results as indicated and call patient with results  - patient instructed to take Tylenol for her pain and be cautious with NSAIDs due to history of CKD  - patient voiced her understanding and agreement of this plan    Follow up for 1 week follow up for wrist pain.       Esperanza Pandey DO  [employer] FM Resident, [facility]

## 2025-03-12 ENCOUNTER — ANESTHESIA EVENT (OUTPATIENT)
Dept: SURGERY | Facility: HOSPITAL | Age: 75
End: 2025-03-12
Payer: MEDICARE

## 2025-03-12 ENCOUNTER — HOSPITAL ENCOUNTER (OUTPATIENT)
Facility: HOSPITAL | Age: 75
Discharge: HOME | End: 2025-03-12
Attending: ANESTHESIOLOGY | Admitting: ANESTHESIOLOGY
Payer: MEDICARE

## 2025-03-12 ENCOUNTER — ANESTHESIA (OUTPATIENT)
Dept: SURGERY | Facility: HOSPITAL | Age: 75
End: 2025-03-12
Payer: MEDICARE

## 2025-03-12 DIAGNOSIS — G89.29 CHRONIC NECK PAIN: ICD-10-CM

## 2025-03-12 DIAGNOSIS — M54.2 CHRONIC NECK PAIN: ICD-10-CM

## 2025-03-12 DIAGNOSIS — M54.2 NECK PAIN: Primary | ICD-10-CM

## 2025-03-12 LAB — POCT GLUCOSE: 109 MG/DL (ref 70–110)

## 2025-03-12 PROCEDURE — 62321 NJX INTERLAMINAR CRV/THRC: CPT | Performed by: ANESTHESIOLOGY

## 2025-03-12 PROCEDURE — 37000008 HC ANESTHESIA 1ST 15 MINUTES: Performed by: ANESTHESIOLOGY

## 2025-03-12 PROCEDURE — 62321 NJX INTERLAMINAR CRV/THRC: CPT | Mod: ,,, | Performed by: ANESTHESIOLOGY

## 2025-03-12 PROCEDURE — 82962 GLUCOSE BLOOD TEST: CPT | Performed by: ANESTHESIOLOGY

## 2025-03-12 PROCEDURE — 25000003 PHARM REV CODE 250: Performed by: NURSE ANESTHETIST, CERTIFIED REGISTERED

## 2025-03-12 PROCEDURE — 63600175 PHARM REV CODE 636 W HCPCS: Performed by: NURSE ANESTHETIST, CERTIFIED REGISTERED

## 2025-03-12 RX ORDER — SODIUM CHLORIDE, SODIUM GLUCONATE, SODIUM ACETATE, POTASSIUM CHLORIDE AND MAGNESIUM CHLORIDE 30; 37; 368; 526; 502 MG/100ML; MG/100ML; MG/100ML; MG/100ML; MG/100ML
INJECTION, SOLUTION INTRAVENOUS CONTINUOUS
OUTPATIENT
Start: 2025-03-12 | End: 2025-04-11

## 2025-03-12 RX ORDER — LIDOCAINE HYDROCHLORIDE 10 MG/ML
1 INJECTION, SOLUTION EPIDURAL; INFILTRATION; INTRACAUDAL; PERINEURAL ONCE
OUTPATIENT
Start: 2025-03-12 | End: 2025-03-12

## 2025-03-12 RX ORDER — GLUCAGON 1 MG
1 KIT INJECTION
OUTPATIENT
Start: 2025-03-12

## 2025-03-12 RX ORDER — ONDANSETRON HYDROCHLORIDE 2 MG/ML
4 INJECTION, SOLUTION INTRAVENOUS DAILY PRN
OUTPATIENT
Start: 2025-03-12

## 2025-03-12 RX ORDER — DIPHENHYDRAMINE HYDROCHLORIDE 50 MG/ML
25 INJECTION, SOLUTION INTRAMUSCULAR; INTRAVENOUS EVERY 6 HOURS PRN
OUTPATIENT
Start: 2025-03-12

## 2025-03-12 RX ORDER — PROPOFOL 10 MG/ML
VIAL (ML) INTRAVENOUS
Status: DISCONTINUED | OUTPATIENT
Start: 2025-03-12 | End: 2025-03-12

## 2025-03-12 RX ADMIN — SODIUM CHLORIDE: 9 INJECTION, SOLUTION INTRAVENOUS at 09:03

## 2025-03-12 RX ADMIN — PROPOFOL 70 MG: 10 INJECTION, EMULSION INTRAVENOUS at 09:03

## 2025-03-12 NOTE — ANESTHESIA POSTPROCEDURE EVALUATION
Anesthesia Post Evaluation    Patient: Marnie Briggs    Procedure(s) Performed: Procedure(s) (LRB):  INJECTION, STEROID, SPINE, CERVICAL, EPIDURAL / SUKHWINDER C7-T1 (N/A)    Final Anesthesia Type: general      Patient location during evaluation: PACU  Patient participation: Yes- Able to Participate  Level of consciousness: awake and alert  Post-procedure vital signs: reviewed and stable  Pain management: adequate  Airway patency: patent    PONV status at discharge: No PONV  Anesthetic complications: no      Cardiovascular status: blood pressure returned to baseline and stable  Respiratory status: unassisted  Hydration status: euvolemic  Follow-up not needed.              Vitals Value Taken Time   /91 03/12/25 07:40   Temp 36.8 °C (98.2 °F) 03/12/25 07:40   Pulse 60 03/12/25 07:40   Resp 18 03/12/25 07:43   SpO2 97 % 03/12/25 07:40         No case tracking events are documented in the log.      Pain/Noe Score: No data recorded

## 2025-03-12 NOTE — ANESTHESIA PREPROCEDURE EVALUATION
03/12/2025  Marnie Briggs is a 74 y.o., female, who presents with chronic pain for repeat epidural steroid injection as listed.  She has done well in the past with similar procedure.      Pre-op Assessment    I have reviewed the Patient Summary Reports.     I have reviewed the Nursing Notes. I have reviewed the NPO Status.   I have reviewed the Medications.     Review of Systems  Anesthesia Hx:  No problems with previous Anesthesia             Denies Family Hx of Anesthesia complications.    Denies Personal Hx of Anesthesia complications.                    Social:  Non-Smoker       Cardiovascular:     Hypertension           hyperlipidemia    Bilat MARCUS                           Pulmonary:  Pulmonary Normal                       Renal/:  Chronic Renal Disease   CKD2             Hepatic/GI:     GERD                Musculoskeletal:  Arthritis               Endocrine:  Diabetes, type 2   Hashimoto thyroiditis        Psych:    depression                Physical Exam  General: Alert and Oriented    Airway:  Mallampati: II   Mouth Opening: Normal  TM Distance: Normal  Tongue: Normal  Neck ROM: Normal ROM    Dental:  Intact  Age Related Wear  Chest/Lungs:  Normal Respiratory Rate    Heart:  Rate: Normal  Rhythm: Regular Rhythm        Anesthesia Plan  Type of Anesthesia, risks & benefits discussed:    Anesthesia Type: Gen Natural Airway  Intra-op Monitoring Plan: Standard ASA Monitors  Post Op Pain Control Plan: IV/PO Opioids PRN  Induction:  IV  Airway Plan: Direct  Informed Consent: Informed consent signed with the Patient and all parties understand the risks and agree with anesthesia plan.  All questions answered. Patient consented to blood products? No  ASA Score: 3  Day of Surgery Review of History & Physical: H&P Update referred to the surgeon/provider.  Anesthesia Plan Notes: Nasal cannula vs facemask  supplemental oxygenation         Ready For Surgery From Anesthesia Perspective.     .

## 2025-03-12 NOTE — DISCHARGE SUMMARY
[facility] Surgical - Periop Services  Discharge Note  Short Stay    Procedure(s) (LRB):  INJECTION, STEROID, SPINE, CERVICAL, EPIDURAL / SUKHWINDER C7-T1 (N/A)      OUTCOME: Patient tolerated treatment/procedure well without complication and is now ready for discharge.    DISPOSITION: Home or Self Care    FINAL DIAGNOSIS:  <principal problem not specified>    FOLLOWUP: In clinic    DISCHARGE INSTRUCTIONS:  No discharge procedures on file.     TIME SPENT ON DISCHARGE: 5 minutes

## 2025-03-12 NOTE — TRANSFER OF CARE
"Anesthesia Transfer of Care Note    Patient: Marnie Briggs    Procedure(s) Performed: Procedure(s) (LRB):  INJECTION, STEROID, SPINE, CERVICAL, EPIDURAL / SUKHWINDER C7-T1 (N/A)    Patient location: OPS    Anesthesia Type: general    Transport from OR: Transported from OR on room air with adequate spontaneous ventilation    Post pain: adequate analgesia    Post assessment: no apparent anesthetic complications    Post vital signs: stable    Level of consciousness: awake    Nausea/Vomiting: no nausea/vomiting    Complications: none    Transfer of care protocol was followed      Last vitals: Visit Vitals  BP (!) 186/91   Pulse 60   Temp 36.8 °C (98.2 °F) (Oral)   Resp 18   Ht 5' 4" (1.626 m)   Wt 73.8 kg (162 lb 11.2 oz)   SpO2 97%   Breastfeeding No   BMI 27.93 kg/m²     "

## 2025-03-13 ENCOUNTER — OFFICE VISIT (OUTPATIENT)
Dept: FAMILY MEDICINE | Facility: CLINIC | Age: 75
End: 2025-03-13
Payer: MEDICARE

## 2025-03-13 VITALS
BODY MASS INDEX: 27.85 KG/M2 | DIASTOLIC BLOOD PRESSURE: 82 MMHG | TEMPERATURE: 98 F | HEIGHT: 64 IN | OXYGEN SATURATION: 98 % | SYSTOLIC BLOOD PRESSURE: 150 MMHG | HEART RATE: 77 BPM | WEIGHT: 163.13 LBS

## 2025-03-13 DIAGNOSIS — I10 HYPERTENSION, UNSPECIFIED TYPE: ICD-10-CM

## 2025-03-13 DIAGNOSIS — M51.361 DEGENERATION OF INTERVERTEBRAL DISC OF LUMBAR REGION WITH LOWER EXTREMITY PAIN: ICD-10-CM

## 2025-03-13 DIAGNOSIS — R29.6 FREQUENT FALLS: ICD-10-CM

## 2025-03-13 DIAGNOSIS — M25.531 RIGHT WRIST PAIN: Primary | ICD-10-CM

## 2025-03-13 PROCEDURE — 99213 OFFICE O/P EST LOW 20 MIN: CPT | Mod: PBBFAC | Performed by: FAMILY MEDICINE
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
## 2025-03-15 VITALS
HEART RATE: 68 BPM | HEIGHT: 64 IN | WEIGHT: 162.69 LBS | OXYGEN SATURATION: 99 % | TEMPERATURE: 98 F | BODY MASS INDEX: 27.77 KG/M2 | SYSTOLIC BLOOD PRESSURE: 166 MMHG | RESPIRATION RATE: 20 BRPM | DIASTOLIC BLOOD PRESSURE: 84 MMHG

## 2025-03-21 DIAGNOSIS — G89.29 CHRONIC RIGHT-SIDED LOW BACK PAIN WITH RIGHT-SIDED SCIATICA: Chronic | ICD-10-CM

## 2025-03-21 DIAGNOSIS — M54.41 CHRONIC RIGHT-SIDED LOW BACK PAIN WITH RIGHT-SIDED SCIATICA: Chronic | ICD-10-CM

## 2025-03-21 DIAGNOSIS — M51.369 LUMBAR DEGENERATIVE DISC DISEASE: ICD-10-CM

## 2025-03-25 RX ORDER — HYDROCODONE BITARTRATE AND ACETAMINOPHEN 5; 325 MG/1; MG/1
1 TABLET ORAL EVERY 12 HOURS PRN
Qty: 60 TABLET | Refills: 0 | Status: SHIPPED | OUTPATIENT
Start: 2025-03-25

## 2025-03-25 RX ORDER — PEN NEEDLE, DIABETIC 29 G X1/2"
NEEDLE, DISPOSABLE MISCELLANEOUS DAILY
Qty: 90 EACH | Refills: 3 | OUTPATIENT
Start: 2025-03-25

## 2025-03-27 ENCOUNTER — HOSPITAL ENCOUNTER (OUTPATIENT)
Dept: RADIOLOGY | Facility: HOSPITAL | Age: 75
Discharge: HOME | End: 2025-03-27
Attending: PHYSICIAN ASSISTANT
Payer: MEDICARE

## 2025-03-27 DIAGNOSIS — M48.02 SPINAL STENOSIS, CERVICAL REGION: ICD-10-CM

## 2025-03-27 PROCEDURE — 72125 CT NECK SPINE W/O DYE: CPT | Mod: TC

## 2025-04-01 ENCOUNTER — OFFICE VISIT (OUTPATIENT)
Facility: CLINIC | Age: 75
End: 2025-04-01
Payer: MEDICARE

## 2025-04-01 VITALS
WEIGHT: 163.13 LBS | BODY MASS INDEX: 27.85 KG/M2 | HEART RATE: 64 BPM | DIASTOLIC BLOOD PRESSURE: 60 MMHG | SYSTOLIC BLOOD PRESSURE: 134 MMHG | HEIGHT: 64 IN

## 2025-04-01 DIAGNOSIS — M54.2 CERVICALGIA: ICD-10-CM

## 2025-04-01 DIAGNOSIS — G89.4 CHRONIC PAIN SYNDROME: ICD-10-CM

## 2025-04-01 DIAGNOSIS — M50.20 CERVICAL DISC DISPLACEMENT: ICD-10-CM

## 2025-04-01 DIAGNOSIS — M54.12 CERVICAL RADICULOPATHY: Primary | ICD-10-CM

## 2025-04-01 PROCEDURE — 4010F ACE/ARB THERAPY RXD/TAKEN: CPT | Mod: CPTII,,, | Performed by: NURSE PRACTITIONER

## 2025-04-01 PROCEDURE — 3051F HG A1C>EQUAL 7.0%<8.0%: CPT | Mod: CPTII,,, | Performed by: NURSE PRACTITIONER

## 2025-04-01 PROCEDURE — 3078F DIAST BP <80 MM HG: CPT | Mod: CPTII,,, | Performed by: NURSE PRACTITIONER

## 2025-04-01 PROCEDURE — 3288F FALL RISK ASSESSMENT DOCD: CPT | Mod: CPTII,,, | Performed by: NURSE PRACTITIONER

## 2025-04-01 PROCEDURE — 3008F BODY MASS INDEX DOCD: CPT | Mod: CPTII,,, | Performed by: NURSE PRACTITIONER

## 2025-04-01 PROCEDURE — 1125F AMNT PAIN NOTED PAIN PRSNT: CPT | Mod: CPTII,,, | Performed by: NURSE PRACTITIONER

## 2025-04-01 PROCEDURE — 1160F RVW MEDS BY RX/DR IN RCRD: CPT | Mod: CPTII,,, | Performed by: NURSE PRACTITIONER

## 2025-04-01 PROCEDURE — 1159F MED LIST DOCD IN RCRD: CPT | Mod: CPTII,,, | Performed by: NURSE PRACTITIONER

## 2025-04-01 PROCEDURE — 3075F SYST BP GE 130 - 139MM HG: CPT | Mod: CPTII,,, | Performed by: NURSE PRACTITIONER

## 2025-04-01 PROCEDURE — 99214 OFFICE O/P EST MOD 30 MIN: CPT | Mod: ,,, | Performed by: NURSE PRACTITIONER

## 2025-04-01 PROCEDURE — 1101F PT FALLS ASSESS-DOCD LE1/YR: CPT | Mod: CPTII,,, | Performed by: NURSE PRACTITIONER

## 2025-04-01 PROCEDURE — 3066F NEPHROPATHY DOC TX: CPT | Mod: CPTII,,, | Performed by: NURSE PRACTITIONER

## 2025-04-01 RX ORDER — LANOLIN ALCOHOL/MO/W.PET/CERES
100 CREAM (GRAM) TOPICAL DAILY
COMMUNITY

## 2025-04-01 RX ORDER — MULTIVITAMIN WITH IRON
1 TABLET ORAL DAILY
COMMUNITY

## 2025-04-01 NOTE — PROGRESS NOTES
[facility]    Subjective:     Chief Complaint: Post-op Evaluation and Neck Pain (Post-op SUKHWINDER C7-T1 3/12/25, pt states she received some relief has slight pain, OTC and RX medication for relief, pain level 5/10)    Referred by: No ref. provider found     History of Present Illness: Marnie Briggs is a 74 y.o. female presents today for pain associated with cervicalgia and radiculopathy along with a cervical disc displacement after receiving a cervical epidural steroid injection C7-T1 on 03/12/2025.  Patient has a follow-up appointment with neurosurgeon, Dr. Hernandez tomorrow.  She reports a proximally 50% reduction in pain to her neck.  However, if she is turning her neck a lot and using her hands a good bit this can elevate to a 5/10 up to a 7/10 on the NRS.  Today her pain score is a 5/10 on the NRS.  Overall she does like the pain relief that she has received in his area.  She also has been sleeping with a C-shaped pillow which is also assisted her pain reduction.  She also relayed that her pain is not as intense when she turns her neck laterally as it was prior to her epidural steroid injection.  She denies urinary retention, fecal incontinence and worsening weakness to her hands.  She has not seen Dr. Atwood in the clinic in a while in his interested in following up with her in three-month to evaluate her cervical radiculopathy pain.  Patient was commended on her weight loss of about 100 lb years ago as she was up to 200 lb.  She was able to do this independently.         History of Present Illness during neurosurgery follow-up appointment on 02/04/2025 included the following:   : Marnie Briggs is a 74 y.o. female presents today as a two-month follow-up after completing physical therapy for neck and bilateral arm pain.  Unfortunately, patient received good pain relief during physical therapy however it was temporary.  Once they stopped the pain returned.  Unfortunately her pain score today is a  7/10.  Standing, walking too long will exacerbate her pain.  It also interrupts her sleep.  Her pain remains located to her posterior neck pain  radiating into bilateral trapezius muscles and shoulders.  There is numbness in the 1st, 2nd, and 3rd fingers more so on the left than right. Patient was last seen with Neurosurgery nurse practitioner Marnie jones yesterday with the following HPI obtained: Marnie Briggs is a 74 y.o. female who presents for follow up appointment with cervical MRI and x-ray.  Patient was seen initially by me on 01/16/2025 in regards to her lumbar spine.  Due to hyperreflexia, cervical imaging was ordered.  She returns today to review studies.  She complains of neck, bilateral scapula and arm pain.  MRI cervical spine was ordered per Marnie jnoes, nurse practitioner with Neurosurgery.  MRI cervical spine 2025 showed pertinent findings at C2-C3: Broad-based disc osteophyte causing moderately severe central canal stenosis/cord impingement with only a small sliver of preserved subarachnoid space noted to the right of the cord.  Severe bilateral foraminal stenosis.  Patient denies urinary retention or fecal incontinence as well as saddle anesthesia.  She is dropping items at times in her hands.  She reports when she holds keys or other items in her hand it feels funny.  She denies any spasms at this time.  Patient has pertinent past surgical history of Cervical fusion C3-C4, C4-C5, (ACDF).  Today she wanted to focus more on her neck arm and hand pain versus her low back and leg pain.  She plans on discussing this with her children and praying about receiving an interlaminar cervical epidural steroid injection C7-T1 to see if this will help her pain and spinal cord stenosis.  Her pain score today as a 7/10.  This will elevate to a 10/10 with prolonged driving, looking laterally as well as cervical flexion and extension will elevate her pain as well.  She feels numbness and tingling with the  "discomfort radiated down her arms into her fingers.  She also feels pain in her bilateral scapula region.    Pertinent PMH:  CKD stage 2, diabetes mellitus, hyperlipidemia, hypertension  Pertinent PSH:  Cervical fusion C3-C4, C4-C5, (ACDF) ankle fusion right, bilateral cataract extraction, no spinal cord stimulator, pacemaker or defibrillator          Vital signs:   Visit Vitals  /60 (BP Location: Left arm, Patient Position: Sitting)   Pulse 64   Ht 5' 4" (1.626 m)   Wt 74 kg (163 lb 2.3 oz)   BMI 28.00 kg/m²      Vitals:    04/01/25 0927   PainSc:   5     Pain Disability Index (PDI): 52       Interventional Pain History  03/12/2025: SUKHWINDER C7-T1  08/19/2024: Right L4 +left L5 TFESI (2 weeks pain relief)    ROS:  Neck, bilateral shoulder and arm pain     Cervical MRI 2025:   FINDINGS:  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     Prior ACDF at C3-C5.     No acute fracture.     No aggressive marrow signal.     No intrinsic abnormality of the cervical cord.     No acute or significant paraspinous soft tissue abnormality.     C1-C2: Chronic right ventral epidural soft tissue thickening causing effacement of the right ventral thecal sac and flattening of the right ventral cord.     C2-C3: Broad-based disc osteophyte causing moderately severe central canal stenosis/cord impingement with only a small sliver of preserved subarachnoid space noted to the right of the cord.  Severe bilateral foraminal stenosis.     C3-C4: ACDF.  Left facet joint degeneration causing mild left foraminal stenosis.  Central canal and right foramina are patent.     C4-C5: ACDF.  Moderate left foraminal stenosis due to left facet hypertrophy.  Central canal and right foramina are patent.     C5-C6: Disc degeneration which is difficult to quantify due to artifact.  Central canal and foramina are patent.     C6-C7: Severe disc degeneration, degenerative anterolisthesis, and broad-based disc osteophyte " causing mild to moderate central canal stenosis and moderate bilateral foraminal stenosis.     C7-T1: Severe disc degeneration, degenerative anterolisthesis, bilateral uncovertebral hypertrophy, and bilateral facet joint degeneration causing moderate bilateral foraminal stenosis.  Central canal remains patent.     Impression:     C2-C3 broad-based disc osteophyte causing moderately severe central canal stenosis/cord impingement and severe bilateral foraminal stenosis.     C6-C7 mild to moderate central canal stenosis and moderate bilateral foraminal stenosis.     C7-T1 moderate bilateral foraminal stenosis.     C4-C5 moderate left foraminal stenosis.     Prior ACDF at C3-C4 and C4-C5.     Chronic soft tissue thickening of the right ventral epidural space at C1-C2 which effaces the right ventral thecal sac and impinges the right ventral cord.     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        Electronically signed by:Aquiles Boland  Date:                                            01/31/2025  Time:                                           09:49        MRI lumbar spine 2024:  FINDINGS:  There is grade 1 anterolisthesis of L4 over L5.  The vertebral body heights are maintained.  There is no bone marrow edema.     The conus terminates at the level of L1.  It is normal in signal and contour.  There is no epidural fluid collection.     Disc spaces, spinal canal and neural foramina are as follows:     L1-L2: No disc herniation.  No significant spinal canal or neural foraminal stenosis.     L2-L3: Disc bulge and facet hypertrophy with mild narrowing of the spinal canal.  Mild bilateral foraminal stenosis.     L3-L4: Disc bulge and facet hypertrophy with mild narrowing of the spinal canal.  Mild bilateral foraminal stenosis.     L4-L5: Grade 1 anterolisthesis of L4 over L5 with disc bulge, facet hypertrophy and moderate to severe narrowing of the spinal canal.  Moderate to severe  right and moderate left neural foraminal stenosis.     L5-S1: Disc bulge and facet hypertrophy.  No significant spinal canal stenosis.  Mild-to-moderate right and severe left neural foraminal stenosis.     No significant abnormality within the visualized paraspinous musculature.     Impression:     1. Moderate to severe degenerative spinal canal stenosis at L4-5, mild at L2-L4.  2. Multilevel foraminal stenoses as described.        Electronically signed by:Hawa Montiel  Date:                                            06/05/2024  Time:                                           12:44           Objective:        Physical Exam    General: Well developed; normal weight; A&O x 3; No anxiety/depression; NAD  Mental Status: Oriented to person, palce and time. Displays appropriate mood & affect.  Head: Norm cephalic and atraumatic  Neck:  Bilateral cervical paraspinal banding.  Improved range of motion with lateral turning and cervical flexion +extension.  Eyes: normal conjunctiva, normal lids, normal pupils  ENT and mouth: normal external ear, nose, and no lesions noted on the lips.  Respiratory: Symmetrical, Unlabored. No dyspnea  CV: normal rhythm and rate. No peripheral edema.   Abdomen: Non-distended     Extremities:  Gen: No cyanosis or tenderness to palpation bilateral upper and lower extremities  Skin: Warm, pink, dry, no rashes, no lesions on the lumbar spine  Strength: 5/5 motor strength bilateral upper and lower extremities  ROM: Full ROM in bilateral knees without pain or instability.     Neuro:  Gait: no altalgic lean, normal toe and heel raise. Independent ambulator.  DTR's:  3 + in bilateral patellar,   Sensory: Intact to light touch bilateral  upper and lower extremities     Spine: Normal lordosis. No scoliosis  L-spine ROM: limited and painful ROM to flexion, extension, bilateral rotation,   Straight Leg Raise:  +bilaterally  SI Joint: No tenderness to palpation bilaterally.         Assessment:      74  y.o. female presents today for pain associated with cervicalgia and radiculopathy along with a cervical disc displacement after receiving a cervical epidural steroid injection C7-T1 on 03/12/2025.  Patient has a follow-up appointment with neurosurgeon, Dr. Hernandez tomorrow.  She reports a proximally 50% reduction in pain to her neck.  However, if she is turning her neck a lot and using her hands a good bit this can elevate to a 5/10 up to a 7/10 on the NRS.  Today her pain score is a 5/10 on the NRS.  Overall she does like the pain relief that she has received in his area.  She also has been sleeping with a C-shaped pillow which is also assisted her pain reduction.  She also relayed that her pain is not as intense when she turns her neck laterally as it was prior to her epidural steroid injection.  She denies urinary retention, fecal incontinence and worsening weakness to her hands.  She has not seen Dr. Atwood in the clinic in a while in his interested in following up with her in three-month to evaluate her cervical radiculopathy pain.  Patient was commended on her weight loss of about 100 lb years ago as she was up to 200 lb.  She was able to do this independently.          Encounter Diagnoses   Name Primary?    Cervical radiculopathy Yes    Cervical disc displacement     Cervicalgia     Chronic pain syndrome          Plan:       Patient to follow-up in 3 months with Dr. Atwood to evaluate and treat cervical radiculopathy  Follow-up scheduled with Dr. Hernandez tomorrow         Marnie was seen today for post-op evaluation and neck pain.    Diagnoses and all orders for this visit:    Cervical radiculopathy    Cervical disc displacement    Cervicalgia    Chronic pain syndrome           Past Medical History:   Diagnosis Date    Cervical radiculopathy     CKD (chronic kidney disease) stage 2, GFR 60-89 ml/min     DM (diabetes mellitus)     HLD (hyperlipidemia)     HTN (hypertension)        Past Surgical History:    Procedure Laterality Date    ANKLE FUSION Right     CATARACT EXTRACTION Bilateral     CERVICAL FUSION  2014    C3-4, C4-5 ACDF . Dr. Trujillo    CHOLECYSTECTOMY      COLONOSCOPY  11/30/2015    DILATION AND CURETTAGE OF UTERUS      EPIDURAL STEROID INJECTION INTO CERVICAL SPINE N/A 3/12/2025    Procedure: INJECTION, STEROID, SPINE, CERVICAL, EPIDURAL / SUKHWINDER C7-T1;  Surgeon: Marycruz Atwood MD;  Location: [facility] OR;  Service: Pain Management;  Laterality: N/A;  SUKHWINDER C7-T1    HYSTERECTOMY      procedure for ureter stenosis      1980's.  Dr. Elkins    STENT, RENAL Right     TRANSFORAMINAL EPIDURAL INJECTION OF STEROID Bilateral 08/19/2024    Procedure: INJECTION, STEROID, EPIDURAL, TRANSFORAMINAL APPROACH  ///Rt L4 & Lt L5;  Surgeon: Marycruz Atwood MD;  Location: [facility];  Service: Pain Management;  Laterality: Bilateral;  TFESI Rt L4 & Lt L5    TUBAL LIGATION         Family History   Problem Relation Name Age of Onset    Diabetes Mother      Stroke Mother      Hypertension Father      Diabetes Father      Heart disease Father      Cancer Brother      Diabetes Brother         Social History     Socioeconomic History    Marital status:     Number of children: 4   Occupational History    Occupation: disabled   Tobacco Use    Smoking status: Never     Passive exposure: Never    Smokeless tobacco: Never   Substance and Sexual Activity    Alcohol use: Not Currently    Drug use: Never    Sexual activity: Not Currently     Partners: Male     Social Drivers of Health     Financial Resource Strain: Low Risk  (7/10/2024)    Overall Financial Resource Strain (CARDIA)     Difficulty of Paying Living Expenses: Not hard at all   Food Insecurity: No Food Insecurity (7/10/2024)    Hunger Vital Sign     Worried About Running Out of Food in the Last Year: Never true     Ran Out of Food in the Last Year: Never true   Transportation Needs: No Transportation Needs (7/10/2024)    TRANSPORTATION NEEDS     Transportation : No    Physical Activity: Inactive (7/9/2024)    Exercise Vital Sign     Days of Exercise per Week: 0 days     Minutes of Exercise per Session: 0 min   Stress: No Stress Concern Present (7/10/2024)    [employer] - Occupational Stress Questionnaire     Feeling of Stress : Not at all   Housing Stability: Unknown (7/10/2024)    Housing Stability Vital Sign     Unable to Pay for Housing in the Last Year: No     Homeless in the Last Year: No       Current Medications[1]    Review of patient's allergies indicates:  No Known Allergies               [1]   Current Outpatient Medications   Medication Sig Dispense Refill    acetaminophen (TYLENOL) 500 MG tablet Take 500 mg by mouth every 6 (six) hours as needed for Pain.      aspirin 81 MG Chew Take 81 mg by mouth once daily at 6am.      atorvastatin (LIPITOR) 80 MG tablet Take 1 tablet (80 mg total) by mouth once daily. 90 tablet 3    carvediloL (COREG) 12.5 MG tablet Take 1 tablet (12.5 mg total) by mouth 2 (two) times daily. 180 tablet 3    clobetasol 0.05% (TEMOVATE) 0.05 % Oint Apply to affected external vaginal area 2x/week 60 g 6    cyanocobalamin, vitamin B-12, (VITAMIN B-12) 50 mcg tablet Take 50 mcg by mouth once daily.      HYDROcodone-acetaminophen (NORCO) 5-325 mg per tablet Take 1 tablet by mouth every 12 (twelve) hours as needed for Pain. 60 tablet 0    lancets (LANCETS,THIN) Misc 1 Needle by Misc.(Non-Drug; Combo Route) route 2 (two) times a day. 100 each 5    LANTUS SOLOSTAR U-100 INSULIN 100 unit/mL (3 mL) InPn pen Inject 26 Units into the skin once daily. 9 mL 2    LIDOcaine (LIDODERM) 5 % Place 1 patch onto the skin once daily. Remove & Discard patch within 12 hours or as directed by MD 30 patch 5    lisinopriL 10 MG tablet Take 1 tablet (10 mg total) by mouth once daily. 90 tablet 3    loratadine (CLARITIN) 10 mg tablet Take 10 mg by mouth once daily.      magnesium oxide (MAG-OX) 400 mg (241.3 mg magnesium) tablet Take 100 mg by  "mouth once daily.      multivitamin capsule Take 1 capsule by mouth once daily.      omega-3 fatty acids/fish oil (FISH OIL-OMEGA-3 FATTY ACIDS) 300-1,000 mg capsule Take 1 capsule by mouth once daily.      pen needle, diabetic 29 gauge x 1/2" Ndle   Insulin pen needles, See Instructions, [place] pen needles for once a day Lantus injection  E11.65, # 90 EA, 3 Refill(s), Pharmacy: [id number], 162, cm, Height/Length Dosing, 12/10/21 9:34:00 CST, 90.2, kg, Weight Dosing, 12/10/21 9:34:00 CST      vitamin D (VITAMIN D3) 1000 units Tab Take 1,000 Units by mouth once daily.      gabapentin (NEURONTIN) 300 MG capsule Take 1 capsule (300 mg total) by mouth As instructed (back pain). 300mg in the morning and 300mg in the afternoon, take 600mg at night       No current facility-administered medications for this visit.     "

## 2025-04-02 ENCOUNTER — OFFICE VISIT (OUTPATIENT)
Dept: NEUROSURGERY | Facility: CLINIC | Age: 75
End: 2025-04-02
Payer: MEDICARE

## 2025-04-02 VITALS
WEIGHT: 161.19 LBS | DIASTOLIC BLOOD PRESSURE: 88 MMHG | SYSTOLIC BLOOD PRESSURE: 160 MMHG | RESPIRATION RATE: 16 BRPM | HEIGHT: 64 IN | BODY MASS INDEX: 27.52 KG/M2 | HEART RATE: 64 BPM

## 2025-04-02 DIAGNOSIS — M48.02 SPINAL STENOSIS, CERVICAL REGION: Primary | ICD-10-CM

## 2025-04-02 DIAGNOSIS — M47.816 LUMBAR SPONDYLOSIS: ICD-10-CM

## 2025-04-02 PROCEDURE — 3079F DIAST BP 80-89 MM HG: CPT | Mod: CPTII,,, | Performed by: STUDENT IN AN ORGANIZED HEALTH CARE EDUCATION/TRAINING PROGRAM

## 2025-04-02 PROCEDURE — 1101F PT FALLS ASSESS-DOCD LE1/YR: CPT | Mod: CPTII,,, | Performed by: STUDENT IN AN ORGANIZED HEALTH CARE EDUCATION/TRAINING PROGRAM

## 2025-04-02 PROCEDURE — 3077F SYST BP >= 140 MM HG: CPT | Mod: CPTII,,, | Performed by: STUDENT IN AN ORGANIZED HEALTH CARE EDUCATION/TRAINING PROGRAM

## 2025-04-02 PROCEDURE — 3008F BODY MASS INDEX DOCD: CPT | Mod: CPTII,,, | Performed by: STUDENT IN AN ORGANIZED HEALTH CARE EDUCATION/TRAINING PROGRAM

## 2025-04-02 PROCEDURE — 3288F FALL RISK ASSESSMENT DOCD: CPT | Mod: CPTII,,, | Performed by: STUDENT IN AN ORGANIZED HEALTH CARE EDUCATION/TRAINING PROGRAM

## 2025-04-02 PROCEDURE — 99214 OFFICE O/P EST MOD 30 MIN: CPT | Mod: ,,, | Performed by: STUDENT IN AN ORGANIZED HEALTH CARE EDUCATION/TRAINING PROGRAM

## 2025-04-02 PROCEDURE — 1160F RVW MEDS BY RX/DR IN RCRD: CPT | Mod: CPTII,,, | Performed by: STUDENT IN AN ORGANIZED HEALTH CARE EDUCATION/TRAINING PROGRAM

## 2025-04-02 PROCEDURE — 1159F MED LIST DOCD IN RCRD: CPT | Mod: CPTII,,, | Performed by: STUDENT IN AN ORGANIZED HEALTH CARE EDUCATION/TRAINING PROGRAM

## 2025-04-02 PROCEDURE — 3066F NEPHROPATHY DOC TX: CPT | Mod: CPTII,,, | Performed by: STUDENT IN AN ORGANIZED HEALTH CARE EDUCATION/TRAINING PROGRAM

## 2025-04-02 PROCEDURE — 3051F HG A1C>EQUAL 7.0%<8.0%: CPT | Mod: CPTII,,, | Performed by: STUDENT IN AN ORGANIZED HEALTH CARE EDUCATION/TRAINING PROGRAM

## 2025-04-02 PROCEDURE — 1125F AMNT PAIN NOTED PAIN PRSNT: CPT | Mod: CPTII,,, | Performed by: STUDENT IN AN ORGANIZED HEALTH CARE EDUCATION/TRAINING PROGRAM

## 2025-04-02 PROCEDURE — 4010F ACE/ARB THERAPY RXD/TAKEN: CPT | Mod: CPTII,,, | Performed by: STUDENT IN AN ORGANIZED HEALTH CARE EDUCATION/TRAINING PROGRAM

## 2025-04-02 RX ORDER — GABAPENTIN 300 MG/1
300 CAPSULE ORAL 3 TIMES DAILY
COMMUNITY

## 2025-04-02 NOTE — PROGRESS NOTES
"      [facility]  Follow-up visit  Neurosurgery      Marnie Briggs   [id number]   1950       SUBJECTIVE:     CHIEF COMPLAINT:  Low back and leg pain    HPI:  Marnie Briggs is a 74 y.o. female who presents for follow up appointment.   She was last seen in the office on February 4, 2025.      Per prior HPI:  "The patient's main complaint is of lower back pain with pain radiating into bilateral buttocks and posterior thighs.  With walking, she develops increased lower back and posterior thigh pain as well as weakness in her legs.  She also complains of neck pain with pain radiating into bilateral trapezius muscles and shoulders.  There is numbness in the 1st, 2nd, and 3rd fingers more so on the left than right."    Recent C7-T1 SUKHWINDER which provided some relief    She was initially seen for her low back.  Found to have hyperreflexia on exam.  Cervical imaging was obtained which demonstrated C2-C3 stenosis.    During her last visit, CT cervical spine was recommended.    She is here today to review results.  Overall stable fine motor skills and balance. No falls.    Past Medical History:   Diagnosis Date    Cervical radiculopathy     CKD (chronic kidney disease) stage 2, GFR 60-89 ml/min     DM (diabetes mellitus)     HLD (hyperlipidemia)     HTN (hypertension)      Past Surgical History:   Procedure Laterality Date    ANKLE FUSION Right     CATARACT EXTRACTION Bilateral     CERVICAL FUSION  2014    C3-4, C4-5 ACDF . Dr. Trujillo    CHOLECYSTECTOMY      COLONOSCOPY  11/30/2015    DILATION AND CURETTAGE OF UTERUS      EPIDURAL STEROID INJECTION INTO CERVICAL SPINE N/A 3/12/2025    Procedure: INJECTION, STEROID, SPINE, CERVICAL, EPIDURAL / SUKHWINDER C7-T1;  Surgeon: Marycruz Atwood MD;  Location: [facility];  Service: Pain Management;  Laterality: N/A;  SUKHWINDER C7-T1    HYSTERECTOMY      procedure for ureter stenosis      1980's.  Dr. Elkins    STENT, RENAL Right     TRANSFORAMINAL EPIDURAL INJECTION OF STEROID " Bilateral 08/19/2024    Procedure: INJECTION, STEROID, EPIDURAL, TRANSFORAMINAL APPROACH  ///Rt L4 & Lt L5;  Surgeon: Marycruz Atwood MD;  Location: [facility] OR;  Service: Pain Management;  Laterality: Bilateral;  TFESI Rt L4 & Lt L5    TUBAL LIGATION       Family History   Problem Relation Name Age of Onset    Diabetes Mother      Stroke Mother      Hypertension Father      Diabetes Father      Heart disease Father      Cancer Brother      Diabetes Brother       Social History     Socioeconomic History    Marital status:     Number of children: 4   Occupational History    Occupation: disabled   Tobacco Use    Smoking status: Never     Passive exposure: Never    Smokeless tobacco: Never   Substance and Sexual Activity    Alcohol use: Not Currently    Drug use: Never    Sexual activity: Not Currently     Partners: Male     Social Drivers of Health     Financial Resource Strain: Low Risk  (7/10/2024)    Overall Financial Resource Strain (CARDIA)     Difficulty of Paying Living Expenses: Not hard at all   Food Insecurity: No Food Insecurity (7/10/2024)    Hunger Vital Sign     Worried About Running Out of Food in the Last Year: Never true     Ran Out of Food in the Last Year: Never true   Transportation Needs: No Transportation Needs (7/10/2024)    TRANSPORTATION NEEDS     Transportation : No   Physical Activity: Inactive (7/9/2024)    Exercise Vital Sign     Days of Exercise per Week: 0 days     Minutes of Exercise per Session: 0 min   Stress: No Stress Concern Present (7/10/2024)    [employer] - Occupational Stress Questionnaire     Feeling of Stress : Not at all   Housing Stability: Unknown (7/10/2024)    Housing Stability Vital Sign     Unable to Pay for Housing in the Last Year: No     Homeless in the Last Year: No     Review of patient's allergies indicates:  No Known Allergies     Current Outpatient Medications   Medication Instructions    acetaminophen (TYLENOL) 500 mg, Every  "6 hours PRN    aspirin 81 mg, Daily    atorvastatin (LIPITOR) 80 mg, Oral, Daily    carvediloL (COREG) 12.5 mg, Oral, 2 times daily    clobetasol 0.05% (TEMOVATE) 0.05 % Oint Apply to affected external vaginal area 2x/week    cyanocobalamin (vitamin B-12) (VITAMIN B-12) 50 mcg, Daily    gabapentin (NEURONTIN) 300 mg, 3 times daily    HYDROcodone-acetaminophen (NORCO) 5-325 mg per tablet 1 tablet, Oral, Every 12 hours PRN    lancets (LANCETS,THIN) Misc 1 Needle, Misc.(Non-Drug; Combo Route), 2 times daily    LANTUS SOLOSTAR U-100 INSULIN 26 Units, Subcutaneous, Daily    LIDOcaine (LIDODERM) 5 % 1 patch, Transdermal, Daily, Remove & Discard patch within 12 hours or as directed by MD    lisinopriL 10 mg, Oral, Daily    loratadine (CLARITIN) 10 mg, Daily    magnesium oxide (MAG-OX) 100 mg, Daily    multivitamin capsule 1 capsule, Daily    omega-3 fatty acids/fish oil (FISH OIL-OMEGA-3 FATTY ACIDS) 300-1,000 mg capsule 1 capsule, Daily    pen needle, diabetic 29 gauge x 1/2" Ndle   Insulin pen needles, See Instructions, [place]lin pen needles for once a day Lantus injection  E11.65, # 90 EA, 3 Refill(s), Pharmacy: [id number], 162, cm, Height/Length Dosing, 12/10/21 9:34:00 CST, 90.2, kg, Weight Dosing, 12/10/21 9:34:00 CST    vitamin D (VITAMIN D3) 1,000 Units, Daily      Review of Systems  12 point review of systems conducted, negative except as stated in the history of present illness. See HPI for details.    OBJECTIVE:     Visit Vitals  BP (!) 160/88 (BP Location: Left arm, Patient Position: Sitting)   Pulse 64   Resp 16   Ht 5' 4" (1.626 m)   Wt 73.1 kg (161 lb 3.2 oz)   BMI 27.67 kg/m²      General:  Pleasant, Well-nourished, Well-groomed.    Lungs:  Breathing is quiet with non-labored respirations.    Musculoskeletal:   Lumbar ROM:    Straight leg raise is negative bilaterally.  Crossed straight leg raise is negative bilaterally.  Matt's test is negative bilaterally.  Hip rotation is negative " bilaterally.    Neurological:  The patient is awake, alert, and oriented in all 4 spheres.  Muscle strength against resistance:  Strength  Deltoids Triceps Biceps Wrist Extension Wrist Flexion Intrinsics   Upper: R 5/5 5/5 5/5 5/5 5/5 5/5    L 5/5 5/5 5/5 5/5 5/5 5/5     Iliopsoas Quadriceps Knee  Flexion Tibialis  anterior Gastro- cnemius EHL   Lower: R 5/5 5/5 5/5 5/5 5/5 5/5    L 5/5 5/5 5/5 5/5 5/5 5/5   Sensation is intact to primary modalities in bilateral lower extremities.  Stephon sign is positive bilaterally.  Reflexes:   Right Left   Triceps 2+ 3+   Biceps 3+ 3+   Brachioradialis 3+ 3+   Patellar 2+ 2+   Achilles 2+ 2+   Gait is antalgic.  Coordination:  Within normal limits    Imaging:  All pertinent neuroimaging independently reviewed. Discussed these findings in detail with the patient.    CT cervical spine without contrast was completed March 27, 2025.    It demonstrates postoperative changes from prior anterior fusion from C3 through C5.  Multilevel degenerative changes.  Severe canal narrowing at C2-C3 due to posterior disc osteophyte complex, facet hypertrophy and ligamentous hypertrophy.    DIAGNOSES:       ICD-10-CM ICD-9-CM   1. Spinal stenosis, cervical region  M48.02 723.0     ASSESSMENT/PLAN:     74-year-old female With history of hypertension, hyperlipidemia, diabetes, CKD with history of hypertension, hyperlipidemia, diabetes, CKD as well as cervical and lumbar spondylosis.  History of prior C3-5 ACDF. Has proximal stenosis (C2-3) with flattening of cord, no cord signal change.  L spine changes worse at L4-5, L5-S1. No weakness. No bowel/bladder issues,  Discussed her symptoms and imaging findings.  She emphasized again she would like to avoid any surgical itnervention if at all possible.  We will continue with imaging surveillance for her C spine, repeat MRI one year from prior scan.  She knows to contact office if she develops worsening weakness/balance/hand function, bowel/bladder  problems.    Thomas Hernandez MD  Neurosurgery  [facility]     30 minutes were personally spent on this visit including my review of available records, prior imaging, comprehensive physical and neurologic examination and discussion with the patient.     Disclaimer:  This note is prepared using voice recognition software and as such is likely to have errors despite attempts at proofreading.

## 2025-04-07 ENCOUNTER — OFFICE VISIT (OUTPATIENT)
Dept: ENDOCRINOLOGY | Facility: CLINIC | Age: 75
End: 2025-04-07
Payer: MEDICARE

## 2025-04-07 ENCOUNTER — LAB VISIT (OUTPATIENT)
Dept: LAB | Facility: HOSPITAL | Age: 75
End: 2025-04-07
Attending: NURSE PRACTITIONER
Payer: MEDICARE

## 2025-04-07 VITALS
SYSTOLIC BLOOD PRESSURE: 149 MMHG | DIASTOLIC BLOOD PRESSURE: 91 MMHG | RESPIRATION RATE: 18 BRPM | HEIGHT: 64 IN | WEIGHT: 162.25 LBS | BODY MASS INDEX: 27.7 KG/M2 | TEMPERATURE: 97 F | HEART RATE: 64 BPM

## 2025-04-07 DIAGNOSIS — N18.2 TYPE 2 DIABETES MELLITUS WITH STAGE 2 CHRONIC KIDNEY DISEASE, WITH LONG-TERM CURRENT USE OF INSULIN: ICD-10-CM

## 2025-04-07 DIAGNOSIS — Z79.4 TYPE 2 DIABETES MELLITUS WITH STAGE 2 CHRONIC KIDNEY DISEASE, WITH LONG-TERM CURRENT USE OF INSULIN: ICD-10-CM

## 2025-04-07 DIAGNOSIS — E06.3 HASHIMOTO'S THYROIDITIS: Primary | ICD-10-CM

## 2025-04-07 DIAGNOSIS — E11.22 TYPE 2 DIABETES MELLITUS WITH STAGE 2 CHRONIC KIDNEY DISEASE, WITH LONG-TERM CURRENT USE OF INSULIN: ICD-10-CM

## 2025-04-07 DIAGNOSIS — E06.3 HASHIMOTO'S THYROIDITIS: ICD-10-CM

## 2025-04-07 LAB
EST. AVERAGE GLUCOSE BLD GHB EST-MCNC: 208.7 MG/DL
HBA1C MFR BLD: 8.9 %
T4 FREE SERPL-MCNC: 0.81 NG/DL (ref 0.7–1.48)
TSH SERPL-ACNC: 2.74 UIU/ML (ref 0.35–4.94)

## 2025-04-07 PROCEDURE — 1125F AMNT PAIN NOTED PAIN PRSNT: CPT | Mod: CPTII,,, | Performed by: NURSE PRACTITIONER

## 2025-04-07 PROCEDURE — 3051F HG A1C>EQUAL 7.0%<8.0%: CPT | Mod: CPTII,,, | Performed by: NURSE PRACTITIONER

## 2025-04-07 PROCEDURE — 84439 ASSAY OF FREE THYROXINE: CPT

## 2025-04-07 PROCEDURE — 99214 OFFICE O/P EST MOD 30 MIN: CPT | Mod: PBBFAC | Performed by: NURSE PRACTITIONER

## 2025-04-07 PROCEDURE — 99214 OFFICE O/P EST MOD 30 MIN: CPT | Mod: S$PBB,,, | Performed by: NURSE PRACTITIONER

## 2025-04-07 PROCEDURE — 4010F ACE/ARB THERAPY RXD/TAKEN: CPT | Mod: CPTII,,, | Performed by: NURSE PRACTITIONER

## 2025-04-07 PROCEDURE — 3008F BODY MASS INDEX DOCD: CPT | Mod: CPTII,,, | Performed by: NURSE PRACTITIONER

## 2025-04-07 PROCEDURE — 84443 ASSAY THYROID STIM HORMONE: CPT

## 2025-04-07 PROCEDURE — 1160F RVW MEDS BY RX/DR IN RCRD: CPT | Mod: CPTII,,, | Performed by: NURSE PRACTITIONER

## 2025-04-07 PROCEDURE — 36415 COLL VENOUS BLD VENIPUNCTURE: CPT

## 2025-04-07 PROCEDURE — 1159F MED LIST DOCD IN RCRD: CPT | Mod: CPTII,,, | Performed by: NURSE PRACTITIONER

## 2025-04-07 PROCEDURE — 3066F NEPHROPATHY DOC TX: CPT | Mod: CPTII,,, | Performed by: NURSE PRACTITIONER

## 2025-04-07 PROCEDURE — 83036 HEMOGLOBIN GLYCOSYLATED A1C: CPT

## 2025-04-07 PROCEDURE — 3080F DIAST BP >= 90 MM HG: CPT | Mod: CPTII,,, | Performed by: NURSE PRACTITIONER

## 2025-04-07 PROCEDURE — 3077F SYST BP >= 140 MM HG: CPT | Mod: CPTII,,, | Performed by: NURSE PRACTITIONER

## 2025-04-07 NOTE — PROGRESS NOTES
Subjective     Patient ID: Marnie Briggs is a 74 y.o. female.    Chief Complaint: Hypothyroidism    Endocrine clinic note:  73 year female scheduled today previously established in Endocrine Clinic re-referred.  History of Hashimoto's/hypothyroidism previously seen for type 2 diabetes.  Hashimoto's/hypothyroidism patient is currently not on any medications. TSH 4.770 on 01/26/2024 no free T4 drawn at this time.  Previous provider notes states TPO was elevated TPO positive TPO quantitative 1130.  Patient has not been on any medication.  She denies any weight gain she states she has actually lost close to 20 lb but she has been trying and using portion control.  She denies excessive fatigue.  Patient was previously seen in Endocrine Clinic for type 2 diabetes with A1c of 6.7 and discharge back to PCP due to diabetes being controlled.  Patient's recent A1c increased will repeat today if remains elevated we will resume diabetes care.     Clinic note 04/07/2025:  74-year-old female scheduled today for endocrine clinic follow-up.  History/of Hashimoto's/hypothyroidism and type 2 diabetes.  Hashimoto's patient is currently not on any medications and TSH improved to 1.507 on 02/06/2025 from previous 3.603 on 08/22/2024 patient has had fluctuating TSH but denies any weight gain or fatigue and wants to wait on medication does time.  Type 2 diabetes recent A1c increased to 7.5 on 01/08/2025.  At that time patient received steroids and was eating holiday foods.  We will repeat A1c today.       Review of Systems   Constitutional:  Negative for activity change, appetite change and fatigue.   HENT:  Negative for dental problem, hearing loss, tinnitus, trouble swallowing and goiter.    Eyes:  Negative for photophobia, pain and visual disturbance.   Respiratory:  Negative for cough, chest tightness and wheezing.    Cardiovascular:  Negative for chest pain, palpitations and leg swelling.   Gastrointestinal:  Negative for abdominal  pain, constipation, diarrhea, nausea and reflux.   Endocrine: Negative for cold intolerance, heat intolerance, polydipsia and polyphagia.   Genitourinary:  Negative for difficulty urinating, flank pain, hematuria, hot flashes, menstrual irregularity, menstrual problem, nocturia and urgency.   Musculoskeletal:  Positive for back pain, gait problem and neck pain. Negative for joint swelling, leg pain and joint deformity.   Integumentary:  Negative for color change, pallor, rash and breast discharge.   Allergic/Immunologic: Negative for environmental allergies, food allergies and immunocompromised state.   Neurological:  Negative for tremors, seizures, headaches and coordination difficulties.   Psychiatric/Behavioral:  Negative for agitation, behavioral problems and sleep disturbance. The patient is not nervous/anxious.           Objective     Physical Exam  Constitutional:       General: She is not in acute distress.     Appearance: Normal appearance. She is not ill-appearing.   HENT:      Head: Normocephalic and atraumatic.      Right Ear: External ear normal.      Left Ear: External ear normal.      Nose: Nose normal. No congestion or rhinorrhea.      Mouth/Throat:      Mouth: Mucous membranes are moist.      Pharynx: Oropharynx is clear. No oropharyngeal exudate.   Eyes:      General:         Right eye: No discharge.         Left eye: No discharge.      Conjunctiva/sclera: Conjunctivae normal.      Pupils: Pupils are equal, round, and reactive to light.   Neck:      Thyroid: No thyroid mass, thyromegaly or thyroid tenderness.   Cardiovascular:      Rate and Rhythm: Normal rate and regular rhythm.      Pulses: Normal pulses.      Heart sounds: Normal heart sounds. No murmur heard.  Pulmonary:      Effort: Pulmonary effort is normal. No respiratory distress.      Breath sounds: Normal breath sounds.   Abdominal:      General: Abdomen is flat. Bowel sounds are normal. There is no distension.      Palpations: Abdomen  is soft.      Tenderness: There is no abdominal tenderness.   Musculoskeletal:         General: No swelling or tenderness. Normal range of motion.      Cervical back: Normal range of motion and neck supple. No tenderness.      Right lower leg: No edema.      Left lower leg: No edema.   Feet:      Right foot:      Skin integrity: Skin integrity normal.      Left foot:      Skin integrity: Skin integrity normal.   Lymphadenopathy:      Cervical: No cervical adenopathy.   Skin:     General: Skin is warm and dry.      Coloration: Skin is not jaundiced or pale.   Neurological:      General: No focal deficit present.      Mental Status: She is alert and oriented to person, place, and time. Mental status is at baseline.      Coordination: Coordination normal.      Gait: Gait normal.   Psychiatric:         Mood and Affect: Mood normal.         Behavior: Behavior normal.         Thought Content: Thought content normal.            Assessment and Plan     1. Hashimoto's thyroiditis  TSH 1.507 on 02/06/2025   Labs today monitor thyroid levels  No medication at this time            Component  Ref Range & Units (hover) 2 mo ago  (2/6/25) 7 mo ago  (8/22/24) 1 yr ago  (1/26/24) 1 yr ago  (8/7/23) 1 yr ago  (6/23/23) 2 yr ago  (5/19/22) 3 yr ago  (11/22/21)   TSH 1.507 3.603 4.770 2.251 3.363 2.6152 R 4.7929 R   Resulting Agency UL LAB UL LAB UL LAB [facility] LAB [facility]      -     T4, Free; Future; Expected date: 04/07/2025  -     TSH; Future; Expected date: 04/07/2025    2. Type 2 diabetes mellitus with stage 2 chronic kidney disease, with long-term current use of insulin  Recent A1C 7.5   PCP manages   Component  Ref Range & Units (hover) 2 mo ago  (1/8/25) 7 mo ago  (8/22/24) 11 mo ago  (4/17/24) 1 yr ago  (1/26/24) 1 yr ago  (8/7/23) 1 yr ago  (6/23/23) 2 yr ago  (2/6/23)   Hemoglobin A1c 7.5 High  8.0 High  7.1 High  8.3 High  7.0 7.5 High  8.2 High    Estimated Average Glucose 168.6 182.9 157.1 191.5 154.2  168.6 1   -     Hemoglobin A1C; Future; Expected date: 04/07/2025    Follow up in about 6 months (around 10/7/2025) for Hashimotos, Type 2 diabetes.

## 2025-04-08 ENCOUNTER — RESULTS FOLLOW-UP (OUTPATIENT)
Dept: ENDOCRINOLOGY | Facility: CLINIC | Age: 75
End: 2025-04-08

## 2025-04-09 ENCOUNTER — TELEPHONE (OUTPATIENT)
Dept: NEUROSURGERY | Facility: CLINIC | Age: 75
End: 2025-04-09
Payer: MEDICARE

## 2025-04-09 NOTE — TELEPHONE ENCOUNTER
----- Message from Alice sent at 2025  1:21 PM CDT -----  25 11:36a TAKENTo......:  Office Numbers [id number]....: Marnie BriggsPhone #.: [phone].: She IsPt .: 2Primary.: Dr. Thomas Hernandez [id number] Weeks.: NoDetails.: Advised that she had a MRI on 25. Pt advised if you need a second MRI she [facility] [place] [facility] for scheduled [facility]t on   Call Type Details: Rx Refill/ Rx not called in -

## 2025-04-09 NOTE — TELEPHONE ENCOUNTER
Spoke with pt. Her MRI was scheduled too soon. I r/s to 1/2/26 @ 815 am at [facility]. Her follow up w/ Dr. Hernandez is 1/5/26 @ 9 am. We will send in Valium just prior to MRI appt ([facility] at [facility] in [place].

## 2025-04-23 DIAGNOSIS — G89.29 CHRONIC RIGHT-SIDED LOW BACK PAIN WITH RIGHT-SIDED SCIATICA: Chronic | ICD-10-CM

## 2025-04-23 DIAGNOSIS — M54.41 CHRONIC RIGHT-SIDED LOW BACK PAIN WITH RIGHT-SIDED SCIATICA: Chronic | ICD-10-CM

## 2025-04-23 DIAGNOSIS — M51.369 LUMBAR DEGENERATIVE DISC DISEASE: ICD-10-CM

## 2025-04-23 RX ORDER — HYDROCODONE BITARTRATE AND ACETAMINOPHEN 5; 325 MG/1; MG/1
1 TABLET ORAL EVERY 12 HOURS PRN
Qty: 60 TABLET | Refills: 0 | Status: SHIPPED | OUTPATIENT
Start: 2025-04-24 | End: 2025-04-24 | Stop reason: SDUPTHER

## 2025-04-24 DIAGNOSIS — G89.29 CHRONIC RIGHT-SIDED LOW BACK PAIN WITH RIGHT-SIDED SCIATICA: Chronic | ICD-10-CM

## 2025-04-24 DIAGNOSIS — M51.369 LUMBAR DEGENERATIVE DISC DISEASE: ICD-10-CM

## 2025-04-24 DIAGNOSIS — M54.41 CHRONIC RIGHT-SIDED LOW BACK PAIN WITH RIGHT-SIDED SCIATICA: Chronic | ICD-10-CM

## 2025-04-24 RX ORDER — HYDROCODONE BITARTRATE AND ACETAMINOPHEN 5; 325 MG/1; MG/1
1 TABLET ORAL EVERY 12 HOURS PRN
Qty: 60 TABLET | Refills: 0 | Status: SHIPPED | OUTPATIENT
Start: 2025-04-24

## 2025-05-02 ENCOUNTER — OFFICE VISIT (OUTPATIENT)
Facility: CLINIC | Age: 75
End: 2025-05-02
Payer: MEDICARE

## 2025-05-02 VITALS
WEIGHT: 160 LBS | HEIGHT: 64 IN | BODY MASS INDEX: 27.31 KG/M2 | DIASTOLIC BLOOD PRESSURE: 81 MMHG | HEART RATE: 72 BPM | SYSTOLIC BLOOD PRESSURE: 119 MMHG

## 2025-05-02 DIAGNOSIS — G89.29 CHRONIC NECK PAIN: ICD-10-CM

## 2025-05-02 DIAGNOSIS — M54.2 CHRONIC NECK PAIN: ICD-10-CM

## 2025-05-02 DIAGNOSIS — M51.362 DEGENERATION OF INTERVERTEBRAL DISC OF LUMBAR REGION WITH DISCOGENIC BACK PAIN AND LOWER EXTREMITY PAIN: ICD-10-CM

## 2025-05-02 DIAGNOSIS — G89.29 CHRONIC RIGHT-SIDED LOW BACK PAIN WITH RIGHT-SIDED SCIATICA: Chronic | ICD-10-CM

## 2025-05-02 DIAGNOSIS — M48.02 CERVICAL STENOSIS OF SPINAL CANAL: Primary | ICD-10-CM

## 2025-05-02 DIAGNOSIS — M54.41 CHRONIC RIGHT-SIDED LOW BACK PAIN WITH RIGHT-SIDED SCIATICA: Chronic | ICD-10-CM

## 2025-05-02 RX ORDER — GABAPENTIN 300 MG/1
300 CAPSULE ORAL 3 TIMES DAILY
Status: CANCELLED | OUTPATIENT
Start: 2025-05-02

## 2025-05-02 RX ORDER — GABAPENTIN 400 MG/1
CAPSULE ORAL
Qty: 120 CAPSULE | Refills: 3 | Status: SHIPPED | OUTPATIENT
Start: 2025-05-02

## 2025-05-02 NOTE — PROGRESS NOTES
Marycruz Atwood MD        PATIENT NAME: Marnie Briggs  : 1950  DATE: 25  MRN: [id number]      Billing Provider: Marycruz Atwood MD  Level of Service: ID OFFICE/OUTPT VISIT, EST, LEVL IV, 30-39 MIN  Patient PCP Information       Provider PCP Bisi Blackman MD General            Reason for Visit / Chief Complaint: Neck Pain (Per leigha pt is to f/u w/Dr Atwood to re eval cervical pain C/O pain level 8, Taking pain meds states helping a little)       Update PCP  Update Chief Complaint         History of Present Illness / Problem Focused Workflow     Marnie Briggs presents to the clinic with Neck Pain (Per leigha pt is to f/u w/Dr Atwood to re eval cervical pain C/O pain level 8, Taking pain meds states helping a little)     This is a 74-year-old female who presents to clinic today for follow up of low back pain that has been present since she fell in the .  She has not undergone any lumbar spine surgery, although she did have cervical spine surgery several years ago.  She currently rates her pain as 8/10 on the NRS.  It gets down to 5/10 at best.  She finished a course of physical therapy last year, which helped somewhat while she was going to it.  She is trying to continue doing the exercises and stretches at home.   Pain radiates down the bilateral lower extremities into the posterior thighs.  She also reports numbness and tingling in her feet.      Back Pain  Associated symptoms include leg pain and numbness.   Neck Pain   Associated symptoms include leg pain and numbness.       Review of Systems     Review of Systems   Musculoskeletal:  Positive for back pain, leg pain and neck pain.   Neurological:  Positive for numbness.   Psychiatric/Behavioral:  Positive for sleep disturbance.    All other systems reviewed and are negative.       Medical / Social / Family History     Past Medical History:   Diagnosis Date    Cervical radiculopathy     CKD (chronic kidney disease) stage 2, GFR  60-89 ml/min     DM (diabetes mellitus)     HLD (hyperlipidemia)     HTN (hypertension)        Past Surgical History:   Procedure Laterality Date    ANKLE FUSION Right     CATARACT EXTRACTION Bilateral     CERVICAL FUSION  2014    C3-4, C4-5 ACDF . Dr. Trujillo    CHOLECYSTECTOMY      COLONOSCOPY  11/30/2015    DILATION AND CURETTAGE OF UTERUS      EPIDURAL STEROID INJECTION INTO CERVICAL SPINE N/A 3/12/2025    Procedure: INJECTION, STEROID, SPINE, CERVICAL, EPIDURAL / SUKHWINDER C7-T1;  Surgeon: Marycruz Atwood MD;  Location: [facility] OR;  Service: Pain Management;  Laterality: N/A;  SUKHWINDER C7-T1    HYSTERECTOMY      procedure for ureter stenosis      1980's.  Dr. Elkins    STENT, RENAL Right     TRANSFORAMINAL EPIDURAL INJECTION OF STEROID Bilateral 08/19/2024    Procedure: INJECTION, STEROID, EPIDURAL, TRANSFORAMINAL APPROACH  ///Rt L4 & Lt L5;  Surgeon: Marycruz Atwood MD;  Location: [facility] OR;  Service: Pain Management;  Laterality: Bilateral;  TFESI Rt L4 & Lt L5    TUBAL LIGATION         Social History  Ms. Briggs  reports that she has never smoked. She has never been exposed to tobacco smoke. She has never used smokeless tobacco. She reports that she does not currently use alcohol. She reports that she does not use drugs.    Family History  Ms.'s Briggs family history includes Cancer in her brother; Diabetes in her brother, father, and mother; Heart disease in her father; Hypertension in her father; Stroke in her mother.    Medications and Allergies     Medications  Outpatient Medications Marked as Taking for the 5/2/25 encounter (Office Visit) with Marycruz Atwood MD   Medication Sig Dispense Refill    acetaminophen (TYLENOL) 500 MG tablet Take 500 mg by mouth every 6 (six) hours as needed for Pain.      aspirin 81 MG Chew Take 81 mg by mouth once daily at 6am.      atorvastatin (LIPITOR) 80 MG tablet Take 1 tablet (80 mg total) by mouth once daily. 90 tablet 3    carvediloL (COREG) 12.5 MG tablet Take 1 tablet  "(12.5 mg total) by mouth 2 (two) times daily. 180 tablet 3    clobetasol 0.05% (TEMOVATE) 0.05 % Oint Apply to affected external vaginal area 2x/week 60 g 6    cyanocobalamin, vitamin B-12, (VITAMIN B-12) 50 mcg tablet Take 50 mcg by mouth once daily.      HYDROcodone-acetaminophen (NORCO) 5-325 mg per tablet Take 1 tablet by mouth every 12 (twelve) hours as needed for Pain. 60 tablet 0    lancets (LANCETS,THIN) Misc 1 Needle by Misc.(Non-Drug; Combo Route) route 2 (two) times a day. 100 each 5    LANTUS SOLOSTAR U-100 INSULIN 100 unit/mL (3 mL) InPn pen Inject 26 Units into the skin once daily. 9 mL 2    LIDOcaine (LIDODERM) 5 % Place 1 patch onto the skin once daily. Remove & Discard patch within 12 hours or as directed by MD 30 patch 5    lisinopriL 10 MG tablet Take 1 tablet (10 mg total) by mouth once daily. 90 tablet 3    loratadine (CLARITIN) 10 mg tablet Take 10 mg by mouth once daily.      magnesium oxide (MAG-OX) 400 mg (241.3 mg magnesium) tablet Take 100 mg by mouth once daily.      multivitamin capsule Take 1 capsule by mouth once daily.      omega-3 fatty acids/fish oil (FISH OIL-OMEGA-3 FATTY ACIDS) 300-1,000 mg capsule Take 1 capsule by mouth once daily.      pen needle, diabetic 29 gauge x 1/2" Ndle   Insulin pen needles, See Instructions, Inuslin pen needles for once a day Lantus injection  E11.65, # 90 EA, 3 Refill(s), Pharmacy: [place] [id number], 162, cm, Height/Length Dosing, 12/10/21 9:34:00 CST, 90.2, kg, Weight Dosing, 12/10/21 9:34:00 CST      vitamin D (VITAMIN D3) 1000 units Tab Take 1,000 Units by mouth once daily.      [DISCONTINUED] gabapentin (NEURONTIN) 300 MG capsule Take 300 mg by mouth 3 (three) times daily.         Allergies  Review of patient's allergies indicates:  No Known Allergies    Physical Examination     Vitals:    05/02/25 0810   BP: 119/81   Pulse: 72     Pain Disability Index (PDI): 48       Spine Musculoskeletal Exam    Gait    Gait is normal.    Inspection    " Thoracolumbar    Thoracolumbar inspection is normal.    Palpation    Thoracolumbar    Tenderness: none    Right      Masses: none      Spasms: none      Crepitus: none      Muscle tone: normal    Left      Masses: none      Spasms: none      Crepitus: none      Muscle tone: normal    Range of Motion    Thoracolumbar        Thoracolumbar range of motion additional comments: Limited range of motion in lumbar spine due to pain.    Strength    Thoracolumbar    Thoracolumbar motor exam is normal.       Sensory    Thoracolumbar    Thoracolumbar sensation is normal.    General      Constitutional: appears stated age, well-developed and well-nourished    Scleral icterus: no    Labored breathing: no    Psychiatric: normal mood and affect and no acute distress    Neurological: alert and oriented x3    Skin: intact    Lymphadenopathy: none  CV: extremities warm, well-perfused  Resp: nonlabored breathing    Assessment and Plan (including Health Maintenance)      Problem List  Smart Sets  Document Outside HM   :    Plan:   Cervical stenosis of spinal canal  -     gabapentin (NEURONTIN) 400 MG capsule; Take 1 by mouth Q AM, 1 Q noon, 2 QHS  Dispense: 120 capsule; Refill: 3    Degeneration of intervertebral disc of lumbar region with discogenic back pain and lower extremity pain  -     gabapentin (NEURONTIN) 400 MG capsule; Take 1 by mouth Q AM, 1 Q noon, 2 QHS  Dispense: 120 capsule; Refill: 3    Chronic right-sided low back pain with right-sided sciatica  -     gabapentin (NEURONTIN) 400 MG capsule; Take 1 by mouth Q AM, 1 Q noon, 2 QHS  Dispense: 120 capsule; Refill: 3    Chronic neck pain  -     gabapentin (NEURONTIN) 400 MG capsule; Take 1 by mouth Q AM, 1 Q noon, 2 QHS  Dispense: 120 capsule; Refill: 3       I am increasing the dose of her gabapentin today for the neuropathic component of her pain and will titrate up the dose as tolerated.  She will follow up again in 3 months, at which point we may want to consider another  injection.  She voices understanding and is in agreement with the plan.    Problem List Items Addressed This Visit          Neuro    Lumbar degenerative disc disease    Relevant Medications    gabapentin (NEURONTIN) 400 MG capsule    Cervical stenosis of spinal canal - Primary    Relevant Medications    gabapentin (NEURONTIN) 400 MG capsule       Orthopedic    Chronic right-sided low back pain with right-sided sciatica (Chronic)    Relevant Medications    gabapentin (NEURONTIN) 400 MG capsule    Chronic neck pain    Relevant Medications    gabapentin (NEURONTIN) 400 MG capsule         Future Appointments   Date Time Provider Department Center   5/28/2025  9:00 AM Kenzie Blackman MD [facility] FM RES [place]   8/8/2025  8:45 AM Marycruz Atwood MD [facility] PAINMD [place]   8/18/2025  8:00 AM Tony Bhatti, FNP [facility] CARD [place]   9/11/2025  8:20 AM PROVIDERS, [facility] OPHTH [facility] OPH[facility] [place]   9/12/2025  8:00 AM Susan Varma MD [facility] NEPHR [place]   10/1/2025  7:30 AM Margaret Briones, ANP [facility] GYN [place]   10/7/2025  7:30 AM Pavithra Hernandez, NP [facility] ENDOCR [place]   1/2/2026  8:15 AM [facility] MRI1 420 LB LIMIT [facility] MRI [place]   1/5/2026  9:00 AM Thomas Lara MD [facility] NEUSGY [place]        There are no Patient Instructions on file for this visit.  No follow-ups on file.     Signature:  Marycruz Atwood MD      Date of encounter: 5/2/25

## 2025-05-23 DIAGNOSIS — M54.41 CHRONIC RIGHT-SIDED LOW BACK PAIN WITH RIGHT-SIDED SCIATICA: Chronic | ICD-10-CM

## 2025-05-23 DIAGNOSIS — G89.29 CHRONIC RIGHT-SIDED LOW BACK PAIN WITH RIGHT-SIDED SCIATICA: Chronic | ICD-10-CM

## 2025-05-23 DIAGNOSIS — M51.369 LUMBAR DEGENERATIVE DISC DISEASE: ICD-10-CM

## 2025-05-23 RX ORDER — HYDROCODONE BITARTRATE AND ACETAMINOPHEN 5; 325 MG/1; MG/1
1 TABLET ORAL EVERY 12 HOURS PRN
Qty: 60 TABLET | Refills: 0 | Status: SHIPPED | OUTPATIENT
Start: 2025-05-23

## 2025-05-27 DIAGNOSIS — I10 PRIMARY HYPERTENSION: Chronic | ICD-10-CM

## 2025-05-27 RX ORDER — ATORVASTATIN CALCIUM 80 MG/1
80 TABLET, FILM COATED ORAL DAILY
Qty: 90 TABLET | Refills: 3 | Status: SHIPPED | OUTPATIENT
Start: 2025-05-27 | End: 2026-05-27

## 2025-05-27 RX ORDER — CARVEDILOL 12.5 MG/1
12.5 TABLET ORAL 2 TIMES DAILY
Qty: 180 TABLET | Refills: 3 | Status: SHIPPED | OUTPATIENT
Start: 2025-05-27 | End: 2026-05-27

## 2025-05-27 RX ORDER — LISINOPRIL 10 MG/1
10 TABLET ORAL DAILY
Qty: 90 TABLET | Refills: 3 | Status: SHIPPED | OUTPATIENT
Start: 2025-05-27 | End: 2026-05-27

## 2025-05-28 ENCOUNTER — OFFICE VISIT (OUTPATIENT)
Dept: FAMILY MEDICINE | Facility: CLINIC | Age: 75
End: 2025-05-28
Payer: MEDICARE

## 2025-05-28 VITALS
WEIGHT: 165 LBS | SYSTOLIC BLOOD PRESSURE: 140 MMHG | HEART RATE: 67 BPM | HEIGHT: 64 IN | BODY MASS INDEX: 28.17 KG/M2 | OXYGEN SATURATION: 98 % | DIASTOLIC BLOOD PRESSURE: 85 MMHG | TEMPERATURE: 98 F

## 2025-05-28 DIAGNOSIS — M51.369 DEGENERATION OF INTERVERTEBRAL DISC OF LUMBAR REGION, UNSPECIFIED WHETHER PAIN PRESENT: Primary | ICD-10-CM

## 2025-05-28 DIAGNOSIS — I10 HYPERTENSION, UNSPECIFIED TYPE: ICD-10-CM

## 2025-05-28 PROCEDURE — 99215 OFFICE O/P EST HI 40 MIN: CPT | Mod: PBBFAC | Performed by: FAMILY MEDICINE
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
## 2025-06-20 ENCOUNTER — TELEPHONE (OUTPATIENT)
Dept: FAMILY MEDICINE | Facility: CLINIC | Age: 75
End: 2025-06-20
Payer: MEDICARE

## 2025-06-20 DIAGNOSIS — M54.41 CHRONIC RIGHT-SIDED LOW BACK PAIN WITH RIGHT-SIDED SCIATICA: Chronic | ICD-10-CM

## 2025-06-20 DIAGNOSIS — M51.369 LUMBAR DEGENERATIVE DISC DISEASE: ICD-10-CM

## 2025-06-20 DIAGNOSIS — G89.29 CHRONIC RIGHT-SIDED LOW BACK PAIN WITH RIGHT-SIDED SCIATICA: Chronic | ICD-10-CM

## 2025-06-20 RX ORDER — HYDROCODONE BITARTRATE AND ACETAMINOPHEN 5; 325 MG/1; MG/1
1 TABLET ORAL EVERY 12 HOURS PRN
Qty: 60 TABLET | Refills: 0 | Status: SHIPPED | OUTPATIENT
Start: 2025-06-20

## 2025-07-22 DIAGNOSIS — M54.41 CHRONIC RIGHT-SIDED LOW BACK PAIN WITH RIGHT-SIDED SCIATICA: Chronic | ICD-10-CM

## 2025-07-22 DIAGNOSIS — G89.29 CHRONIC RIGHT-SIDED LOW BACK PAIN WITH RIGHT-SIDED SCIATICA: Chronic | ICD-10-CM

## 2025-07-22 DIAGNOSIS — M51.369 LUMBAR DEGENERATIVE DISC DISEASE: ICD-10-CM

## 2025-07-22 RX ORDER — HYDROCODONE BITARTRATE AND ACETAMINOPHEN 5; 325 MG/1; MG/1
1 TABLET ORAL EVERY 12 HOURS PRN
Qty: 60 TABLET | Refills: 0 | Status: SHIPPED | OUTPATIENT
Start: 2025-07-22

## 2025-08-12 ENCOUNTER — OFFICE VISIT (OUTPATIENT)
Facility: CLINIC | Age: 75
End: 2025-08-12
Payer: MEDICARE

## 2025-08-12 VITALS
BODY MASS INDEX: 27.31 KG/M2 | WEIGHT: 160 LBS | HEART RATE: 66 BPM | DIASTOLIC BLOOD PRESSURE: 80 MMHG | HEIGHT: 64 IN | SYSTOLIC BLOOD PRESSURE: 139 MMHG

## 2025-08-12 DIAGNOSIS — M54.9 CHRONIC BACK PAIN GREATER THAN 3 MONTHS DURATION: ICD-10-CM

## 2025-08-12 DIAGNOSIS — M54.2 CERVICALGIA: Primary | ICD-10-CM

## 2025-08-12 DIAGNOSIS — M50.20 CERVICAL DISC DISPLACEMENT: ICD-10-CM

## 2025-08-12 DIAGNOSIS — M54.16 LUMBAR RADICULOPATHY: ICD-10-CM

## 2025-08-12 DIAGNOSIS — M54.14 THORACIC RADICULOPATHY: ICD-10-CM

## 2025-08-12 DIAGNOSIS — G89.29 CHRONIC BACK PAIN GREATER THAN 3 MONTHS DURATION: ICD-10-CM

## 2025-08-12 PROCEDURE — 1160F RVW MEDS BY RX/DR IN RCRD: CPT | Mod: CPTII,,, | Performed by: NURSE PRACTITIONER

## 2025-08-12 PROCEDURE — 3288F FALL RISK ASSESSMENT DOCD: CPT | Mod: CPTII,,, | Performed by: NURSE PRACTITIONER

## 2025-08-12 PROCEDURE — 1101F PT FALLS ASSESS-DOCD LE1/YR: CPT | Mod: CPTII,,, | Performed by: NURSE PRACTITIONER

## 2025-08-12 PROCEDURE — 4010F ACE/ARB THERAPY RXD/TAKEN: CPT | Mod: CPTII,,, | Performed by: NURSE PRACTITIONER

## 2025-08-12 PROCEDURE — 3079F DIAST BP 80-89 MM HG: CPT | Mod: CPTII,,, | Performed by: NURSE PRACTITIONER

## 2025-08-12 PROCEDURE — 3075F SYST BP GE 130 - 139MM HG: CPT | Mod: CPTII,,, | Performed by: NURSE PRACTITIONER

## 2025-08-12 PROCEDURE — 3052F HG A1C>EQUAL 8.0%<EQUAL 9.0%: CPT | Mod: CPTII,,, | Performed by: NURSE PRACTITIONER

## 2025-08-12 PROCEDURE — 1125F AMNT PAIN NOTED PAIN PRSNT: CPT | Mod: CPTII,,, | Performed by: NURSE PRACTITIONER

## 2025-08-12 PROCEDURE — 3066F NEPHROPATHY DOC TX: CPT | Mod: CPTII,,, | Performed by: NURSE PRACTITIONER

## 2025-08-12 PROCEDURE — 1159F MED LIST DOCD IN RCRD: CPT | Mod: CPTII,,, | Performed by: NURSE PRACTITIONER

## 2025-08-12 PROCEDURE — 3008F BODY MASS INDEX DOCD: CPT | Mod: CPTII,,, | Performed by: NURSE PRACTITIONER

## 2025-08-12 PROCEDURE — 99214 OFFICE O/P EST MOD 30 MIN: CPT | Mod: ,,, | Performed by: NURSE PRACTITIONER

## 2025-08-12 RX ORDER — LEVOCETIRIZINE DIHYDROCHLORIDE 5 MG/1
5 TABLET, FILM COATED ORAL
COMMUNITY
Start: 2025-08-05

## 2025-08-12 RX ORDER — METHYLPREDNISOLONE 4 MG/1
TABLET ORAL
Qty: 21 EACH | Refills: 0 | Status: SHIPPED | OUTPATIENT
Start: 2025-08-12 | End: 2025-09-02

## 2025-08-12 RX ORDER — BENZONATATE 100 MG/1
100 CAPSULE ORAL
COMMUNITY
Start: 2025-08-05

## 2025-08-21 RX ORDER — INSULIN GLARGINE 100 [IU]/ML
INJECTION, SOLUTION SUBCUTANEOUS
Qty: 9 ML | Refills: 2 | Status: SHIPPED | OUTPATIENT
Start: 2025-08-21

## 2025-08-22 DIAGNOSIS — M54.41 CHRONIC RIGHT-SIDED LOW BACK PAIN WITH RIGHT-SIDED SCIATICA: Chronic | ICD-10-CM

## 2025-08-22 DIAGNOSIS — G89.29 CHRONIC RIGHT-SIDED LOW BACK PAIN WITH RIGHT-SIDED SCIATICA: Chronic | ICD-10-CM

## 2025-08-22 DIAGNOSIS — E11.22 TYPE 2 DIABETES MELLITUS WITH STAGE 2 CHRONIC KIDNEY DISEASE, WITH LONG-TERM CURRENT USE OF INSULIN: Primary | Chronic | ICD-10-CM

## 2025-08-22 DIAGNOSIS — N18.2 TYPE 2 DIABETES MELLITUS WITH STAGE 2 CHRONIC KIDNEY DISEASE, WITH LONG-TERM CURRENT USE OF INSULIN: Primary | Chronic | ICD-10-CM

## 2025-08-22 DIAGNOSIS — M51.369 LUMBAR DEGENERATIVE DISC DISEASE: ICD-10-CM

## 2025-08-22 DIAGNOSIS — Z79.4 TYPE 2 DIABETES MELLITUS WITH STAGE 2 CHRONIC KIDNEY DISEASE, WITH LONG-TERM CURRENT USE OF INSULIN: Primary | Chronic | ICD-10-CM

## 2025-08-22 RX ORDER — HYDROCODONE BITARTRATE AND ACETAMINOPHEN 5; 325 MG/1; MG/1
1 TABLET ORAL EVERY 12 HOURS PRN
Qty: 60 TABLET | Refills: 0 | Status: SHIPPED | OUTPATIENT
Start: 2025-08-22

## 2025-08-22 RX ORDER — PEN NEEDLE, DIABETIC 29 G X1/2"
1 NEEDLE, DISPOSABLE MISCELLANEOUS DAILY
Qty: 90 EACH | Refills: 3 | Status: SHIPPED | OUTPATIENT
Start: 2025-08-22

## 2025-08-25 DIAGNOSIS — G89.29 CHRONIC RIGHT-SIDED LOW BACK PAIN WITH RIGHT-SIDED SCIATICA: Chronic | ICD-10-CM

## 2025-08-25 DIAGNOSIS — M54.41 CHRONIC RIGHT-SIDED LOW BACK PAIN WITH RIGHT-SIDED SCIATICA: Chronic | ICD-10-CM

## 2025-08-25 DIAGNOSIS — M51.369 LUMBAR DEGENERATIVE DISC DISEASE: ICD-10-CM

## 2025-08-25 RX ORDER — HYDROCODONE BITARTRATE AND ACETAMINOPHEN 5; 325 MG/1; MG/1
1 TABLET ORAL EVERY 12 HOURS PRN
Qty: 60 TABLET | Refills: 0 | Status: SHIPPED | OUTPATIENT
Start: 2025-08-25

## 2025-09-03 ENCOUNTER — OFFICE VISIT (OUTPATIENT)
Dept: FAMILY MEDICINE | Facility: CLINIC | Age: 75
End: 2025-09-03
Payer: MEDICARE

## 2025-09-03 VITALS
HEIGHT: 64 IN | SYSTOLIC BLOOD PRESSURE: 124 MMHG | DIASTOLIC BLOOD PRESSURE: 84 MMHG | WEIGHT: 158 LBS | HEART RATE: 72 BPM | OXYGEN SATURATION: 98 % | BODY MASS INDEX: 26.98 KG/M2 | TEMPERATURE: 99 F

## 2025-09-03 DIAGNOSIS — E55.9 VITAMIN D DEFICIENCY: ICD-10-CM

## 2025-09-03 DIAGNOSIS — E08.00 DIABETES MELLITUS DUE TO UNDERLYING CONDITION WITH HYPEROSMOLARITY WITHOUT COMA, WITH LONG-TERM CURRENT USE OF INSULIN: Primary | ICD-10-CM

## 2025-09-03 DIAGNOSIS — E78.5 HYPERLIPIDEMIA, UNSPECIFIED HYPERLIPIDEMIA TYPE: ICD-10-CM

## 2025-09-03 DIAGNOSIS — Z12.11 SCREENING FOR COLON CANCER: ICD-10-CM

## 2025-09-03 DIAGNOSIS — Z79.4 DIABETES MELLITUS DUE TO UNDERLYING CONDITION WITH HYPEROSMOLARITY WITHOUT COMA, WITH LONG-TERM CURRENT USE OF INSULIN: Primary | ICD-10-CM

## 2025-09-03 DIAGNOSIS — I10 HYPERTENSION, UNSPECIFIED TYPE: ICD-10-CM

## 2025-09-03 LAB
ANION GAP SERPL CALC-SCNC: 8 MEQ/L
BUN SERPL-MCNC: 15.5 MG/DL (ref 9.8–20.1)
CALCIUM SERPL-MCNC: 9.4 MG/DL (ref 8.4–10.2)
CHLORIDE SERPL-SCNC: 103 MMOL/L (ref 98–107)
CO2 SERPL-SCNC: 29 MMOL/L (ref 23–31)
CREAT SERPL-MCNC: 1.13 MG/DL (ref 0.55–1.02)
CREAT/UREA NIT SERPL: 14
EST. AVERAGE GLUCOSE BLD GHB EST-MCNC: ABNORMAL MG/DL
GFR SERPLBLD CREATININE-BSD FMLA CKD-EPI: 52 ML/MIN/1.73/M2
GLUCOSE SERPL-MCNC: 298 MG/DL (ref 82–115)
HBA1C MFR BLD: >14 %
POTASSIUM SERPL-SCNC: 3.9 MMOL/L (ref 3.5–5.1)
SODIUM SERPL-SCNC: 140 MMOL/L (ref 136–145)

## 2025-09-03 PROCEDURE — 80048 BASIC METABOLIC PNL TOTAL CA: CPT | Performed by: FAMILY MEDICINE

## 2025-09-03 PROCEDURE — 99215 OFFICE O/P EST HI 40 MIN: CPT | Mod: PBBFAC | Performed by: FAMILY MEDICINE

## 2025-09-03 PROCEDURE — 83036 HEMOGLOBIN GLYCOSYLATED A1C: CPT | Performed by: FAMILY MEDICINE

## 2025-09-03 RX ORDER — INSULIN GLARGINE 100 [IU]/ML
35 INJECTION, SOLUTION SUBCUTANEOUS NIGHTLY
Qty: 9 ML | Refills: 2 | Status: SHIPPED | OUTPATIENT
Start: 2025-09-03

## (undated) DEVICE — SYR 3ML LL 18GA 1.5IN

## (undated) DEVICE — SYR EPILOR LUER-LOK LOR 7ML

## (undated) DEVICE — DRAPE MEDIUM SHEET 40X70IN

## (undated) DEVICE — SET SMARTSITE EXT SMALLBORE NF

## (undated) DEVICE — CHLORAPREP 10.5 ML APPLICATOR

## (undated) DEVICE — DRAPE UTILITY W/ TAPE 20X30IN

## (undated) DEVICE — GLOVE SIGNATURE MICRO LTX 6.5

## (undated) DEVICE — CONTRAST ISOVUE M 200 20ML VIL

## (undated) DEVICE — ADAPTER DUAL NSL LUER M-M 7FT

## (undated) DEVICE — Device

## (undated) DEVICE — NDL HYPO REG 25G X 1 1/2

## (undated) DEVICE — NDL SYR 10ML 18X1.5 LL BLUNT

## (undated) DEVICE — NDL FLTR 5MCRN BLNT TIP 18GX1

## (undated) DEVICE — NDL EPIDURAL TOUHY 18G X3.5